# Patient Record
Sex: FEMALE | Race: WHITE | URBAN - METROPOLITAN AREA
[De-identification: names, ages, dates, MRNs, and addresses within clinical notes are randomized per-mention and may not be internally consistent; named-entity substitution may affect disease eponyms.]

---

## 2017-01-01 ENCOUNTER — APPOINTMENT (OUTPATIENT)
Dept: CT IMAGING | Facility: CLINIC | Age: 67
DRG: 057 | End: 2017-01-01
Attending: EMERGENCY MEDICINE
Payer: COMMERCIAL

## 2017-01-01 ENCOUNTER — APPOINTMENT (OUTPATIENT)
Dept: SPEECH THERAPY | Facility: CLINIC | Age: 67
DRG: 101 | End: 2017-01-01
Attending: INTERNAL MEDICINE
Payer: COMMERCIAL

## 2017-01-01 ENCOUNTER — NURSING HOME VISIT (OUTPATIENT)
Dept: GERIATRICS | Facility: CLINIC | Age: 67
End: 2017-01-01
Payer: COMMERCIAL

## 2017-01-01 ENCOUNTER — DOCUMENTATION ONLY (OUTPATIENT)
Dept: OTHER | Facility: CLINIC | Age: 67
End: 2017-01-01

## 2017-01-01 ENCOUNTER — NURSING HOME VISIT (OUTPATIENT)
Dept: FAMILY MEDICINE | Facility: CLINIC | Age: 67
End: 2017-01-01
Payer: COMMERCIAL

## 2017-01-01 ENCOUNTER — TELEPHONE (OUTPATIENT)
Dept: GERIATRICS | Facility: CLINIC | Age: 67
End: 2017-01-01

## 2017-01-01 ENCOUNTER — HOSPITAL ENCOUNTER (INPATIENT)
Facility: CLINIC | Age: 67
LOS: 3 days | Discharge: NURSING, REHAB OR LONG-TERM CARE | DRG: 057 | End: 2017-11-06
Attending: EMERGENCY MEDICINE | Admitting: INTERNAL MEDICINE
Payer: COMMERCIAL

## 2017-01-01 ENCOUNTER — APPOINTMENT (OUTPATIENT)
Dept: CT IMAGING | Facility: CLINIC | Age: 67
End: 2017-01-01
Attending: EMERGENCY MEDICINE
Payer: COMMERCIAL

## 2017-01-01 ENCOUNTER — HOSPITAL ENCOUNTER (INPATIENT)
Facility: CLINIC | Age: 67
LOS: 4 days | Discharge: NURSING, REHAB OR LONG-TERM CARE | DRG: 101 | End: 2017-10-03
Attending: EMERGENCY MEDICINE | Admitting: HOSPITALIST
Payer: COMMERCIAL

## 2017-01-01 ENCOUNTER — APPOINTMENT (OUTPATIENT)
Dept: CT IMAGING | Facility: CLINIC | Age: 67
DRG: 101 | End: 2017-01-01
Attending: EMERGENCY MEDICINE
Payer: COMMERCIAL

## 2017-01-01 ENCOUNTER — APPOINTMENT (OUTPATIENT)
Dept: CARDIOLOGY | Facility: CLINIC | Age: 67
DRG: 057 | End: 2017-01-01
Attending: PSYCHIATRY & NEUROLOGY
Payer: COMMERCIAL

## 2017-01-01 ENCOUNTER — CLINICAL UPDATE (OUTPATIENT)
Dept: PHARMACY | Facility: CLINIC | Age: 67
End: 2017-01-01

## 2017-01-01 ENCOUNTER — HOSPITAL ENCOUNTER (EMERGENCY)
Facility: CLINIC | Age: 67
Discharge: NURSING, REHAB OR LONG-TERM CARE | End: 2017-10-04
Attending: EMERGENCY MEDICINE | Admitting: EMERGENCY MEDICINE
Payer: COMMERCIAL

## 2017-01-01 ENCOUNTER — APPOINTMENT (OUTPATIENT)
Dept: MRI IMAGING | Facility: CLINIC | Age: 67
DRG: 057 | End: 2017-01-01
Attending: INTERNAL MEDICINE
Payer: COMMERCIAL

## 2017-01-01 ENCOUNTER — APPOINTMENT (OUTPATIENT)
Dept: GENERAL RADIOLOGY | Facility: CLINIC | Age: 67
DRG: 101 | End: 2017-01-01
Attending: EMERGENCY MEDICINE
Payer: COMMERCIAL

## 2017-01-01 ENCOUNTER — APPOINTMENT (OUTPATIENT)
Dept: SPEECH THERAPY | Facility: CLINIC | Age: 67
DRG: 057 | End: 2017-01-01
Attending: INTERNAL MEDICINE
Payer: COMMERCIAL

## 2017-01-01 ENCOUNTER — HOSPITAL LABORATORY (OUTPATIENT)
Dept: OTHER | Facility: CLINIC | Age: 67
End: 2017-01-01

## 2017-01-01 VITALS
SYSTOLIC BLOOD PRESSURE: 130 MMHG | TEMPERATURE: 97.2 F | WEIGHT: 248.6 LBS | HEART RATE: 64 BPM | RESPIRATION RATE: 18 BRPM | DIASTOLIC BLOOD PRESSURE: 68 MMHG | HEIGHT: 66 IN | BODY MASS INDEX: 39.95 KG/M2 | OXYGEN SATURATION: 95 %

## 2017-01-01 VITALS
DIASTOLIC BLOOD PRESSURE: 73 MMHG | SYSTOLIC BLOOD PRESSURE: 120 MMHG | HEART RATE: 47 BPM | OXYGEN SATURATION: 96 % | RESPIRATION RATE: 16 BRPM | TEMPERATURE: 98.5 F

## 2017-01-01 VITALS
HEART RATE: 62 BPM | BODY MASS INDEX: 39.89 KG/M2 | DIASTOLIC BLOOD PRESSURE: 68 MMHG | WEIGHT: 248.2 LBS | OXYGEN SATURATION: 95 % | RESPIRATION RATE: 16 BRPM | TEMPERATURE: 97.8 F | SYSTOLIC BLOOD PRESSURE: 114 MMHG | HEIGHT: 66 IN

## 2017-01-01 VITALS
DIASTOLIC BLOOD PRESSURE: 63 MMHG | HEART RATE: 64 BPM | HEIGHT: 69 IN | OXYGEN SATURATION: 93 % | RESPIRATION RATE: 14 BRPM | BODY MASS INDEX: 38.53 KG/M2 | SYSTOLIC BLOOD PRESSURE: 117 MMHG | TEMPERATURE: 97.8 F | WEIGHT: 260.14 LBS

## 2017-01-01 VITALS
TEMPERATURE: 98.4 F | SYSTOLIC BLOOD PRESSURE: 100 MMHG | WEIGHT: 251 LBS | HEART RATE: 81 BPM | OXYGEN SATURATION: 95 % | RESPIRATION RATE: 16 BRPM | DIASTOLIC BLOOD PRESSURE: 58 MMHG | BODY MASS INDEX: 40.51 KG/M2

## 2017-01-01 VITALS
SYSTOLIC BLOOD PRESSURE: 116 MMHG | HEART RATE: 54 BPM | WEIGHT: 236.4 LBS | RESPIRATION RATE: 16 BRPM | BODY MASS INDEX: 37.99 KG/M2 | TEMPERATURE: 98.2 F | HEIGHT: 66 IN | OXYGEN SATURATION: 90 % | DIASTOLIC BLOOD PRESSURE: 74 MMHG

## 2017-01-01 VITALS
TEMPERATURE: 97.4 F | WEIGHT: 220 LBS | BODY MASS INDEX: 37.56 KG/M2 | HEART RATE: 64 BPM | SYSTOLIC BLOOD PRESSURE: 124 MMHG | DIASTOLIC BLOOD PRESSURE: 76 MMHG | HEIGHT: 64 IN | RESPIRATION RATE: 18 BRPM | OXYGEN SATURATION: 98 %

## 2017-01-01 VITALS
RESPIRATION RATE: 16 BRPM | SYSTOLIC BLOOD PRESSURE: 121 MMHG | HEART RATE: 49 BPM | OXYGEN SATURATION: 95 % | BODY MASS INDEX: 40.64 KG/M2 | TEMPERATURE: 97.6 F | HEIGHT: 66 IN | DIASTOLIC BLOOD PRESSURE: 72 MMHG | WEIGHT: 252.9 LBS

## 2017-01-01 VITALS
OXYGEN SATURATION: 100 % | RESPIRATION RATE: 16 BRPM | WEIGHT: 252.3 LBS | HEART RATE: 55 BPM | TEMPERATURE: 98 F | HEIGHT: 66 IN | DIASTOLIC BLOOD PRESSURE: 71 MMHG | BODY MASS INDEX: 40.55 KG/M2 | SYSTOLIC BLOOD PRESSURE: 105 MMHG

## 2017-01-01 VITALS
BODY MASS INDEX: 39.89 KG/M2 | TEMPERATURE: 98 F | HEART RATE: 54 BPM | RESPIRATION RATE: 19 BRPM | SYSTOLIC BLOOD PRESSURE: 124 MMHG | WEIGHT: 248.2 LBS | OXYGEN SATURATION: 96 % | HEIGHT: 66 IN | DIASTOLIC BLOOD PRESSURE: 80 MMHG

## 2017-01-01 VITALS
TEMPERATURE: 98.4 F | RESPIRATION RATE: 16 BRPM | BODY MASS INDEX: 35.52 KG/M2 | HEART RATE: 57 BPM | OXYGEN SATURATION: 92 % | HEIGHT: 66 IN | WEIGHT: 221 LBS | SYSTOLIC BLOOD PRESSURE: 122 MMHG | DIASTOLIC BLOOD PRESSURE: 76 MMHG

## 2017-01-01 VITALS
HEART RATE: 54 BPM | RESPIRATION RATE: 16 BRPM | BODY MASS INDEX: 37.99 KG/M2 | DIASTOLIC BLOOD PRESSURE: 74 MMHG | OXYGEN SATURATION: 90 % | WEIGHT: 236.4 LBS | SYSTOLIC BLOOD PRESSURE: 116 MMHG | HEIGHT: 66 IN | TEMPERATURE: 98.2 F

## 2017-01-01 VITALS
RESPIRATION RATE: 18 BRPM | DIASTOLIC BLOOD PRESSURE: 78 MMHG | HEIGHT: 66 IN | WEIGHT: 249 LBS | TEMPERATURE: 97.6 F | HEART RATE: 82 BPM | SYSTOLIC BLOOD PRESSURE: 125 MMHG | BODY MASS INDEX: 40.02 KG/M2 | OXYGEN SATURATION: 95 %

## 2017-01-01 VITALS
HEART RATE: 55 BPM | DIASTOLIC BLOOD PRESSURE: 60 MMHG | HEIGHT: 66 IN | RESPIRATION RATE: 16 BRPM | OXYGEN SATURATION: 97 % | WEIGHT: 251.7 LBS | BODY MASS INDEX: 40.45 KG/M2 | TEMPERATURE: 97.6 F | SYSTOLIC BLOOD PRESSURE: 99 MMHG

## 2017-01-01 VITALS
HEART RATE: 60 BPM | DIASTOLIC BLOOD PRESSURE: 80 MMHG | TEMPERATURE: 97 F | SYSTOLIC BLOOD PRESSURE: 122 MMHG | RESPIRATION RATE: 16 BRPM | HEIGHT: 66 IN | BODY MASS INDEX: 38.47 KG/M2 | WEIGHT: 239.4 LBS | OXYGEN SATURATION: 93 %

## 2017-01-01 VITALS
HEIGHT: 66 IN | DIASTOLIC BLOOD PRESSURE: 80 MMHG | SYSTOLIC BLOOD PRESSURE: 124 MMHG | TEMPERATURE: 98 F | HEART RATE: 54 BPM | BODY MASS INDEX: 39.89 KG/M2 | WEIGHT: 248.2 LBS | OXYGEN SATURATION: 96 % | RESPIRATION RATE: 19 BRPM

## 2017-01-01 VITALS
OXYGEN SATURATION: 96 % | DIASTOLIC BLOOD PRESSURE: 80 MMHG | HEART RATE: 60 BPM | TEMPERATURE: 96.8 F | BODY MASS INDEX: 40.37 KG/M2 | HEIGHT: 66 IN | SYSTOLIC BLOOD PRESSURE: 124 MMHG | RESPIRATION RATE: 16 BRPM | WEIGHT: 251.2 LBS

## 2017-01-01 VITALS
WEIGHT: 239.8 LBS | SYSTOLIC BLOOD PRESSURE: 120 MMHG | TEMPERATURE: 97 F | DIASTOLIC BLOOD PRESSURE: 76 MMHG | OXYGEN SATURATION: 94 % | HEIGHT: 66 IN | HEART RATE: 64 BPM | RESPIRATION RATE: 16 BRPM | BODY MASS INDEX: 38.54 KG/M2

## 2017-01-01 VITALS
SYSTOLIC BLOOD PRESSURE: 116 MMHG | RESPIRATION RATE: 16 BRPM | BODY MASS INDEX: 37.99 KG/M2 | OXYGEN SATURATION: 90 % | DIASTOLIC BLOOD PRESSURE: 74 MMHG | WEIGHT: 236.4 LBS | HEIGHT: 66 IN | TEMPERATURE: 98.2 F | HEART RATE: 54 BPM

## 2017-01-01 VITALS
RESPIRATION RATE: 16 BRPM | DIASTOLIC BLOOD PRESSURE: 72 MMHG | HEIGHT: 66 IN | OXYGEN SATURATION: 92 % | SYSTOLIC BLOOD PRESSURE: 124 MMHG | BODY MASS INDEX: 40.27 KG/M2 | HEART RATE: 56 BPM | TEMPERATURE: 98.4 F | WEIGHT: 250.6 LBS

## 2017-01-01 DIAGNOSIS — E66.01 MORBID OBESITY, UNSPECIFIED OBESITY TYPE (H): ICD-10-CM

## 2017-01-01 DIAGNOSIS — B35.1 ONYCHOMYCOSIS: ICD-10-CM

## 2017-01-01 DIAGNOSIS — G30.0 DEMENTIA IN ALZHEIMER'S DISEASE WITH EARLY ONSET (H): ICD-10-CM

## 2017-01-01 DIAGNOSIS — I10 HYPERTENSION GOAL BP (BLOOD PRESSURE) < 130/80: ICD-10-CM

## 2017-01-01 DIAGNOSIS — M62.81 GENERALIZED MUSCLE WEAKNESS: Primary | ICD-10-CM

## 2017-01-01 DIAGNOSIS — G30.0 EARLY ONSET ALZHEIMER'S DISEASE WITH BEHAVIORAL DISTURBANCE (H): Primary | ICD-10-CM

## 2017-01-01 DIAGNOSIS — G40.909 SEIZURE DISORDER (H): ICD-10-CM

## 2017-01-01 DIAGNOSIS — I69.990 APRAXIA, LATE EFFECT OF CARDIOVASCULAR DISEASE: ICD-10-CM

## 2017-01-01 DIAGNOSIS — F02.818 EARLY ONSET ALZHEIMER'S DISEASE WITH BEHAVIORAL DISTURBANCE (H): ICD-10-CM

## 2017-01-01 DIAGNOSIS — R00.1 BRADYCARDIA: ICD-10-CM

## 2017-01-01 DIAGNOSIS — R56.9 SEIZURE (H): ICD-10-CM

## 2017-01-01 DIAGNOSIS — R47.89 WORD FINDING DIFFICULTY: ICD-10-CM

## 2017-01-01 DIAGNOSIS — F33.1 MODERATE EPISODE OF RECURRENT MAJOR DEPRESSIVE DISORDER (H): ICD-10-CM

## 2017-01-01 DIAGNOSIS — G30.0 EARLY ONSET ALZHEIMER'S DISEASE WITH BEHAVIORAL DISTURBANCE (H): ICD-10-CM

## 2017-01-01 DIAGNOSIS — F02.818 EARLY ONSET ALZHEIMER'S DISEASE WITH BEHAVIORAL DISTURBANCE (H): Primary | ICD-10-CM

## 2017-01-01 DIAGNOSIS — H61.23 BILATERAL IMPACTED CERUMEN: ICD-10-CM

## 2017-01-01 DIAGNOSIS — Z71.89 ADVANCED DIRECTIVES, COUNSELING/DISCUSSION: Chronic | ICD-10-CM

## 2017-01-01 DIAGNOSIS — E55.9 VITAMIN D DEFICIENCY: ICD-10-CM

## 2017-01-01 DIAGNOSIS — N39.0 URINARY TRACT INFECTION WITHOUT HEMATURIA, SITE UNSPECIFIED: ICD-10-CM

## 2017-01-01 DIAGNOSIS — Z98.84 BARIATRIC SURGERY STATUS: ICD-10-CM

## 2017-01-01 DIAGNOSIS — R53.1 WEAKNESS OF LEFT SIDE OF BODY: ICD-10-CM

## 2017-01-01 DIAGNOSIS — R13.10 DYSPHAGIA, UNSPECIFIED TYPE: ICD-10-CM

## 2017-01-01 DIAGNOSIS — E55.9 VITAMIN D INSUFFICIENCY: ICD-10-CM

## 2017-01-01 DIAGNOSIS — Z86.39 HISTORY OF DIABETES MELLITUS, TYPE II: ICD-10-CM

## 2017-01-01 DIAGNOSIS — E66.09 OBESITY DUE TO EXCESS CALORIES, UNSPECIFIED OBESITY SEVERITY: ICD-10-CM

## 2017-01-01 DIAGNOSIS — I10 HYPERTENSION GOAL BP (BLOOD PRESSURE) < 130/80: Primary | ICD-10-CM

## 2017-01-01 DIAGNOSIS — R56.9 SEIZURE (H): Primary | ICD-10-CM

## 2017-01-01 DIAGNOSIS — Z71.89 ADVANCED DIRECTIVES, COUNSELING/DISCUSSION: ICD-10-CM

## 2017-01-01 DIAGNOSIS — R47.01: ICD-10-CM

## 2017-01-01 DIAGNOSIS — Z80.41 FAMILY HISTORY OF MALIGNANT NEOPLASM OF OVARY: ICD-10-CM

## 2017-01-01 DIAGNOSIS — R48.2 APRAXIA: ICD-10-CM

## 2017-01-01 DIAGNOSIS — G40.409 GRAND MAL SEIZURE (H): ICD-10-CM

## 2017-01-01 DIAGNOSIS — R63.4 WEIGHT LOSS: ICD-10-CM

## 2017-01-01 DIAGNOSIS — R41.82 ALTERED MENTAL STATUS, UNSPECIFIED ALTERED MENTAL STATUS TYPE: ICD-10-CM

## 2017-01-01 DIAGNOSIS — R13.10 DYSPHAGIA, UNSPECIFIED TYPE: Primary | ICD-10-CM

## 2017-01-01 DIAGNOSIS — R63.4 LOSS OF WEIGHT: Primary | ICD-10-CM

## 2017-01-01 DIAGNOSIS — R30.0 DYSURIA: Primary | ICD-10-CM

## 2017-01-01 DIAGNOSIS — F02.80 DEMENTIA IN ALZHEIMER'S DISEASE WITH EARLY ONSET (H): ICD-10-CM

## 2017-01-01 DIAGNOSIS — Z51.5 COMFORT MEASURES ONLY STATUS: ICD-10-CM

## 2017-01-01 DIAGNOSIS — I63.40 CEREBRAL INFARCTION DUE TO EMBOLISM OF CEREBRAL ARTERY (H): ICD-10-CM

## 2017-01-01 DIAGNOSIS — R47.01 APHASIA: ICD-10-CM

## 2017-01-01 LAB
ALBUMIN SERPL-MCNC: 2.5 G/DL (ref 3.4–5)
ALBUMIN SERPL-MCNC: 2.8 G/DL (ref 3.4–5)
ALBUMIN UR-MCNC: 100 MG/DL
ALBUMIN UR-MCNC: NEGATIVE MG/DL
ALP SERPL-CCNC: 69 U/L (ref 40–150)
ALP SERPL-CCNC: 86 U/L (ref 40–150)
ALT SERPL W P-5'-P-CCNC: 24 U/L (ref 0–50)
ALT SERPL W P-5'-P-CCNC: 48 U/L (ref 0–50)
ANION GAP SERPL CALCULATED.3IONS-SCNC: 4 MMOL/L (ref 3–14)
ANION GAP SERPL CALCULATED.3IONS-SCNC: 7 MMOL/L (ref 3–14)
ANION GAP SERPL CALCULATED.3IONS-SCNC: 9 MMOL/L (ref 3–14)
ANION GAP SERPL CALCULATED.3IONS-SCNC: 9 MMOL/L (ref 3–14)
ANION GAP SERPL CALCULATED.3IONS-SCNC: <1 MMOL/L (ref 3–14)
APPEARANCE UR: ABNORMAL
APPEARANCE UR: ABNORMAL
APTT PPP: 35 SEC (ref 22–37)
AST SERPL W P-5'-P-CCNC: 13 U/L (ref 0–45)
AST SERPL W P-5'-P-CCNC: 41 U/L (ref 0–45)
BACTERIA #/AREA URNS HPF: ABNORMAL /HPF
BACTERIA #/AREA URNS HPF: ABNORMAL /HPF
BACTERIA SPEC CULT: ABNORMAL
BASE DEFICIT BLDV-SCNC: 7.1 MMOL/L
BASOPHILS # BLD AUTO: 0 10E9/L (ref 0–0.2)
BASOPHILS # BLD AUTO: 0 10E9/L (ref 0–0.2)
BASOPHILS # BLD AUTO: 0.1 10E9/L (ref 0–0.2)
BASOPHILS NFR BLD AUTO: 0.3 %
BASOPHILS NFR BLD AUTO: 0.3 %
BASOPHILS NFR BLD AUTO: 0.6 %
BILIRUB SERPL-MCNC: 0.4 MG/DL (ref 0.2–1.3)
BILIRUB SERPL-MCNC: 0.8 MG/DL (ref 0.2–1.3)
BILIRUB UR QL STRIP: NEGATIVE
BILIRUB UR QL STRIP: NEGATIVE
BUN SERPL-MCNC: 14 MG/DL (ref 7–30)
BUN SERPL-MCNC: 15 MG/DL (ref 7–30)
BUN SERPL-MCNC: 18 MG/DL (ref 7–30)
BUN SERPL-MCNC: 19 MG/DL (ref 7–30)
BUN SERPL-MCNC: 27 MG/DL (ref 7–30)
CALCIUM SERPL-MCNC: 8 MG/DL (ref 8.5–10.1)
CALCIUM SERPL-MCNC: 8.4 MG/DL (ref 8.5–10.1)
CALCIUM SERPL-MCNC: 8.8 MG/DL (ref 8.5–10.1)
CALCIUM SERPL-MCNC: 9.2 MG/DL (ref 8.5–10.1)
CALCIUM SERPL-MCNC: 9.5 MG/DL (ref 8.5–10.1)
CANCER AG125 SERPL-ACNC: 14 U/ML (ref 0–30)
CHLORIDE SERPL-SCNC: 107 MMOL/L (ref 94–109)
CHLORIDE SERPL-SCNC: 108 MMOL/L (ref 94–109)
CHLORIDE SERPL-SCNC: 109 MMOL/L (ref 94–109)
CHLORIDE SERPL-SCNC: 109 MMOL/L (ref 94–109)
CHLORIDE SERPL-SCNC: 111 MMOL/L (ref 94–109)
CHOLEST SERPL-MCNC: 206 MG/DL
CK SERPL-CCNC: 123 U/L (ref 30–225)
CO2 BLDCOV-SCNC: 30 MMOL/L (ref 21–28)
CO2 SERPL-SCNC: 24 MMOL/L (ref 20–32)
CO2 SERPL-SCNC: 25 MMOL/L (ref 20–32)
CO2 SERPL-SCNC: 29 MMOL/L (ref 20–32)
CO2 SERPL-SCNC: 30 MMOL/L (ref 20–32)
CO2 SERPL-SCNC: 30 MMOL/L (ref 20–32)
COLOR UR AUTO: ABNORMAL
COLOR UR AUTO: YELLOW
CREAT BLD-MCNC: 0.9 MG/DL (ref 0.52–1.04)
CREAT SERPL-MCNC: 0.72 MG/DL (ref 0.52–1.04)
CREAT SERPL-MCNC: 0.78 MG/DL (ref 0.52–1.04)
CREAT SERPL-MCNC: 0.8 MG/DL (ref 0.52–1.04)
CREAT SERPL-MCNC: 0.85 MG/DL (ref 0.52–1.04)
CREAT SERPL-MCNC: 0.9 MG/DL (ref 0.52–1.04)
CRP SERPL-MCNC: 3.4 MG/L (ref 0–8)
DEPRECATED CALCIDIOL+CALCIFEROL SERPL-MC: 24 UG/L (ref 20–75)
DEPRECATED CALCIDIOL+CALCIFEROL SERPL-MC: 32 UG/L (ref 20–75)
DIFFERENTIAL METHOD BLD: NORMAL
EOSINOPHIL # BLD AUTO: 0.4 10E9/L (ref 0–0.7)
EOSINOPHIL # BLD AUTO: 0.4 10E9/L (ref 0–0.7)
EOSINOPHIL # BLD AUTO: 0.5 10E9/L (ref 0–0.7)
EOSINOPHIL NFR BLD AUTO: 3.5 %
EOSINOPHIL NFR BLD AUTO: 4.9 %
EOSINOPHIL NFR BLD AUTO: 5.5 %
ERYTHROCYTE [DISTWIDTH] IN BLOOD BY AUTOMATED COUNT: 13.4 % (ref 10–15)
ERYTHROCYTE [DISTWIDTH] IN BLOOD BY AUTOMATED COUNT: 13.6 % (ref 10–15)
ERYTHROCYTE [DISTWIDTH] IN BLOOD BY AUTOMATED COUNT: 13.6 % (ref 10–15)
ERYTHROCYTE [DISTWIDTH] IN BLOOD BY AUTOMATED COUNT: 13.9 % (ref 10–15)
GFR SERPL CREATININE-BSD FRML MDRD: 62 ML/MIN/1.7M2
GFR SERPL CREATININE-BSD FRML MDRD: 62 ML/MIN/1.7M2
GFR SERPL CREATININE-BSD FRML MDRD: 66 ML/MIN/1.7M2
GFR SERPL CREATININE-BSD FRML MDRD: 71 ML/MIN/1.7M2
GFR SERPL CREATININE-BSD FRML MDRD: 73 ML/MIN/1.7M2
GFR SERPL CREATININE-BSD FRML MDRD: 81 ML/MIN/1.7M2
GLUCOSE BLDC GLUCOMTR-MCNC: 100 MG/DL (ref 70–99)
GLUCOSE BLDC GLUCOMTR-MCNC: 102 MG/DL (ref 70–99)
GLUCOSE BLDC GLUCOMTR-MCNC: 114 MG/DL (ref 70–99)
GLUCOSE BLDC GLUCOMTR-MCNC: 116 MG/DL (ref 70–99)
GLUCOSE BLDC GLUCOMTR-MCNC: 119 MG/DL (ref 70–99)
GLUCOSE BLDC GLUCOMTR-MCNC: 130 MG/DL (ref 70–99)
GLUCOSE BLDC GLUCOMTR-MCNC: 146 MG/DL (ref 70–99)
GLUCOSE BLDC GLUCOMTR-MCNC: 155 MG/DL (ref 70–99)
GLUCOSE BLDC GLUCOMTR-MCNC: 61 MG/DL (ref 70–99)
GLUCOSE BLDC GLUCOMTR-MCNC: 71 MG/DL (ref 70–99)
GLUCOSE BLDC GLUCOMTR-MCNC: 74 MG/DL (ref 70–99)
GLUCOSE BLDC GLUCOMTR-MCNC: 75 MG/DL (ref 70–99)
GLUCOSE BLDC GLUCOMTR-MCNC: 77 MG/DL (ref 70–99)
GLUCOSE BLDC GLUCOMTR-MCNC: 78 MG/DL (ref 70–99)
GLUCOSE BLDC GLUCOMTR-MCNC: 78 MG/DL (ref 70–99)
GLUCOSE BLDC GLUCOMTR-MCNC: 79 MG/DL (ref 70–99)
GLUCOSE BLDC GLUCOMTR-MCNC: 79 MG/DL (ref 70–99)
GLUCOSE BLDC GLUCOMTR-MCNC: 80 MG/DL (ref 70–99)
GLUCOSE BLDC GLUCOMTR-MCNC: 81 MG/DL (ref 70–99)
GLUCOSE BLDC GLUCOMTR-MCNC: 81 MG/DL (ref 70–99)
GLUCOSE BLDC GLUCOMTR-MCNC: 82 MG/DL (ref 70–99)
GLUCOSE BLDC GLUCOMTR-MCNC: 82 MG/DL (ref 70–99)
GLUCOSE BLDC GLUCOMTR-MCNC: 88 MG/DL (ref 70–99)
GLUCOSE BLDC GLUCOMTR-MCNC: 88 MG/DL (ref 70–99)
GLUCOSE BLDC GLUCOMTR-MCNC: 92 MG/DL (ref 70–99)
GLUCOSE BLDC GLUCOMTR-MCNC: 93 MG/DL (ref 70–99)
GLUCOSE BLDC GLUCOMTR-MCNC: 98 MG/DL (ref 70–99)
GLUCOSE SERPL-MCNC: 109 MG/DL (ref 70–99)
GLUCOSE SERPL-MCNC: 123 MG/DL (ref 70–99)
GLUCOSE SERPL-MCNC: 77 MG/DL (ref 70–99)
GLUCOSE SERPL-MCNC: 82 MG/DL (ref 70–99)
GLUCOSE SERPL-MCNC: 88 MG/DL (ref 70–99)
GLUCOSE UR STRIP-MCNC: NEGATIVE MG/DL
GLUCOSE UR STRIP-MCNC: NEGATIVE MG/DL
HBA1C MFR BLD: 5.5 % (ref 4.3–6)
HBA1C MFR BLD: 5.6 % (ref 4.3–6)
HCO3 BLDV-SCNC: 22 MMOL/L (ref 21–28)
HCT VFR BLD AUTO: 40.7 % (ref 35–47)
HCT VFR BLD AUTO: 42.7 % (ref 35–47)
HCT VFR BLD AUTO: 45.6 % (ref 35–47)
HCT VFR BLD AUTO: 46.1 % (ref 35–47)
HDLC SERPL-MCNC: 49 MG/DL
HGB BLD-MCNC: 13 G/DL (ref 11.7–15.7)
HGB BLD-MCNC: 14 G/DL (ref 11.7–15.7)
HGB BLD-MCNC: 14.5 G/DL (ref 11.7–15.7)
HGB BLD-MCNC: 15.2 G/DL (ref 11.7–15.7)
HGB UR QL STRIP: NEGATIVE
HGB UR QL STRIP: NEGATIVE
IMM GRANULOCYTES # BLD: 0 10E9/L (ref 0–0.4)
IMM GRANULOCYTES NFR BLD: 0.2 %
IMM GRANULOCYTES NFR BLD: 0.3 %
IMM GRANULOCYTES NFR BLD: 0.4 %
INR PPP: 0.99 (ref 0.86–1.14)
INTERPRETATION ECG - MUSE: NORMAL
INTERPRETATION ECG - MUSE: NORMAL
KETONES UR STRIP-MCNC: NEGATIVE MG/DL
KETONES UR STRIP-MCNC: NEGATIVE MG/DL
LACTATE BLD-SCNC: 1.5 MMOL/L (ref 0.7–2)
LACTATE BLD-SCNC: 2 MMOL/L (ref 0.7–2.1)
LDLC SERPL CALC-MCNC: 140 MG/DL
LEUKOCYTE ESTERASE UR QL STRIP: ABNORMAL
LEUKOCYTE ESTERASE UR QL STRIP: NEGATIVE
LEVETIRACETAM SERPL-MCNC: 20 UG/ML (ref 12–46)
LEVETIRACETAM SERPL-MCNC: 25 UG/ML (ref 12–46)
LYMPHOCYTES # BLD AUTO: 3.1 10E9/L (ref 0.8–5.3)
LYMPHOCYTES # BLD AUTO: 4.7 10E9/L (ref 0.8–5.3)
LYMPHOCYTES # BLD AUTO: 5.2 10E9/L (ref 0.8–5.3)
LYMPHOCYTES NFR BLD AUTO: 40.1 %
LYMPHOCYTES NFR BLD AUTO: 48.7 %
LYMPHOCYTES NFR BLD AUTO: 53 %
Lab: ABNORMAL
MAGNESIUM SERPL-MCNC: 2.3 MG/DL (ref 1.6–2.3)
MCH RBC QN AUTO: 31.5 PG (ref 26.5–33)
MCH RBC QN AUTO: 31.6 PG (ref 26.5–33)
MCH RBC QN AUTO: 32.2 PG (ref 26.5–33)
MCH RBC QN AUTO: 32.3 PG (ref 26.5–33)
MCHC RBC AUTO-ENTMCNC: 31.8 G/DL (ref 31.5–36.5)
MCHC RBC AUTO-ENTMCNC: 31.9 G/DL (ref 31.5–36.5)
MCHC RBC AUTO-ENTMCNC: 32.8 G/DL (ref 31.5–36.5)
MCHC RBC AUTO-ENTMCNC: 33 G/DL (ref 31.5–36.5)
MCV RBC AUTO: 98 FL (ref 78–100)
MCV RBC AUTO: 98 FL (ref 78–100)
MCV RBC AUTO: 99 FL (ref 78–100)
MCV RBC AUTO: 99 FL (ref 78–100)
MONOCYTES # BLD AUTO: 0.6 10E9/L (ref 0–1.3)
MONOCYTES # BLD AUTO: 0.7 10E9/L (ref 0–1.3)
MONOCYTES # BLD AUTO: 0.7 10E9/L (ref 0–1.3)
MONOCYTES NFR BLD AUTO: 6.6 %
MONOCYTES NFR BLD AUTO: 7 %
MONOCYTES NFR BLD AUTO: 9.3 %
MUCOUS THREADS #/AREA URNS LPF: PRESENT /LPF
MUCOUS THREADS #/AREA URNS LPF: PRESENT /LPF
NEUTROPHILS # BLD AUTO: 3 10E9/L (ref 1.6–8.3)
NEUTROPHILS # BLD AUTO: 3.5 10E9/L (ref 1.6–8.3)
NEUTROPHILS # BLD AUTO: 4.3 10E9/L (ref 1.6–8.3)
NEUTROPHILS NFR BLD AUTO: 33.7 %
NEUTROPHILS NFR BLD AUTO: 40.5 %
NEUTROPHILS NFR BLD AUTO: 45.1 %
NITRATE UR QL: NEGATIVE
NITRATE UR QL: NEGATIVE
NONHDLC SERPL-MCNC: 157 MG/DL
NRBC # BLD AUTO: 0 10*3/UL
NRBC BLD AUTO-RTO: 0 /100
O2/TOTAL GAS SETTING VFR VENT: 2 %
PARANEOPLASTIC AB SER QL IF: NORMAL
PCO2 BLDV: 53 MM HG (ref 40–50)
PCO2 BLDV: 56 MM HG (ref 40–50)
PH BLDV: 7.2 PH (ref 7.32–7.43)
PH BLDV: 7.36 PH (ref 7.32–7.43)
PH UR STRIP: 5 PH (ref 5–7)
PH UR STRIP: 6 PH (ref 5–7)
PLATELET # BLD AUTO: 151 10E9/L (ref 150–450)
PLATELET # BLD AUTO: 161 10E9/L (ref 150–450)
PLATELET # BLD AUTO: 170 10E9/L (ref 150–450)
PLATELET # BLD AUTO: 179 10E9/L (ref 150–450)
PLATELET # BLD EST: NORMAL 10*3/UL
PO2 BLDV: 32 MM HG (ref 25–47)
PO2 BLDV: 39 MM HG (ref 25–47)
POTASSIUM SERPL-SCNC: 3.8 MMOL/L (ref 3.4–5.3)
POTASSIUM SERPL-SCNC: 3.8 MMOL/L (ref 3.4–5.3)
POTASSIUM SERPL-SCNC: 4 MMOL/L (ref 3.4–5.3)
POTASSIUM SERPL-SCNC: 4.5 MMOL/L (ref 3.4–5.3)
POTASSIUM SERPL-SCNC: 5 MMOL/L (ref 3.4–5.3)
PROT SERPL-MCNC: 6 G/DL (ref 6.8–8.8)
PROT SERPL-MCNC: 6.2 G/DL (ref 6.8–8.8)
RBC # BLD AUTO: 4.12 10E12/L (ref 3.8–5.2)
RBC # BLD AUTO: 4.35 10E12/L (ref 3.8–5.2)
RBC # BLD AUTO: 4.6 10E12/L (ref 3.8–5.2)
RBC # BLD AUTO: 4.71 10E12/L (ref 3.8–5.2)
RBC #/AREA URNS AUTO: 11 /HPF (ref 0–2)
RBC #/AREA URNS AUTO: 2 /HPF (ref 0–2)
RBC MORPH BLD: NORMAL
SAO2 % BLDV FROM PO2: 57 %
SODIUM SERPL-SCNC: 141 MMOL/L (ref 133–144)
SODIUM SERPL-SCNC: 141 MMOL/L (ref 133–144)
SODIUM SERPL-SCNC: 142 MMOL/L (ref 133–144)
SODIUM SERPL-SCNC: 143 MMOL/L (ref 133–144)
SODIUM SERPL-SCNC: 144 MMOL/L (ref 133–144)
SOURCE: ABNORMAL
SOURCE: ABNORMAL
SP GR UR STRIP: 1.01 (ref 1–1.03)
SP GR UR STRIP: 1.02 (ref 1–1.03)
SPECIMEN SOURCE: ABNORMAL
SQUAMOUS #/AREA URNS AUTO: 1 /HPF (ref 0–1)
SQUAMOUS #/AREA URNS AUTO: <1 /HPF (ref 0–1)
TRIGL SERPL-MCNC: 83 MG/DL
TROPONIN I BLD-MCNC: 0 UG/L (ref 0–0.1)
TROPONIN I SERPL-MCNC: <0.015 UG/L (ref 0–0.04)
TROPONIN I SERPL-MCNC: <0.015 UG/L (ref 0–0.04)
TSH SERPL DL<=0.005 MIU/L-ACNC: 2.49 MU/L (ref 0.4–4)
TSH SERPL DL<=0.005 MIU/L-ACNC: 3.2 MU/L (ref 0.4–4)
TSH SERPL DL<=0.005 MIU/L-ACNC: 3.89 MU/L (ref 0.4–4)
UROBILINOGEN UR STRIP-MCNC: 4 MG/DL (ref 0–2)
UROBILINOGEN UR STRIP-MCNC: 4 MG/DL (ref 0–2)
VARIANT LYMPHS BLD QL SMEAR: PRESENT
VIT B12 SERPL-MCNC: 595 PG/ML (ref 193–986)
WBC # BLD AUTO: 10.7 10E9/L (ref 4–11)
WBC # BLD AUTO: 7.7 10E9/L (ref 4–11)
WBC # BLD AUTO: 7.8 10E9/L (ref 4–11)
WBC # BLD AUTO: 8.9 10E9/L (ref 4–11)
WBC #/AREA URNS AUTO: 2 /HPF (ref 0–2)
WBC #/AREA URNS AUTO: 31 /HPF (ref 0–2)

## 2017-01-01 PROCEDURE — 90662 IIV NO PRSV INCREASED AG IM: CPT | Performed by: INTERNAL MEDICINE

## 2017-01-01 PROCEDURE — 25000132 ZZH RX MED GY IP 250 OP 250 PS 637: Performed by: HOSPITALIST

## 2017-01-01 PROCEDURE — 99308 SBSQ NF CARE LOW MDM 20: CPT | Performed by: NURSE PRACTITIONER

## 2017-01-01 PROCEDURE — 82803 BLOOD GASES ANY COMBINATION: CPT | Performed by: EMERGENCY MEDICINE

## 2017-01-01 PROCEDURE — 99309 SBSQ NF CARE MODERATE MDM 30: CPT | Performed by: FAMILY MEDICINE

## 2017-01-01 PROCEDURE — 99285 EMERGENCY DEPT VISIT HI MDM: CPT | Mod: 25

## 2017-01-01 PROCEDURE — 25000132 ZZH RX MED GY IP 250 OP 250 PS 637: Performed by: EMERGENCY MEDICINE

## 2017-01-01 PROCEDURE — 25000132 ZZH RX MED GY IP 250 OP 250 PS 637: Performed by: INTERNAL MEDICINE

## 2017-01-01 PROCEDURE — 12000000 ZZH R&B MED SURG/OB

## 2017-01-01 PROCEDURE — 84484 ASSAY OF TROPONIN QUANT: CPT

## 2017-01-01 PROCEDURE — 99232 SBSQ HOSP IP/OBS MODERATE 35: CPT | Performed by: INTERNAL MEDICINE

## 2017-01-01 PROCEDURE — 99306 1ST NF CARE HIGH MDM 50: CPT | Performed by: FAMILY MEDICINE

## 2017-01-01 PROCEDURE — 70470 CT HEAD/BRAIN W/O & W/DYE: CPT | Mod: XS

## 2017-01-01 PROCEDURE — 93306 TTE W/DOPPLER COMPLETE: CPT | Mod: 26 | Performed by: INTERNAL MEDICINE

## 2017-01-01 PROCEDURE — 25000128 H RX IP 250 OP 636: Performed by: INTERNAL MEDICINE

## 2017-01-01 PROCEDURE — 25000132 ZZH RX MED GY IP 250 OP 250 PS 637: Performed by: PSYCHIATRY & NEUROLOGY

## 2017-01-01 PROCEDURE — 25000128 H RX IP 250 OP 636: Performed by: PSYCHIATRY & NEUROLOGY

## 2017-01-01 PROCEDURE — 99309 SBSQ NF CARE MODERATE MDM 30: CPT | Performed by: NURSE PRACTITIONER

## 2017-01-01 PROCEDURE — 25000128 H RX IP 250 OP 636: Performed by: EMERGENCY MEDICINE

## 2017-01-01 PROCEDURE — 99239 HOSP IP/OBS DSCHRG MGMT >30: CPT | Performed by: INTERNAL MEDICINE

## 2017-01-01 PROCEDURE — 99233 SBSQ HOSP IP/OBS HIGH 50: CPT | Performed by: INTERNAL MEDICINE

## 2017-01-01 PROCEDURE — 00000146 ZZHCL STATISTIC GLUCOSE BY METER IP

## 2017-01-01 PROCEDURE — 99212 OFFICE O/P EST SF 10 MIN: CPT

## 2017-01-01 PROCEDURE — 99207 ZZC APP CREDIT; MD BILLING SHARED VISIT: CPT | Performed by: INTERNAL MEDICINE

## 2017-01-01 PROCEDURE — G0378 HOSPITAL OBSERVATION PER HR: HCPCS

## 2017-01-01 PROCEDURE — 25000132 ZZH RX MED GY IP 250 OP 250 PS 637

## 2017-01-01 PROCEDURE — 99307 SBSQ NF CARE SF MDM 10: CPT | Performed by: NURSE PRACTITIONER

## 2017-01-01 PROCEDURE — 83735 ASSAY OF MAGNESIUM: CPT | Performed by: INTERNAL MEDICINE

## 2017-01-01 PROCEDURE — 70551 MRI BRAIN STEM W/O DYE: CPT

## 2017-01-01 PROCEDURE — 85025 COMPLETE CBC W/AUTO DIFF WBC: CPT | Performed by: EMERGENCY MEDICINE

## 2017-01-01 PROCEDURE — 85730 THROMBOPLASTIN TIME PARTIAL: CPT | Performed by: EMERGENCY MEDICINE

## 2017-01-01 PROCEDURE — 93005 ELECTROCARDIOGRAM TRACING: CPT

## 2017-01-01 PROCEDURE — 96361 HYDRATE IV INFUSION ADD-ON: CPT

## 2017-01-01 PROCEDURE — 86140 C-REACTIVE PROTEIN: CPT | Performed by: INTERNAL MEDICINE

## 2017-01-01 PROCEDURE — 99310 SBSQ NF CARE HIGH MDM 45: CPT | Performed by: NURSE PRACTITIONER

## 2017-01-01 PROCEDURE — 80177 DRUG SCRN QUAN LEVETIRACETAM: CPT | Performed by: EMERGENCY MEDICINE

## 2017-01-01 PROCEDURE — 40000225 ZZH STATISTIC SLP WARD VISIT

## 2017-01-01 PROCEDURE — 84484 ASSAY OF TROPONIN QUANT: CPT | Performed by: INTERNAL MEDICINE

## 2017-01-01 PROCEDURE — 36415 COLL VENOUS BLD VENIPUNCTURE: CPT | Performed by: INTERNAL MEDICINE

## 2017-01-01 PROCEDURE — 80053 COMPREHEN METABOLIC PANEL: CPT | Performed by: EMERGENCY MEDICINE

## 2017-01-01 PROCEDURE — 86255 FLUORESCENT ANTIBODY SCREEN: CPT | Performed by: EMERGENCY MEDICINE

## 2017-01-01 PROCEDURE — 81001 URINALYSIS AUTO W/SCOPE: CPT | Performed by: INTERNAL MEDICINE

## 2017-01-01 PROCEDURE — 99220 ZZC INITIAL OBSERVATION CARE,LEVL III: CPT | Performed by: HOSPITALIST

## 2017-01-01 PROCEDURE — 70450 CT HEAD/BRAIN W/O DYE: CPT

## 2017-01-01 PROCEDURE — 81001 URINALYSIS AUTO W/SCOPE: CPT | Performed by: EMERGENCY MEDICINE

## 2017-01-01 PROCEDURE — 99223 1ST HOSP IP/OBS HIGH 75: CPT | Mod: AI | Performed by: INTERNAL MEDICINE

## 2017-01-01 PROCEDURE — 87088 URINE BACTERIA CULTURE: CPT | Performed by: INTERNAL MEDICINE

## 2017-01-01 PROCEDURE — 99310 SBSQ NF CARE HIGH MDM 45: CPT | Mod: 25 | Performed by: NURSE PRACTITIONER

## 2017-01-01 PROCEDURE — 96360 HYDRATION IV INFUSION INIT: CPT | Mod: 59

## 2017-01-01 PROCEDURE — 83036 HEMOGLOBIN GLYCOSYLATED A1C: CPT | Performed by: INTERNAL MEDICINE

## 2017-01-01 PROCEDURE — 96365 THER/PROPH/DIAG IV INF INIT: CPT

## 2017-01-01 PROCEDURE — 25800025 ZZH RX 258: Performed by: INTERNAL MEDICINE

## 2017-01-01 PROCEDURE — 84443 ASSAY THYROID STIM HORMONE: CPT | Performed by: INTERNAL MEDICINE

## 2017-01-01 PROCEDURE — 99207 ZZC CDG-MDM COMPONENT: MEETS LOW - DOWN CODED: CPT | Performed by: INTERNAL MEDICINE

## 2017-01-01 PROCEDURE — 25000125 ZZHC RX 250: Performed by: EMERGENCY MEDICINE

## 2017-01-01 PROCEDURE — 82803 BLOOD GASES ANY COMBINATION: CPT

## 2017-01-01 PROCEDURE — 82550 ASSAY OF CK (CPK): CPT | Performed by: EMERGENCY MEDICINE

## 2017-01-01 PROCEDURE — 71020 XR CHEST 2 VW: CPT

## 2017-01-01 PROCEDURE — 92610 EVALUATE SWALLOWING FUNCTION: CPT | Mod: GN

## 2017-01-01 PROCEDURE — 83605 ASSAY OF LACTIC ACID: CPT | Performed by: EMERGENCY MEDICINE

## 2017-01-01 PROCEDURE — 87086 URINE CULTURE/COLONY COUNT: CPT | Performed by: INTERNAL MEDICINE

## 2017-01-01 PROCEDURE — 40000225 ZZH STATISTIC SLP WARD VISIT: Performed by: SPEECH-LANGUAGE PATHOLOGIST

## 2017-01-01 PROCEDURE — 92610 EVALUATE SWALLOWING FUNCTION: CPT | Mod: GN | Performed by: SPEECH-LANGUAGE PATHOLOGIST

## 2017-01-01 PROCEDURE — 82565 ASSAY OF CREATININE: CPT

## 2017-01-01 PROCEDURE — 83605 ASSAY OF LACTIC ACID: CPT

## 2017-01-01 PROCEDURE — 96375 TX/PRO/DX INJ NEW DRUG ADDON: CPT

## 2017-01-01 PROCEDURE — 69210 REMOVE IMPACTED EAR WAX UNI: CPT | Mod: LT | Performed by: NURSE PRACTITIONER

## 2017-01-01 PROCEDURE — 36415 COLL VENOUS BLD VENIPUNCTURE: CPT | Performed by: EMERGENCY MEDICINE

## 2017-01-01 PROCEDURE — 80048 BASIC METABOLIC PNL TOTAL CA: CPT | Performed by: EMERGENCY MEDICINE

## 2017-01-01 PROCEDURE — 93306 TTE W/DOPPLER COMPLETE: CPT

## 2017-01-01 PROCEDURE — 82306 VITAMIN D 25 HYDROXY: CPT | Performed by: INTERNAL MEDICINE

## 2017-01-01 PROCEDURE — 80053 COMPREHEN METABOLIC PANEL: CPT | Performed by: INTERNAL MEDICINE

## 2017-01-01 PROCEDURE — 40000893 ZZH STATISTIC PT IP EVAL DEFER: Performed by: PHYSICAL THERAPIST

## 2017-01-01 PROCEDURE — 87186 SC STD MICRODIL/AGAR DIL: CPT | Performed by: INTERNAL MEDICINE

## 2017-01-01 PROCEDURE — 85610 PROTHROMBIN TIME: CPT | Performed by: EMERGENCY MEDICINE

## 2017-01-01 PROCEDURE — 25000128 H RX IP 250 OP 636: Performed by: PHYSICIAN ASSISTANT

## 2017-01-01 PROCEDURE — 70460 CT HEAD/BRAIN W/DYE: CPT

## 2017-01-01 PROCEDURE — 70498 CT ANGIOGRAPHY NECK: CPT

## 2017-01-01 PROCEDURE — 40000264 ECHO COMPLETE BUBBLE

## 2017-01-01 PROCEDURE — 80061 LIPID PANEL: CPT | Performed by: INTERNAL MEDICINE

## 2017-01-01 RX ORDER — AMOXICILLIN 250 MG
2 CAPSULE ORAL 2 TIMES DAILY PRN
Status: DISCONTINUED | OUTPATIENT
Start: 2017-01-01 | End: 2017-01-01 | Stop reason: HOSPADM

## 2017-01-01 RX ORDER — DULOXETIN HYDROCHLORIDE 30 MG/1
30 CAPSULE, DELAYED RELEASE ORAL DAILY
Status: DISCONTINUED | OUTPATIENT
Start: 2017-01-01 | End: 2017-01-01 | Stop reason: HOSPADM

## 2017-01-01 RX ORDER — CIPROFLOXACIN 500 MG/1
500 TABLET, FILM COATED ORAL 2 TIMES DAILY
Qty: 10 TABLET | Refills: 0 | Status: SHIPPED | OUTPATIENT
Start: 2017-01-01 | End: 2017-01-01

## 2017-01-01 RX ORDER — ASPIRIN 300 MG/1
300 SUPPOSITORY RECTAL ONCE
Status: COMPLETED | OUTPATIENT
Start: 2017-01-01 | End: 2017-01-01

## 2017-01-01 RX ORDER — SODIUM CHLORIDE AND POTASSIUM CHLORIDE 150; 900 MG/100ML; MG/100ML
INJECTION, SOLUTION INTRAVENOUS CONTINUOUS
Status: DISPENSED | OUTPATIENT
Start: 2017-01-01 | End: 2017-01-01

## 2017-01-01 RX ORDER — NICOTINE POLACRILEX 4 MG
15-30 LOZENGE BUCCAL
Status: DISCONTINUED | OUTPATIENT
Start: 2017-01-01 | End: 2017-01-01 | Stop reason: HOSPADM

## 2017-01-01 RX ORDER — ASPIRIN 81 MG/1
81 TABLET, CHEWABLE ORAL DAILY
Status: DISCONTINUED | OUTPATIENT
Start: 2017-01-01 | End: 2017-01-01 | Stop reason: HOSPADM

## 2017-01-01 RX ORDER — ACETAMINOPHEN 325 MG/1
650 TABLET ORAL EVERY 4 HOURS PRN
Status: DISCONTINUED | OUTPATIENT
Start: 2017-01-01 | End: 2017-01-01 | Stop reason: HOSPADM

## 2017-01-01 RX ORDER — NYSTATIN 100000 [USP'U]/G
POWDER TOPICAL 2 TIMES DAILY
COMMUNITY
End: 2017-01-01

## 2017-01-01 RX ORDER — LIDOCAINE 40 MG/G
CREAM TOPICAL
Status: DISCONTINUED | OUTPATIENT
Start: 2017-01-01 | End: 2017-01-01 | Stop reason: HOSPADM

## 2017-01-01 RX ORDER — OLANZAPINE 2.5 MG/1
2.5 TABLET, FILM COATED ORAL 2 TIMES DAILY
Status: DISCONTINUED | OUTPATIENT
Start: 2017-01-01 | End: 2017-01-01 | Stop reason: HOSPADM

## 2017-01-01 RX ORDER — LEVETIRACETAM 1000 MG/1
1000 TABLET ORAL 2 TIMES DAILY
Qty: 60 TABLET | Refills: 0 | Status: SHIPPED | OUTPATIENT
Start: 2017-01-01 | End: 2017-01-01

## 2017-01-01 RX ORDER — CIPROFLOXACIN 250 MG/1
250 TABLET, FILM COATED ORAL EVERY 12 HOURS SCHEDULED
Status: DISCONTINUED | OUTPATIENT
Start: 2017-01-01 | End: 2017-01-01

## 2017-01-01 RX ORDER — GABAPENTIN 100 MG/1
100 CAPSULE ORAL 3 TIMES DAILY
Status: DISCONTINUED | OUTPATIENT
Start: 2017-01-01 | End: 2017-01-01 | Stop reason: HOSPADM

## 2017-01-01 RX ORDER — POTASSIUM CHLORIDE 7.45 MG/ML
10 INJECTION INTRAVENOUS
Status: DISCONTINUED | OUTPATIENT
Start: 2017-01-01 | End: 2017-01-01 | Stop reason: HOSPADM

## 2017-01-01 RX ORDER — SODIUM CHLORIDE 9 MG/ML
INJECTION, SOLUTION INTRAVENOUS CONTINUOUS
Status: DISCONTINUED | OUTPATIENT
Start: 2017-01-01 | End: 2017-01-01

## 2017-01-01 RX ORDER — ONDANSETRON 2 MG/ML
4 INJECTION INTRAMUSCULAR; INTRAVENOUS EVERY 6 HOURS PRN
Status: DISCONTINUED | OUTPATIENT
Start: 2017-01-01 | End: 2017-01-01 | Stop reason: HOSPADM

## 2017-01-01 RX ORDER — LABETALOL HYDROCHLORIDE 5 MG/ML
10-40 INJECTION, SOLUTION INTRAVENOUS EVERY 10 MIN PRN
Status: DISCONTINUED | OUTPATIENT
Start: 2017-01-01 | End: 2017-01-01 | Stop reason: HOSPADM

## 2017-01-01 RX ORDER — ACETAMINOPHEN 650 MG/1
650 SUPPOSITORY RECTAL EVERY 4 HOURS PRN
Status: DISCONTINUED | OUTPATIENT
Start: 2017-01-01 | End: 2017-01-01

## 2017-01-01 RX ORDER — ASPIRIN 81 MG/1
81 TABLET, CHEWABLE ORAL DAILY
COMMUNITY
End: 2017-01-01

## 2017-01-01 RX ORDER — ASPIRIN 300 MG/1
300 SUPPOSITORY RECTAL DAILY
Status: DISCONTINUED | OUTPATIENT
Start: 2017-01-01 | End: 2017-01-01

## 2017-01-01 RX ORDER — ACETAMINOPHEN 650 MG/1
650 SUPPOSITORY RECTAL EVERY 4 HOURS PRN
Status: DISCONTINUED | OUTPATIENT
Start: 2017-01-01 | End: 2017-01-01 | Stop reason: HOSPADM

## 2017-01-01 RX ORDER — LORAZEPAM 2 MG/ML
0.25 CONCENTRATE ORAL EVERY 4 HOURS PRN
Qty: 15 ML | COMMUNITY
Start: 2017-01-01

## 2017-01-01 RX ORDER — ACETAMINOPHEN 650 MG/1
650 SUPPOSITORY RECTAL EVERY 6 HOURS PRN
Qty: 60 SUPPOSITORY | COMMUNITY
Start: 2017-01-01

## 2017-01-01 RX ORDER — BISACODYL 10 MG
10 SUPPOSITORY, RECTAL RECTAL DAILY PRN
Status: DISCONTINUED | OUTPATIENT
Start: 2017-01-01 | End: 2017-01-01 | Stop reason: HOSPADM

## 2017-01-01 RX ORDER — MORPHINE SULFATE 100 MG/5ML
2.5 SOLUTION ORAL
Refills: 0 | COMMUNITY
Start: 2017-01-01

## 2017-01-01 RX ORDER — DONEPEZIL HYDROCHLORIDE 10 MG/1
10 TABLET, FILM COATED ORAL DAILY
Status: DISCONTINUED | OUTPATIENT
Start: 2017-01-01 | End: 2017-01-01 | Stop reason: HOSPADM

## 2017-01-01 RX ORDER — PROCHLORPERAZINE MALEATE 5 MG
5 TABLET ORAL EVERY 6 HOURS PRN
Status: DISCONTINUED | OUTPATIENT
Start: 2017-01-01 | End: 2017-01-01 | Stop reason: HOSPADM

## 2017-01-01 RX ORDER — POLYETHYLENE GLYCOL 3350 17 G/17G
17 POWDER, FOR SOLUTION ORAL DAILY PRN
Status: DISCONTINUED | OUTPATIENT
Start: 2017-01-01 | End: 2017-01-01 | Stop reason: HOSPADM

## 2017-01-01 RX ORDER — NALOXONE HYDROCHLORIDE 0.4 MG/ML
.1-.4 INJECTION, SOLUTION INTRAMUSCULAR; INTRAVENOUS; SUBCUTANEOUS
Status: DISCONTINUED | OUTPATIENT
Start: 2017-01-01 | End: 2017-01-01 | Stop reason: HOSPADM

## 2017-01-01 RX ORDER — OLANZAPINE 10 MG/2ML
2.5 INJECTION, POWDER, FOR SOLUTION INTRAMUSCULAR EVERY 6 HOURS PRN
Status: DISCONTINUED | OUTPATIENT
Start: 2017-01-01 | End: 2017-01-01 | Stop reason: HOSPADM

## 2017-01-01 RX ORDER — ONDANSETRON 2 MG/ML
4 INJECTION INTRAMUSCULAR; INTRAVENOUS EVERY 30 MIN PRN
Status: DISCONTINUED | OUTPATIENT
Start: 2017-01-01 | End: 2017-01-01

## 2017-01-01 RX ORDER — POTASSIUM CHLORIDE 1.5 G/1.58G
20-40 POWDER, FOR SOLUTION ORAL
Status: DISCONTINUED | OUTPATIENT
Start: 2017-01-01 | End: 2017-01-01 | Stop reason: HOSPADM

## 2017-01-01 RX ORDER — POTASSIUM CHLORIDE 1500 MG/1
20-40 TABLET, EXTENDED RELEASE ORAL
Status: DISCONTINUED | OUTPATIENT
Start: 2017-01-01 | End: 2017-01-01 | Stop reason: HOSPADM

## 2017-01-01 RX ORDER — IOPAMIDOL 755 MG/ML
120 INJECTION, SOLUTION INTRAVASCULAR ONCE
Status: COMPLETED | OUTPATIENT
Start: 2017-01-01 | End: 2017-01-01

## 2017-01-01 RX ORDER — LEVETIRACETAM 500 MG/1
500 TABLET ORAL 2 TIMES DAILY
Status: DISCONTINUED | OUTPATIENT
Start: 2017-01-01 | End: 2017-01-01

## 2017-01-01 RX ORDER — PROCHLORPERAZINE 25 MG
12.5 SUPPOSITORY, RECTAL RECTAL EVERY 12 HOURS PRN
Status: DISCONTINUED | OUTPATIENT
Start: 2017-01-01 | End: 2017-01-01 | Stop reason: HOSPADM

## 2017-01-01 RX ORDER — LEVETIRACETAM 500 MG/1
1000 TABLET ORAL 2 TIMES DAILY
Status: DISCONTINUED | OUTPATIENT
Start: 2017-01-01 | End: 2017-01-01 | Stop reason: HOSPADM

## 2017-01-01 RX ORDER — BISACODYL 10 MG
10 SUPPOSITORY, RECTAL RECTAL PRN
COMMUNITY

## 2017-01-01 RX ORDER — LORAZEPAM 2 MG/ML
2 INJECTION INTRAMUSCULAR
Status: DISCONTINUED | OUTPATIENT
Start: 2017-01-01 | End: 2017-01-01 | Stop reason: HOSPADM

## 2017-01-01 RX ORDER — POTASSIUM CL/LIDO/0.9 % NACL 10MEQ/0.1L
10 INTRAVENOUS SOLUTION, PIGGYBACK (ML) INTRAVENOUS
Status: DISCONTINUED | OUTPATIENT
Start: 2017-01-01 | End: 2017-01-01 | Stop reason: HOSPADM

## 2017-01-01 RX ORDER — HYDROMORPHONE HYDROCHLORIDE 1 MG/ML
0.2 INJECTION, SOLUTION INTRAMUSCULAR; INTRAVENOUS; SUBCUTANEOUS
Status: DISCONTINUED | OUTPATIENT
Start: 2017-01-01 | End: 2017-01-01 | Stop reason: HOSPADM

## 2017-01-01 RX ORDER — GABAPENTIN 250 MG/5ML
50 SOLUTION ORAL
Status: ON HOLD | COMMUNITY
End: 2017-01-01

## 2017-01-01 RX ORDER — ONDANSETRON 4 MG/1
4 TABLET, ORALLY DISINTEGRATING ORAL EVERY 6 HOURS PRN
Status: DISCONTINUED | OUTPATIENT
Start: 2017-01-01 | End: 2017-01-01 | Stop reason: HOSPADM

## 2017-01-01 RX ORDER — LEVETIRACETAM 100 MG/ML
1000 SOLUTION ORAL 2 TIMES DAILY
Status: DISCONTINUED | OUTPATIENT
Start: 2017-01-01 | End: 2017-01-01 | Stop reason: HOSPADM

## 2017-01-01 RX ORDER — HYDRALAZINE HYDROCHLORIDE 20 MG/ML
10-20 INJECTION INTRAMUSCULAR; INTRAVENOUS
Status: DISCONTINUED | OUTPATIENT
Start: 2017-01-01 | End: 2017-01-01 | Stop reason: HOSPADM

## 2017-01-01 RX ORDER — DIAZEPAM 5 MG
5 TABLET ORAL
Status: CANCELLED | OUTPATIENT
Start: 2017-01-01

## 2017-01-01 RX ORDER — DEXTROSE MONOHYDRATE 25 G/50ML
25-50 INJECTION, SOLUTION INTRAVENOUS
Status: DISCONTINUED | OUTPATIENT
Start: 2017-01-01 | End: 2017-01-01 | Stop reason: HOSPADM

## 2017-01-01 RX ORDER — MAGNESIUM SULFATE HEPTAHYDRATE 40 MG/ML
4 INJECTION, SOLUTION INTRAVENOUS EVERY 4 HOURS PRN
Status: DISCONTINUED | OUTPATIENT
Start: 2017-01-01 | End: 2017-01-01 | Stop reason: HOSPADM

## 2017-01-01 RX ORDER — POTASSIUM CHLORIDE 29.8 MG/ML
20 INJECTION INTRAVENOUS
Status: DISCONTINUED | OUTPATIENT
Start: 2017-01-01 | End: 2017-01-01 | Stop reason: CLARIF

## 2017-01-01 RX ORDER — NYSTATIN 100000 [USP'U]/G
POWDER TOPICAL 2 TIMES DAILY
COMMUNITY

## 2017-01-01 RX ORDER — CEFTRIAXONE 1 G/1
1 INJECTION, POWDER, FOR SOLUTION INTRAMUSCULAR; INTRAVENOUS EVERY 24 HOURS
Status: DISCONTINUED | OUTPATIENT
Start: 2017-01-01 | End: 2017-01-01 | Stop reason: HOSPADM

## 2017-01-01 RX ORDER — AMOXICILLIN 250 MG
1 CAPSULE ORAL 2 TIMES DAILY PRN
Status: DISCONTINUED | OUTPATIENT
Start: 2017-01-01 | End: 2017-01-01 | Stop reason: HOSPADM

## 2017-01-01 RX ORDER — SODIUM CHLORIDE AND POTASSIUM CHLORIDE 150; 450 MG/100ML; MG/100ML
INJECTION, SOLUTION INTRAVENOUS CONTINUOUS
Status: DISCONTINUED | OUTPATIENT
Start: 2017-01-01 | End: 2017-01-01 | Stop reason: HOSPADM

## 2017-01-01 RX ORDER — LISINOPRIL 10 MG/1
10 TABLET ORAL DAILY
Status: DISCONTINUED | OUTPATIENT
Start: 2017-01-01 | End: 2017-01-01

## 2017-01-01 RX ORDER — BENZTROPINE MESYLATE 1 MG/1
1-2 TABLET ORAL 3 TIMES DAILY PRN
Status: DISCONTINUED | OUTPATIENT
Start: 2017-01-01 | End: 2017-01-01 | Stop reason: HOSPADM

## 2017-01-01 RX ADMIN — ASPIRIN 81 MG 81 MG: 81 TABLET ORAL at 10:22

## 2017-01-01 RX ADMIN — POTASSIUM CHLORIDE AND SODIUM CHLORIDE: 450; 150 INJECTION, SOLUTION INTRAVENOUS at 21:35

## 2017-01-01 RX ADMIN — Medication 2.5 MG: at 09:18

## 2017-01-01 RX ADMIN — GABAPENTIN 100 MG: 100 CAPSULE ORAL at 09:02

## 2017-01-01 RX ADMIN — ASPIRIN 81 MG 81 MG: 81 TABLET ORAL at 09:40

## 2017-01-01 RX ADMIN — VITAMIN D, TAB 1000IU (100/BT) 2000 UNITS: 25 TAB at 09:18

## 2017-01-01 RX ADMIN — LEVETIRACETAM 500 MG: 500 TABLET ORAL at 09:24

## 2017-01-01 RX ADMIN — DONEPEZIL HYDROCHLORIDE 10 MG: 10 TABLET ORAL at 09:27

## 2017-01-01 RX ADMIN — DEXTROSE MONOHYDRATE 25 ML: 25 INJECTION, SOLUTION INTRAVENOUS at 02:35

## 2017-01-01 RX ADMIN — SODIUM CHLORIDE: 9 INJECTION, SOLUTION INTRAVENOUS at 05:38

## 2017-01-01 RX ADMIN — SODIUM CHLORIDE 1000 ML: 9 INJECTION, SOLUTION INTRAVENOUS at 15:48

## 2017-01-01 RX ADMIN — LEVETIRACETAM 1000 MG: 500 TABLET ORAL at 09:39

## 2017-01-01 RX ADMIN — CIPROFLOXACIN HYDROCHLORIDE 250 MG: 250 TABLET, FILM COATED ORAL at 09:15

## 2017-01-01 RX ADMIN — GABAPENTIN 100 MG: 100 CAPSULE ORAL at 20:10

## 2017-01-01 RX ADMIN — GABAPENTIN 100 MG: 100 CAPSULE ORAL at 09:39

## 2017-01-01 RX ADMIN — GABAPENTIN 100 MG: 100 CAPSULE ORAL at 20:14

## 2017-01-01 RX ADMIN — GABAPENTIN 100 MG: 100 CAPSULE ORAL at 20:16

## 2017-01-01 RX ADMIN — OLANZAPINE 2.5 MG: 2.5 TABLET, FILM COATED ORAL at 09:28

## 2017-01-01 RX ADMIN — POTASSIUM CHLORIDE AND SODIUM CHLORIDE: 450; 150 INJECTION, SOLUTION INTRAVENOUS at 04:09

## 2017-01-01 RX ADMIN — GABAPENTIN 100 MG: 100 CAPSULE ORAL at 07:57

## 2017-01-01 RX ADMIN — POTASSIUM CHLORIDE AND SODIUM CHLORIDE: 900; 150 INJECTION, SOLUTION INTRAVENOUS at 12:27

## 2017-01-01 RX ADMIN — LEVETIRACETAM 1000 MG: 500 TABLET ORAL at 20:09

## 2017-01-01 RX ADMIN — GABAPENTIN 100 MG: 100 CAPSULE ORAL at 08:46

## 2017-01-01 RX ADMIN — SODIUM CHLORIDE: 9 INJECTION, SOLUTION INTRAVENOUS at 17:02

## 2017-01-01 RX ADMIN — DONEPEZIL HYDROCHLORIDE 10 MG: 10 TABLET ORAL at 07:55

## 2017-01-01 RX ADMIN — POTASSIUM CHLORIDE AND SODIUM CHLORIDE: 450; 150 INJECTION, SOLUTION INTRAVENOUS at 16:09

## 2017-01-01 RX ADMIN — DULOXETINE 30 MG: 30 CAPSULE, DELAYED RELEASE ORAL at 09:18

## 2017-01-01 RX ADMIN — OLANZAPINE 2.5 MG: 2.5 TABLET, FILM COATED ORAL at 09:40

## 2017-01-01 RX ADMIN — SODIUM CHLORIDE: 9 INJECTION, SOLUTION INTRAVENOUS at 18:30

## 2017-01-01 RX ADMIN — LEVETIRACETAM 1000 MG: 100 SOLUTION ORAL at 20:16

## 2017-01-01 RX ADMIN — DULOXETINE 30 MG: 30 CAPSULE, DELAYED RELEASE ORAL at 08:46

## 2017-01-01 RX ADMIN — CEFTRIAXONE 1 G: 1 INJECTION, POWDER, FOR SOLUTION INTRAMUSCULAR; INTRAVENOUS at 11:47

## 2017-01-01 RX ADMIN — DONEPEZIL HYDROCHLORIDE 10 MG: 10 TABLET ORAL at 08:24

## 2017-01-01 RX ADMIN — DULOXETINE 30 MG: 30 CAPSULE, DELAYED RELEASE ORAL at 15:09

## 2017-01-01 RX ADMIN — DULOXETINE HYDROCHLORIDE 30 MG: 30 CAPSULE, DELAYED RELEASE ORAL at 07:56

## 2017-01-01 RX ADMIN — OLANZAPINE 2.5 MG: 2.5 TABLET, FILM COATED ORAL at 09:03

## 2017-01-01 RX ADMIN — ASPIRIN 81 MG 81 MG: 81 TABLET ORAL at 09:26

## 2017-01-01 RX ADMIN — ASPIRIN 300 MG: 300 SUPPOSITORY RECTAL at 16:33

## 2017-01-01 RX ADMIN — ASPIRIN 300 MG: 300 SUPPOSITORY RECTAL at 19:46

## 2017-01-01 RX ADMIN — Medication 2.5 MG: at 20:22

## 2017-01-01 RX ADMIN — SODIUM CHLORIDE 500 MG: 9 INJECTION, SOLUTION INTRAVENOUS at 19:13

## 2017-01-01 RX ADMIN — GABAPENTIN 100 MG: 100 CAPSULE ORAL at 15:07

## 2017-01-01 RX ADMIN — OLANZAPINE 2.5 MG: 2.5 TABLET, FILM COATED ORAL at 20:14

## 2017-01-01 RX ADMIN — SODIUM CHLORIDE 500 MG: 9 INJECTION, SOLUTION INTRAVENOUS at 20:37

## 2017-01-01 RX ADMIN — GABAPENTIN 100 MG: 100 CAPSULE ORAL at 14:02

## 2017-01-01 RX ADMIN — LEVETIRACETAM 1000 MG: 500 TABLET ORAL at 19:07

## 2017-01-01 RX ADMIN — GABAPENTIN 100 MG: 100 CAPSULE ORAL at 19:07

## 2017-01-01 RX ADMIN — LEVETIRACETAM 1000 MG: 500 TABLET ORAL at 20:17

## 2017-01-01 RX ADMIN — LEVETIRACETAM 1000 MG: 500 TABLET ORAL at 09:02

## 2017-01-01 RX ADMIN — OLANZAPINE 2.5 MG: 2.5 TABLET, FILM COATED ORAL at 20:17

## 2017-01-01 RX ADMIN — LEVETIRACETAM 1000 MG: 100 INJECTION, SOLUTION INTRAVENOUS at 08:11

## 2017-01-01 RX ADMIN — GABAPENTIN 100 MG: 100 CAPSULE ORAL at 09:26

## 2017-01-01 RX ADMIN — DULOXETINE HYDROCHLORIDE 30 MG: 30 CAPSULE, DELAYED RELEASE ORAL at 08:24

## 2017-01-01 RX ADMIN — OLANZAPINE 2.5 MG: 2.5 TABLET, FILM COATED ORAL at 19:07

## 2017-01-01 RX ADMIN — LEVETIRACETAM 1000 MG: 100 SOLUTION ORAL at 08:47

## 2017-01-01 RX ADMIN — GABAPENTIN 100 MG: 100 CAPSULE ORAL at 14:00

## 2017-01-01 RX ADMIN — CIPROFLOXACIN HYDROCHLORIDE 250 MG: 250 TABLET, FILM COATED ORAL at 20:10

## 2017-01-01 RX ADMIN — SODIUM CHLORIDE 100 ML: 9 INJECTION, SOLUTION INTRAVENOUS at 15:19

## 2017-01-01 RX ADMIN — CEFTRIAXONE 1 G: 1 INJECTION, POWDER, FOR SOLUTION INTRAMUSCULAR; INTRAVENOUS at 11:15

## 2017-01-01 RX ADMIN — DULOXETINE HYDROCHLORIDE 30 MG: 30 CAPSULE, DELAYED RELEASE ORAL at 09:40

## 2017-01-01 RX ADMIN — Medication 2.5 MG: at 15:08

## 2017-01-01 RX ADMIN — ASPIRIN 81 MG 81 MG: 81 TABLET ORAL at 07:57

## 2017-01-01 RX ADMIN — ASPIRIN 300 MG: 300 SUPPOSITORY RECTAL at 10:35

## 2017-01-01 RX ADMIN — LEVETIRACETAM 1000 MG: 500 TABLET ORAL at 08:24

## 2017-01-01 RX ADMIN — LEVETIRACETAM 1000 MG: 100 INJECTION, SOLUTION INTRAVENOUS at 21:59

## 2017-01-01 RX ADMIN — GABAPENTIN 100 MG: 100 CAPSULE ORAL at 08:24

## 2017-01-01 RX ADMIN — VITAMIN D, TAB 1000IU (100/BT) 2000 UNITS: 25 TAB at 08:46

## 2017-01-01 RX ADMIN — ACETAMINOPHEN 650 MG: 325 TABLET, FILM COATED ORAL at 15:06

## 2017-01-01 RX ADMIN — ASPIRIN 325 MG: 325 TABLET, DELAYED RELEASE ORAL at 09:18

## 2017-01-01 RX ADMIN — GABAPENTIN 100 MG: 100 CAPSULE ORAL at 16:07

## 2017-01-01 RX ADMIN — DONEPEZIL HYDROCHLORIDE 10 MG: 10 TABLET ORAL at 09:02

## 2017-01-01 RX ADMIN — Medication 2.5 MG: at 08:46

## 2017-01-01 RX ADMIN — GABAPENTIN 100 MG: 100 CAPSULE ORAL at 13:41

## 2017-01-01 RX ADMIN — GABAPENTIN 100 MG: 100 CAPSULE ORAL at 13:52

## 2017-01-01 RX ADMIN — ONDANSETRON 4 MG: 2 INJECTION INTRAMUSCULAR; INTRAVENOUS at 08:36

## 2017-01-01 RX ADMIN — LEVETIRACETAM 1000 MG: 100 INJECTION, SOLUTION INTRAVENOUS at 08:29

## 2017-01-01 RX ADMIN — OLANZAPINE 2.5 MG: 2.5 TABLET, FILM COATED ORAL at 20:10

## 2017-01-01 RX ADMIN — LEVETIRACETAM 1000 MG: 500 TABLET ORAL at 07:55

## 2017-01-01 RX ADMIN — GABAPENTIN 100 MG: 100 CAPSULE ORAL at 20:17

## 2017-01-01 RX ADMIN — IOPAMIDOL 120 ML: 755 INJECTION, SOLUTION INTRAVENOUS at 15:19

## 2017-01-01 RX ADMIN — SODIUM CHLORIDE 500 MG: 9 INJECTION, SOLUTION INTRAVENOUS at 20:08

## 2017-01-01 RX ADMIN — MICONAZOLE NITRATE: 2 POWDER TOPICAL at 09:53

## 2017-01-01 RX ADMIN — GABAPENTIN 100 MG: 100 CAPSULE ORAL at 20:21

## 2017-01-01 RX ADMIN — INFLUENZA A VIRUSA/MICHIGAN/45/2015 X-275 (H1N1) ANTIGEN (FORMALDEHYDE INACTIVATED), INFLUENZA A VIRUS A/HONG KONG/4801/2014 X-263B (H3N2) ANTIGEN (FORMALDEHYDE INACTIVATED), AND INFLUENZA B VIRUS B/BRISBANE/60/2008 ANTIGEN (FORMALDEHYDE INACTIVATED) 0.5 ML: 60; 60; 60 INJECTION, SUSPENSION INTRAMUSCULAR at 15:03

## 2017-01-01 RX ADMIN — SODIUM CHLORIDE 1000 ML: 9 INJECTION, SOLUTION INTRAVENOUS at 18:19

## 2017-01-01 RX ADMIN — LEVETIRACETAM 1000 MG: 100 INJECTION, SOLUTION INTRAVENOUS at 20:25

## 2017-01-01 RX ADMIN — LEVETIRACETAM 1000 MG: 100 INJECTION, SOLUTION INTRAVENOUS at 10:06

## 2017-01-01 RX ADMIN — OLANZAPINE 2.5 MG: 2.5 TABLET, FILM COATED ORAL at 07:56

## 2017-01-01 RX ADMIN — DULOXETINE HYDROCHLORIDE 30 MG: 30 CAPSULE, DELAYED RELEASE ORAL at 09:27

## 2017-01-01 RX ADMIN — Medication 2.5 MG: at 20:16

## 2017-01-01 RX ADMIN — OLANZAPINE 2.5 MG: 2.5 TABLET, FILM COATED ORAL at 08:24

## 2017-01-01 RX ADMIN — ASPIRIN 81 MG 81 MG: 81 TABLET ORAL at 08:24

## 2017-01-01 RX ADMIN — DONEPEZIL HYDROCHLORIDE 10 MG: 10 TABLET ORAL at 09:39

## 2017-01-01 RX ADMIN — SODIUM CHLORIDE 500 ML: 9 INJECTION, SOLUTION INTRAVENOUS at 09:33

## 2017-01-01 RX ADMIN — GABAPENTIN 100 MG: 100 CAPSULE ORAL at 09:18

## 2017-01-01 RX ADMIN — VITAMIN D, TAB 1000IU (100/BT) 2000 UNITS: 25 TAB at 15:08

## 2017-01-01 RX ADMIN — ASPIRIN 81 MG 81 MG: 81 TABLET ORAL at 09:03

## 2017-01-01 RX ADMIN — LEVETIRACETAM 1000 MG: 500 TABLET ORAL at 20:14

## 2017-01-01 RX ADMIN — DULOXETINE HYDROCHLORIDE 30 MG: 30 CAPSULE, DELAYED RELEASE ORAL at 09:03

## 2017-01-01 ASSESSMENT — VISUAL ACUITY
OU: BLINKS TO THREAT

## 2017-01-01 ASSESSMENT — ACTIVITIES OF DAILY LIVING (ADL)
ADLS_ACUITY_SCORE: 25
ADLS_ACUITY_SCORE: 21
ADLS_ACUITY_SCORE: 22
ADLS_ACUITY_SCORE: 25
ADLS_ACUITY_SCORE: 21
ADLS_ACUITY_SCORE: 21
ADLS_ACUITY_SCORE: 24
ADLS_ACUITY_SCORE: 27
ADLS_ACUITY_SCORE: 21
ADLS_ACUITY_SCORE: 25
ADLS_ACUITY_SCORE: 21

## 2017-05-26 PROBLEM — B35.1 ONYCHOMYCOSIS: Status: ACTIVE | Noted: 2017-01-01

## 2017-05-26 PROBLEM — Z00.00 ROUTINE GENERAL MEDICAL EXAMINATION AT A HEALTH CARE FACILITY: Status: ACTIVE | Noted: 2017-01-01

## 2017-05-26 PROBLEM — G30.0 EARLY ONSET ALZHEIMER'S DISEASE WITH BEHAVIORAL DISTURBANCE (H): Status: ACTIVE | Noted: 2017-01-01

## 2017-05-26 PROBLEM — Z80.41 FAMILY HISTORY OF MALIGNANT NEOPLASM OF OVARY: Status: ACTIVE | Noted: 2017-01-01

## 2017-05-26 PROBLEM — R48.2 APRAXIA: Status: ACTIVE | Noted: 2017-01-01

## 2017-05-26 PROBLEM — F02.818 EARLY ONSET ALZHEIMER'S DISEASE WITH BEHAVIORAL DISTURBANCE (H): Status: ACTIVE | Noted: 2017-01-01

## 2017-05-26 PROBLEM — Z86.39 HISTORY OF DIABETES MELLITUS, TYPE II: Status: ACTIVE | Noted: 2017-01-01

## 2017-05-26 PROBLEM — F33.1 MODERATE EPISODE OF RECURRENT MAJOR DEPRESSIVE DISORDER (H): Status: ACTIVE | Noted: 2017-01-01

## 2017-05-26 NOTE — PROGRESS NOTES
[facility]  PRIMARY CARE PROVIDER AND CLINIC:  Brianna Crespo [facility] [address]  [address] / [place]  Chief Complaint   Patient presents with     Establish Care       HPI:    Zahira Sutherland is a 67 year old  (1950),admitted to the [facility] from [facility].  Admitted to this facility for  rehab, medical management and nursing care.    Current issues are:      Early onset Alzheimer's disease with behavioral disturbance> was in [facility] spring [facility]  Her sister Niya reported that Zahira was walking and talking 1 yr ago  She attributes the decline to the time Zahira spent in [facility] last yr    Hypertension goal BP (blood pressure) < 130/80  BP today ~125/75    Moderate episode of recurrent major depressive disorder (H)  Has had a hx of depression  Has flat affect> looks unhappy    Obesity due to excess calories, unspecified obesity severity  Her sister stated that Zahira has been obese  Weight from prior facility is 220 lbs     Apraxia  She had diff following commands    Bilateral impacted cerumen  Dany cerumen  - REMOVE IMPACTED CERUMEN> with lighted cerumen loop  She only allowed removal from L ear canal    Vitamin D deficiency  Per her sister she had hx of low Vit D level    Onychomycosis> toe nails  Observed during physical exam    Advanced directives, counseling/discussion  per conversation with sister Niya  - Full Code    CODE STATUS/ADVANCE DIRECTIVES DISCUSSION:   CPR/Full code   Patient's living condition: lives in an assisted living facility    ALLERGIES:Penicillins  PAST MEDICAL HISTORY:  has a past medical history of Dementia; Diabetes; and Hypertension.  PAST SURGICAL HISTORY:  has a past surgical history that includes surgical history of - (1980s); gastric bypass (1992); Cholecystectomy; and back surgery.  FAMILY HISTORY: family history includes Breast Cancer in her mother; CEREBROVASCULAR  "DISEASE in her mother; Cancer - colorectal in her father; DIABETES in her father; HEART DISEASE in her mother; Hypertension in her sister; Obesity in her brother, father, mother, and sister; Other Cancer in her father. Ovarian cancer in her sister.  SOCIAL HISTORY:  reports that she has never smoked. She has never used smokeless tobacco. She reports that she does not drink alcohol or use illicit drugs.    Post Discharge Medication Reconciliation Status: discharge medications reconciled and changed, per note/orders (see AVS).  Current Outpatient Prescriptions   Medication Sig Dispense Refill     GABAPENTIN PO Take 100 mg by mouth 3 times daily       nystatin (MYCOSTATIN) 728456 UNIT/GM POWD Apply topically 3 times daily Apply to (R) side Abd. Folds topically three times a day for Rash(chronic redness)       OLANZAPINE PO Take 2.5 mg by mouth 2 times daily       DULOXETINE HCL PO Take 30 mg by mouth daily       gabapentin (NEURONTIN) 250 MG/5ML solution Take 50 mg by mouth every 2 hours as needed Not to exceed 6 as needed doses/24 hours       cholecalciferol 2000 UNITS CAPS Take 1 capsule by mouth daily       donepezil (ARICEPT) 10 MG tablet Take 1 tablet (10 mg) by mouth At Bedtime 30 tablet 0     lisinopril (PRINIVIL,ZESTRIL) 10 MG tablet Take 1 tablet (10 mg) by mouth daily 30 tablet 0     aspirin 81 MG tablet Take 1 tablet (81 mg) by mouth daily 90 tablet 3     Multiple Vitamin (MULTIVITAMIN) per tablet Take 1 tablet by mouth daily. 90 tablet 3       ROS:  Unobtainable secondary to cognitive impairment or aphasia, but today pt reports > she did not like the cerumen removal    Exam:  /76  Pulse 64  Temp 97.4  F (36.3  C)  Resp 18  Ht 5' 4\" (1.626 m)  Wt 220 lb (99.8 kg)  SpO2 98%  BMI 37.76 kg/m2  GENERAL APPEARANCE:  Alert, in no distress, morbidly obese, uncooperative, unhappy  ENT:  Mouth and posterior oropharynx normal, moist mucous membranes, ear canal:TM not visualized secondary to cerumen,L ear " canal> cerumen removed with manual debridement, minor bleeding after removal of wax> she did not allow me to remove cerumen from R canal  EYES:  EOM, conjunctivae, lids, pupils and irises normal, cataract present bilateral  NECK:  No adenopathy,masses or thyromegaly  RESP:  respiratory effort and palpation of chest normal, lungs clear to auscultation , no respiratory distress  CV:  Palpation and auscultation of heart done , regular rate and rhythm, no murmur, rub, or gallop, no edema  CHEST (BREASTS):  Inspection of breasts are symmetrical and no discharge  ABDOMEN:  normal bowel sounds, soft, nontender, no hepatosplenomegaly or other masses  M/S:   Gait and station abnormal > she did squeeze my hands, has symmetrical strength  She allowed to flex her knees> motion was very limited  She is transferred by staff with a pablo lift  SKIN:  Per nursing she has a small open area on the coccyx area  She had thickened, long toe nails  NEURO:   Cranial nerves 2-12 are normal tested and grossly at patient's baseline  She had diff following command  PSYCH:  oriented to herself, insight and judgement impaired, memory impaired , angry and sad looking    Lab/Diagnostic data:  CBC RESULTS:   Recent Labs   Lab Test  05/13/13   1432  08/17/11   1815   WBC  9.0  12.4*   RBC  4.57  4.40   HGB  14.2  13.9   HCT  43.8  42.7   MCV  96  97   MCH  31.1  31.6   MCHC  32.4  32.6   RDW  13.6  14.4   PLT  190  205       Last Basic Metabolic Panel:  Recent Labs   Lab Test  05/13/13   1432  08/17/11   1815   NA  141  141   POTASSIUM  4.0  4.2   CHLORIDE  102  106   EVY  9.3  8.4*   CO2  26  25   BUN  12  17   CR  0.92  0.95   GLC  89  90       Liver Function Studies -   Recent Labs   Lab Test  08/17/11   1815  08/15/11   1429   PROTTOTAL  6.3*  7.2   ALBUMIN  3.6  3.7   BILITOTAL  0.3  0.5   ALKPHOS  75  73   AST  63*  39   ALT  37  44       TSH   Date Value Ref Range Status   05/13/2013 2.43 0.4 - 5.0 mU/L Final   08/15/2011 2.33 0.4 - 5.0 mU/L  Final     Lab Results   Component Value Date    A1C 5.9 08/15/2011    A1C 5.9 07/26/2011       ASSESSMENT/PLAN:  Early onset Alzheimer's disease with behavioral disturbance> was in [facility]  Will cont with current tx  I did order labs  TSH, B12, for screen    Hypertension goal BP (blood pressure) < 130/80  Cont with current tx  I did order BMP    Moderate episode of recurrent major depressive disorder (H)  Will cont with current tx    Obesity due to excess calories, unspecified obesity severity  I encouraged family to bring only healthy snacks for her  I did order A1c since EPIC indicated that she had T2DM in the past    Apraxia  I have referred her for PT, OT and SLP eval and tx    Bilateral impacted cerumen  Removed large amt to cerumen from L canal, minimal bleeding afterwards  - REMOVE IMPACTED CERUMEN    Vitamin D deficiency  I did start Vit D 2000 units po daily  Will also check Vit D blood level    Onychomycosis> toe nails  I encouraged family to sign consent for her to be seen by podiatry    Advanced directives, counseling/discussion  Per her sister Niya> family wishes  - Full Code     Orders:  See A&P    Information reviewed:  Medications, vital signs, orders, nursing notes, problem list, hospital information. Total time spent with patient visit was 60 min including patient visit, review of past records, phone call to patient contact and I spent ~ 20 min on the phone with her sister Niya. Greater than 50% of total time spent with counseling and coordinating care.    Electronically signed by:  GABRIELE Brooks CNP

## 2017-06-06 NOTE — PROGRESS NOTES
[facility]    Chief Complaint   Patient presents with     [facility] Regulatory       HPI:    Zahira Sutherland is a 67 year old  (1950), who is being seen today for a federally mandated E/M visit at [facility].  HPI information obtained from: facility chart records. Today's report:    Came here in late May.   There is a sister and daughter that live nearby.    She was in geropsychiatry spring 2016.  She lived in [place] at that time. A pet was taken away and she went to LT and subsequently to [facility].  Sister believes she has been worse since then.     She is obese.   Nonambulatory.  Requires [facility] transfer.     Brianna has not observed her interacting.     She was not able to follow commands well.   She did allow Brianna to remove wax from one ear and not the other.     She has been referred to therapies.      ALLERGIES: Penicillins  PAST MEDICAL HISTORY:  has a past medical history of Dementia; Diabetes; and Hypertension.  PAST SURGICAL HISTORY:  has a past surgical history that includes surgical history of - (1980s); gastric bypass (1992); Cholecystectomy; and back surgery.  FAMILY HISTORY: family history includes Breast Cancer in her mother; CEREBROVASCULAR DISEASE in her mother; Cancer - colorectal in her father; DIABETES in her father; HEART DISEASE in her mother; Hypertension in her sister; Obesity in her brother, father, mother, and sister; Other Cancer in her father.  SOCIAL HISTORY:  reports that she has never smoked. She has never used smokeless tobacco. She reports that she does not drink alcohol or use illicit drugs.    MEDICATIONS:  Current Outpatient Prescriptions   Medication Sig Dispense Refill     GABAPENTIN PO Take 100 mg by mouth 3 times daily       nystatin (MYCOSTATIN) 799007 UNIT/GM POWD Apply topically 2 times daily Apply to (R) side Abd. Folds topically three times a day for Rash(chronic redness)        OLANZAPINE PO Take 2.5 mg by mouth 2 times  "daily       DULOXETINE HCL PO Take 30 mg by mouth daily       gabapentin (NEURONTIN) 250 MG/5ML solution Take 50 mg by mouth every 2 hours as needed Not to exceed 6 as needed doses/24 hours       cholecalciferol 2000 UNITS CAPS Take 1 capsule by mouth daily       donepezil (ARICEPT) 10 MG tablet Take 1 tablet (10 mg) by mouth At Bedtime 30 tablet 0     lisinopril (PRINIVIL,ZESTRIL) 10 MG tablet Take 1 tablet (10 mg) by mouth daily 30 tablet 0     aspirin 81 MG tablet Take 1 tablet (81 mg) by mouth daily 90 tablet 3     Multiple Vitamin (MULTIVITAMIN) per tablet Take 1 tablet by mouth daily. 90 tablet 3     Medications reviewed:  Medications reconciled to facility chart and changes were made to reflect current medications as identified as above med list. Below are the changes that were made:   Medications stopped since last EPIC medication reconciliation:   There are no discontinued medications.    Medications started since last [facility] medication reconciliation:  No orders of the defined types were placed in this encounter.        ROS:  Unobtainable secondary to cognitive impairment or aphasia.  She was very irritated with us seeing her.     Staff has reported that she pushes them away and will sometimes hit.    Exam:  Vitals: /78  Pulse 82  Temp 97.6  F (36.4  C)  Resp 18  Ht 5' 6\" (1.676 m)  Wt 249 lb (112.9 kg)  SpO2 95%  BMI 40.19 kg/m2  BMI= Body mass index is 40.19 kg/(m^2).  GENERAL APPEARANCE:  Alert, in no distress  RESP:  lungs clear to auscultation   CV:  Regular rate and rhythm.  M/S:   No edema  PSYCH:  memory impaired , affect and mood agitated    Lab/Diagnostic data:  TSH 3.89.  Hgb A1C 5.5. CBC OK. Bmp OK.  B12 595. Vit D 24.    ASSESSMENT/PLAN  Hypertension goal BP (blood pressure) < 130/80  Blood pressure OK.    Early onset Alzheimer's disease with behavioral disturbance> was in [facility]  Continue to observe. She is on Aricept and olanzapine.   No dose change at this " time. Will monitor behaviors; provide support.     Moderate episode of recurrent major depressive disorder (H)  Has been to [facility]. Continuing current medication at this time.     Morbid obesity, unspecified obesity type (H)  This is despite gastric bypass years ago.    Family history of malignant neoplasm of ovary  As per family request, Ca 125 was checked and was OK.    Bariatric surgery status  Metabolic parameters have been checked. Vitamin D insufficiency; other things OK. Will check iron status; hemoglobin OK.     Vitamin D insufficiency  Has started on 2000 iu vitamin D3 daily.      May consider decrease olanzapine.    Electronically signed by:  Shantal Lopez MD, MD     The health plan new enrollment has happened. I have reviewed the  MDS, the preventative needs,  and facility care plan. The level of care is appropriate. I have reviewed the code status/advanced directives.

## 2017-06-07 PROBLEM — E66.01 MORBID OBESITY, UNSPECIFIED OBESITY TYPE (H): Status: ACTIVE | Noted: 2017-01-01

## 2017-06-07 PROBLEM — Z98.84 BARIATRIC SURGERY STATUS: Status: ACTIVE | Noted: 2017-01-01

## 2017-06-07 NOTE — MR AVS SNAPSHOT
After Visit Summary   6/7/2017    Zahira Sutherland    MRN: [id number]           Patient Information     Date Of Birth          1950        Visit Information        Provider Department      6/7/2017 6:21 PM Shantal Lopez MD [facility]        Today's Diagnoses     Hypertension goal BP (blood pressure) < 130/80    -  1    Early onset Alzheimer's disease with behavioral disturbance> was in [facility]        Moderate episode of recurrent major depressive disorder (H)        Morbid obesity, unspecified obesity type (H)        Family history of malignant neoplasm of ovary        Bariatric surgery status        Vitamin D insufficiency           Follow-ups after your visit        Who to contact     If you have questions or need follow up information about today's clinic visit or your schedule please contact [facility] directly at [phone].  Normal or non-critical lab and imaging results will be communicated to you by MyChart, letter or phone within 4 business days after the clinic has received the results. If you do not hear from us within 7 days, please contact the clinic through [employer] or phone. If you have a critical or abnormal lab result, we will notify you by phone as soon as possible.  Submit refill requests through [employer] or call your pharmacy and they will forward the refill request to us. Please allow 3 business days for your refill to be completed.          Additional Information About Your Visit        MyChart Information     [employer] gives you secure access to your electronic health record. If you see a primary care provider, you can also send messages to your care team and make appointments. If you have questions, please call your primary care clinic.  If you do not have a primary care provider, please call [phone] and they will assist you.        Care EveryWhere ID     This is your Care EveryWhere ID. This could be used by other  "organizations to access your [facility] medical records  [id number]        Your Vitals Were     Pulse Temperature Respirations Height Pulse Oximetry BMI (Body Mass Index)    82 97.6  F (36.4  C) 18 5' 6\" (1.676 m) 95% 40.19 kg/m2       Blood Pressure from Last 3 Encounters:   06/06/17 125/78   05/26/17 124/76   05/13/13 124/80    Weight from Last 3 Encounters:   06/06/17 249 lb (112.9 kg)   05/26/17 220 lb (99.8 kg)   05/13/13 292 lb (132.5 kg)              Today, you had the following     No orders found for display       Primary Care Provider Office Phone # Fax #    Brianna GABRIELE Masterson [phone]       [facility] [address]  [address]  [place] [address]        Thank you!     Thank you for choosing [facility]  for your care. Our goal is always to provide you with excellent care. Hearing back from our patients is one way we can continue to improve our services. Please take a few minutes to complete the written survey that you may receive in the mail after your visit with us. Thank you!             Your Updated Medication List - Protect others around you: Learn how to safely use, store and throw away your medicines at www.disposemymeds.org.          This list is accurate as of: 6/7/17  1:18 PM.  Always use your most recent med list.                   Brand Name Dispense Instructions for use    aspirin 81 MG tablet     90 tablet    Take 1 tablet (81 mg) by mouth daily       cholecalciferol 2000 UNITS Caps      Take 1 capsule by mouth daily       donepezil 10 MG tablet    ARICEPT    30 tablet    Take 1 tablet (10 mg) by mouth At Bedtime       DULOXETINE HCL PO      Take 30 mg by mouth daily       * GABAPENTIN PO      Take 100 mg by mouth 3 times daily       * gabapentin 250 MG/5ML solution    NEURONTIN     Take 50 mg by mouth every 2 hours as needed Not to exceed 6 as needed doses/24 hours       lisinopril 10 MG tablet    PRINIVIL/ZESTRIL    30 tablet    " Take 1 tablet (10 mg) by mouth daily       multivitamin per tablet     90 tablet    Take 1 tablet by mouth daily.       nystatin [id number] UNIT/GM Powd    MYCOSTATIN     Apply topically 2 times daily Apply to (R) side Abd. Folds topically three times a day for Rash(chronic redness)       OLANZAPINE PO      Take 2.5 mg by mouth 2 times daily       * Notice:  This list has 2 medication(s) that are the same as other medications prescribed for you. Read the directions carefully, and ask your doctor or other care provider to review them with you.

## 2017-07-07 PROBLEM — I69.990: Status: ACTIVE | Noted: 2017-01-01

## 2017-07-07 PROBLEM — R47.89 WORD FINDING DIFFICULTY: Status: ACTIVE | Noted: 2017-01-01

## 2017-07-07 NOTE — PROGRESS NOTES
[facility]    Chief Complaint   Patient presents with     RECHECK       HPI:    Zahira Sutherland is a 67 year old  (1950), who is being seen today for an episodic care visit at [facility].  HPI information obtained from: facility chart records.  Today's concern is:F/u on current chronic health issues  Early onset Alzheimer's disease with behavioral disturbance> was in [facility]  She is dependent for most ADLs    Word finding difficulty  She was attempting to tell me something, but did not find the word    Moderate episode of recurrent major depressive disorder (H)  Her mood has been improved per nursing staff    Apraxia  She is not able to follow commands    BMI 40.0 44.9, adult (H)  BMI 40.45    History of diabetes mellitus, type II  Lab Results   Component Value Date    A1C 5.5 05/31/2017    A1C 5.9 08/15/2011    A1C 5.9 07/26/2011       ALLERGIES: Penicillins  Past Medical, Surgical, Family and Social History reviewed and updated in [facility].    Current Outpatient Prescriptions   Medication Sig Dispense Refill     GABAPENTIN PO Take 100 mg by mouth 3 times daily       nystatin (MYCOSTATIN) [id number] UNIT/GM POWD Apply topically 2 times daily Apply to (R) side Abd. Folds topically three times a day for Rash(chronic redness)        OLANZAPINE PO Take 2.5 mg by mouth 2 times daily       DULOXETINE HCL PO Take 30 mg by mouth daily       gabapentin (NEURONTIN) 250 MG/5ML solution Take 50 mg by mouth every 2 hours as needed Not to exceed 6 as needed doses/24 hours       cholecalciferol 2000 UNITS CAPS Take 1 capsule by mouth daily       donepezil (ARICEPT) 10 MG tablet Take 1 tablet (10 mg) by mouth At Bedtime 30 tablet 0     lisinopril (PRINIVIL,ZESTRIL) 10 MG tablet Take 1 tablet (10 mg) by mouth daily 30 tablet 0     aspirin 81 MG tablet Take 1 tablet (81 mg) by mouth daily 90 tablet 3     Multiple Vitamin (MULTIVITAMIN) per tablet Take 1 tablet by mouth  "daily. 90 tablet 3     Medications reviewed:  Medications reconciled to facility chart and changes were made to reflect current medications as identified as above med list. Below are the changes that were made:   Medications stopped since last EPIC medication reconciliation:   There are no discontinued medications.    Medications started since last [facility] medication reconciliation:  No orders of the defined types were placed in this encounter.      REVIEW OF SYSTEMS:  4 point ROS including Respiratory, CV, GI and , other than that noted in the HPI,  is negative    Physical Exam:  /80  Pulse 60  Temp 96.8  F (36  C)  Resp 16  Ht 5' 6\" (1.676 m)  Wt 251 lb 3.2 oz (113.9 kg)  SpO2 96%  BMI 40.54 kg/m2  GENERAL APPEARANCE:  Alert, in no distress, morbidly obese, cooperative  ENT:  Mouth and posterior oropharynx normal, moist mucous membranes, normal hearing acuity  EYES:  EOM, conjunctivae, lids, pupils and irises normal  RESP:  respiratory effort and palpation of chest normal, lungs clear to auscultation , no respiratory distress  CV:  Palpation and auscultation of heart done , regular rate and rhythm, no murmur, rub, or gallop,   ABDOMEN:  Palpation and auscultation of heart done , regular rate and rhythm, no murmur, rub, or gallop, has very large LE  M/S:   Gait and station abnormal >.Depends on staff for transfers, is transported by staff with a w/c  SKIN:  Soft and intact  NEURO:   Cranial nerves 2-12 are normal tested and grossly at patient's baseline  PSYCH:  oriented to herself, insight and judgement impaired, memory impaired , affect and mood normal     BP 05/25-07/02: 111-135/70-70mmHg    Recent Labs:    CBC RESULTS:   Recent Labs   Lab Test  05/31/17   0535  05/13/13   1432   WBC  7.8  9.0   RBC  4.35  4.57   HGB  14.0  14.2   HCT  42.7  43.8   MCV  98  96   MCH  32.2  31.1   MCHC  32.8  32.4   RDW  13.6  13.6   PLT  170  190       Last Basic Metabolic Panel:  Recent Labs   Lab Test  05/31/17   " 0535  05/13/13   1432   NA  143  141   POTASSIUM  3.8  4.0   CHLORIDE  109  102   EVY  8.8  9.3   CO2  25  26   BUN  18  12   CR  0.72  0.92   GLC  77  89     TSH   Date Value Ref Range Status   05/31/2017 3.89 0.40 - 4.00 mU/L Final   05/13/2013 2.43 0.4 - 5.0 mU/L Final       Lab Results   Component Value Date    A1C 5.5 05/31/2017    A1C 5.9 08/15/2011     Assessment/Plan:  Early onset Alzheimer's disease with behavioral disturbance> was [facility]  No change in tx  Especially since her mood is much improved and per nursing she is less aggressive    Word finding difficulty  Will cont with supportive care    Moderate episode of recurrent major depressive disorder (H)  Decrease of DULoxetine may cause emotional harm  No change in tx    Apraxia  Cont with supportive care     History of diabetes mellitus, type II  No tx at this time  Will recheck A1c in November 2017    BMI 40.0-44.9, adult (H)  She is non ambulatory, diff to burn off calories      Orders:  none    Time > 25 min    Electronically signed by  GABRIELE Brooks CNP

## 2017-07-12 PROBLEM — E66.01 MORBID OBESITY, UNSPECIFIED OBESITY TYPE (H): Status: ACTIVE | Noted: 2017-01-01

## 2017-07-12 NOTE — MR AVS SNAPSHOT
After Visit Summary   7/12/2017    Zahira Sutherland    MRN: [id number]           Patient Information     Date Of Birth          1950        Visit Information        Provider Department      7/12/2017 9:31 AM Shantal Lopez MD [facility]        Today's Diagnoses     Early onset Alzheimer's disease with behavioral disturbance> was [facility]    -  1    Morbid obesity, unspecified obesity type (H)        Bariatric surgery status        Hypertension goal BP (blood pressure) < 130/80        Moderate episode of recurrent major depressive disorder (H)        Word finding difficulty           Follow-ups after your visit        Who to contact     If you have questions or need follow up information about today's clinic visit or your schedule please contact [facility] directly at [phone].  Normal or non-critical lab and imaging results will be communicated to you by MyChart, letter or phone within 4 business days after the clinic has received the results. If you do not hear from us within 7 days, please contact the clinic through MyChart or phone. If you have a critical or abnormal lab result, we will notify you by phone as soon as possible.  Submit refill requests through [employer] or call your pharmacy and they will forward the refill request to us. Please allow 3 business days for your refill to be completed.          Additional Information About Your Visit        MyChart Information     [employer] gives you secure access to your electronic health record. If you see a primary care provider, you can also send messages to your care team and make appointments. If you have questions, please call your primary care clinic.  If you do not have a primary care provider, please call [phone] and they will assist you.        Care EveryWhere ID     This is your Care EveryWhere ID. This could be used by other organizations to access your [facility]  "records  [id number]        Your Vitals Were     Pulse Temperature Respirations Height Pulse Oximetry BMI (Body Mass Index)    64 97.2  F (36.2  C) 18 5' 6\" (1.676 m) 95% 40.13 kg/m2       Blood Pressure from Last 3 Encounters:   07/12/17 130/68   07/07/17 124/80   06/06/17 125/78    Weight from Last 3 Encounters:   07/12/17 248 lb 9.6 oz (112.8 kg)   07/07/17 251 lb 3.2 oz (113.9 kg)   06/06/17 249 lb (112.9 kg)              Today, you had the following     No orders found for display       Primary Care Provider Office Phone # Fax #    Brianna Rosaripee Crespo, APRN [phone]       [facility] [address]  [address]  [place] [address]        Equal Access to Services     [facility] : Hadii aad ku hadasho Soomaali, waaxda luqadaha, qaybta kaalmada adeegyada, waxay xiomarain hayeugenen jonah rice'reshma . So [facility] [phone].    ATENCIÓN: Si habla español, tiene a candelario disposición servicios gratuitos de asistencia lingüística. Llame al [phone].    We comply with applicable federal civil rights laws and [place] laws. We do not discriminate on the basis of race, color, national origin, age, disability sex, sexual orientation or gender identity.            Thank you!     Thank you for choosing [facility]  for your care. Our goal is always to provide you with excellent care. Hearing back from our patients is one way we can continue to improve our services. Please take a few minutes to complete the written survey that you may receive in the mail after your visit with us. Thank you!             Your Updated Medication List - Protect others around you: Learn how to safely use, store and throw away your medicines at www.disposemymeds.org.          This list is accurate as of: 7/12/17  2:15 PM.  Always use your most recent med list.                   Brand Name Dispense Instructions for use Diagnosis    aspirin 81 MG tablet     90 tablet    Take 1 tablet (81 mg) by mouth " daily    Hypertension goal BP (blood pressure) < 130/80       cholecalciferol 2000 UNITS Caps      Take 1 capsule by mouth daily        donepezil 10 MG tablet    ARICEPT    30 tablet    Take 1 tablet (10 mg) by mouth At Bedtime    Senile dementia, without behavioral disturbance       DULOXETINE HCL PO      Take 30 mg by mouth daily        * GABAPENTIN PO      Take 100 mg by mouth 3 times daily        * gabapentin 250 MG/5ML solution    NEURONTIN     Take 50 mg by mouth every 2 hours as needed Not to exceed 6 as needed doses/24 hours        lisinopril 10 MG tablet    PRINIVIL/ZESTRIL    30 tablet    Take 1 tablet (10 mg) by mouth daily    Hypertension goal BP (blood pressure) < 130/80       multivitamin per tablet     90 tablet    Take 1 tablet by mouth daily.    Hypertension goal BP (blood pressure) < 130/80       nystatin 967762 UNIT/GM Powd    MYCOSTATIN     Apply topically 2 times daily Apply to (R) side Abd. Folds topically three times a day for Rash(chronic redness)        OLANZAPINE PO      Take 2.5 mg by mouth 2 times daily        * Notice:  This list has 2 medication(s) that are the same as other medications prescribed for you. Read the directions carefully, and ask your doctor or other care provider to review them with you.
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
## 2017-07-24 NOTE — PROGRESS NOTES
This patient's medication list and chart were reviewed as part of the service provided by [facility].    Assessment/Recommendations:  1. (Dementia w/agitation):  Please consider reduction in Olanzapine to 2.5mg daily and monitor target symptoms.  Olanzapine is a once daily medication, and pt currently taking bid.  Additionally, this med has anticholinergic properties, and can actually worsen confusion.  Also may increase weight, lipids, blood sugars.  Behaviors wax and wane in dementia and appropriate to attempt gradual dose reduction and monitor symptoms.  If tolerates reduction to 2.5mg daily, could attempt d'c in a couple months from dose reduction.  If find that continued use of an antipsychotic is warranted due to pt being threat to herself or others, or significant distress caused to patient, may be more appropriate to use Risperidone vs Olanzapine as Risperidone tends to be more effective for anxiety or agitation in dementia.      Angeles Hirsch, Pharm.D.,[facility]  Board Certified Geriatric Pharmacist  Medication Therapy Management Pharmacist  [phone]
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
## 2017-09-20 NOTE — MR AVS SNAPSHOT
After Visit Summary   9/20/2017    Zahira Sutherland    MRN: [id number]           Patient Information     Date Of Birth          1950        Visit Information        Provider Department      9/20/2017 9:19 PM Shantal Lopez MD [facility]        Today's Diagnoses     Hypertension goal BP (blood pressure) < 130/80    -  1    Early onset Alzheimer's disease with behavioral disturbance> [facility]        Moderate episode of recurrent major depressive disorder (H)        Morbid obesity, unspecified obesity type (H)        History of diabetes mellitus, type II           Follow-ups after your visit        Who to contact     If you have questions or need follow up information about today's clinic visit or your schedule please contact [facility] directly at [phone].  Normal or non-critical lab and imaging results will be communicated to you by MyChart, letter or phone within 4 business days after the clinic has received the results. If you do not hear from us within 7 days, please contact the clinic through MyChart or phone. If you have a critical or abnormal lab result, we will notify you by phone as soon as possible.  Submit refill requests through [facility] or call your pharmacy and they will forward the refill request to us. Please allow 3 business days for your refill to be completed.          Additional Information About Your Visit        MyChart Information     [facility] gives you secure access to your electronic health record. If you see a primary care provider, you can also send messages to your care team and make appointments. If you have questions, please call your primary care clinic.  If you do not have a primary care provider, please call [phone] and they will assist you.        Care EveryWhere ID     This is your Care EveryWhere ID. This could be used by other organizations to access your [place] medical records  [id number]       "  Your Vitals Were     Pulse Temperature Respirations Height Pulse Oximetry BMI (Body Mass Index)    55 98  F (36.7  C) 16 5' 6\" (1.676 m) 100% 40.72 kg/m2       Blood Pressure from Last 3 Encounters:   09/19/17 105/71   09/15/17 99/60   08/04/17 124/72    Weight from Last 3 Encounters:   09/19/17 252 lb 4.8 oz (114.4 kg)   09/15/17 251 lb 11.2 oz (114.2 kg)   08/04/17 250 lb 9.6 oz (113.7 kg)              Today, you had the following     No orders found for display       Primary Care Provider Office Phone # Fax #    Brianna Bullock JoslynJenna, APRN [facility] [phone]       [address]  [facility] [address]  [place] [address]        Equal Access to Services     [facility]: Hadii aad ku hadasho Soomaali, waaxda luqadaha, qaybta kaalmada adeegyada, waxay idiin hayaan adedanita kharatish guerra . So [facility] [phone].    ATENCIÓN: Si habla español, tiene a candelario disposición servicios gratuitos de asistencia lingüística. Charissa al [phone].    We comply with applicable federal civil rights laws and [place] laws. We do not discriminate on the basis of race, color, national origin, age, disability sex, sexual orientation or gender identity.            Thank you!     [facility] you for [facility]  [facility]. Our goal is always to provide you with excellent care. Hearing back from our patients is one way we can continue to improve our services. Please take a few minutes to complete the written survey that you may receive in the mail after your visit with us. Thank you!             Your Updated Medication List - Protect others around you: Learn how to safely use, store and throw away your medicines at www.disposemymeds.org.          This list is accurate as of: 9/20/17  9:20 AM.  Always use your most recent med list.                   Brand Name Dispense Instructions for use Diagnosis    aspirin 81 MG tablet     90 tablet    Take 1 tablet (81 mg) by mouth daily    Hypertension goal BP (blood " pressure) < 130/80       cholecalciferol 2000 UNITS Caps      Take 1 capsule by mouth daily        donepezil 10 MG tablet    ARICEPT    30 tablet    Take 1 tablet (10 mg) by mouth At Bedtime    Senile dementia, without behavioral disturbance       DULOXETINE HCL PO      Take 30 mg by mouth daily        * GABAPENTIN PO      Take 100 mg by mouth 3 times daily        * gabapentin 250 MG/5ML solution    NEURONTIN     Take 50 mg by mouth every 2 hours as needed Not to exceed 6 as needed doses/24 hours        lisinopril 10 MG tablet    PRINIVIL/ZESTRIL    30 tablet    Take 1 tablet (10 mg) by mouth daily    Hypertension goal BP (blood pressure) < 130/80       multivitamin per tablet     90 tablet    Take 1 tablet by mouth daily.    Hypertension goal BP (blood pressure) < 130/80       nystatin 103227 UNIT/GM Powd    MYCOSTATIN     Apply topically 2 times daily Apply to (R) side Abd. Folds topically three times a day for Rash(chronic redness)        OLANZAPINE PO      Take 2.5 mg by mouth 2 times daily        * Notice:  This list has 2 medication(s) that are the same as other medications prescribed for you. Read the directions carefully, and ask your doctor or other care provider to review them with you.

## 2017-09-20 NOTE — PROGRESS NOTES
[facility]    Chief Complaint   Patient presents with     half-way Regulatory       HPI:    Zahira Sutherland is a 67 year old  (1950), who is being seen today for a federally mandated E/M visit at [facility].  HPI information obtained from: facility chart records. Today's report:    Early onset Alzheimers.  She does have difficult behaviours.  She does not understand what people tell her.  She has been aggressive.  She was in [facility] in [facility].  She is on Olanzapine bid.   On Aricept at this time.     On Duloxetine for depression.     Has HTN.    On asprin for prevention due to family wishes.    ALLERGIES: Penicillins  PAST MEDICAL HISTORY:  has a past medical history of Dementia; Diabetes; and Hypertension.  PAST SURGICAL HISTORY:  has a past surgical history that includes surgical history of - (1980s); gastric bypass (1992); Cholecystectomy; and back surgery.  FAMILY HISTORY: family history includes Breast Cancer in her mother; CEREBROVASCULAR DISEASE in her mother; Cancer - colorectal in her father; DIABETES in her father; HEART DISEASE in her mother; Hypertension in her sister; Obesity in her brother, father, mother, and sister; Other Cancer in her father.  SOCIAL HISTORY:  reports that she has never smoked. She has never used smokeless tobacco. She reports that she does not drink alcohol or use illicit drugs.    MEDICATIONS:  Current Outpatient Prescriptions   Medication Sig Dispense Refill     GABAPENTIN PO Take 100 mg by mouth 3 times daily       nystatin (MYCOSTATIN) 145076 UNIT/GM POWD Apply topically 2 times daily Apply to (R) side Abd. Folds topically three times a day for Rash(chronic redness)        OLANZAPINE PO Take 2.5 mg by mouth 2 times daily       DULOXETINE HCL PO Take 30 mg by mouth daily       gabapentin (NEURONTIN) 250 MG/5ML solution Take 50 mg by mouth every 2 hours as needed Not to exceed 6 as needed doses/24 hours        "cholecalciferol 2000 UNITS CAPS Take 1 capsule by mouth daily       donepezil (ARICEPT) 10 MG tablet Take 1 tablet (10 mg) by mouth At Bedtime 30 tablet 0     lisinopril (PRINIVIL,ZESTRIL) 10 MG tablet Take 1 tablet (10 mg) by mouth daily 30 tablet 0     aspirin 81 MG tablet Take 1 tablet (81 mg) by mouth daily 90 tablet 3     Multiple Vitamin (MULTIVITAMIN) per tablet Take 1 tablet by mouth daily. 90 tablet 3     Medications reviewed:  Medications reconciled to facility chart and changes were made to reflect current medications as identified as above med list. Below are the changes that were made:   Medications stopped since last EPIC medication reconciliation:   There are no discontinued medications.    Medications started since last [facility] medication reconciliation:  No orders of the defined types were placed in this encounter.        ROS:  Unobtainable secondary to cognitive impairment or aphasia, but today pt reports no concerns    Exam:  Vitals: /71  Pulse 55  Temp 98  F (36.7  C)  Resp 16  Ht 5' 6\" (1.676 m)  Wt 252 lb 4.8 oz (114.4 kg)  SpO2 100%  BMI 40.72 kg/m2  BMI= Body mass index is 40.72 kg/(m^2).  GENERAL APPEARANCE:  Alert, in no distress  RESP:  respiratory effort and palpation of chest normal, lungs clear to auscultation   CV:  Regular rate and rhythm.  PSYCH:  At this time, she mostly stared straight ahead. Minimal verbalization. She did appear calm, alert.     BP 07/30-09/17: /60-74mmHg    Lab/Diagnostic data:    CBC RESULTS:   Recent Labs   Lab Test  05/31/17   0535  05/13/13   1432   WBC  7.8  9.0   RBC  4.35  4.57   HGB  14.0  14.2   HCT  42.7  43.8   MCV  98  96   MCH  32.2  31.1   MCHC  32.8  32.4   RDW  13.6  13.6   PLT  170  190       Last Basic Metabolic Panel:  Recent Labs   Lab Test  05/31/17   0535  05/13/13   1432   NA  143  141   POTASSIUM  3.8  4.0   CHLORIDE  109  102   EVY  8.8  9.3   CO2  25  26   BUN  18  12   CR  0.72  0.92   GLC  77  89       TSH   Date Value " Ref Range Status   05/31/2017 3.89 0.40 - 4.00 mU/L Final   05/13/2013 2.43 0.4 - 5.0 mU/L Final       Lab Results   Component Value Date    A1C 5.5 05/31/2017    A1C 5.9 08/15/2011       ASSESSMENT/PLAN    Early onset Alzheimer's disease with behavioral disturbance> [facility]  No change in medication at this time.  Suspect gabapentin present to help with behaviours.  Do not anticipate stopping olanzapine due to behaviours.  At this time, continue Aricept as it may be helping to balance behaviour. Would also be concerned for more rapid decline if stopped.    Hypertension goal BP (blood pressure) < 130/80  Has been meeting goal.    Moderate episode of recurrent major depressive disorder (H)  Currently stable. On low dose duloxetine. No intent to increase at this time; suspect the other medications have been helping with this as well.     Morbid obesity, unspecified obesity type (H)  she is Vanessa transfer. Not coachable for lifestyle behaviours. Not burning calories.    History of diabetes mellitus, type II  Not on medication currently. Continue to follow. [id number] have been satisfactory           Electronically signed by:  Shantal Lopez MD

## 2017-09-28 PROBLEM — R56.9 SEIZURE (H): Status: ACTIVE | Noted: 2017-01-01

## 2017-09-28 NOTE — ED NOTES
Bed: ED02  Expected date: 9/28/17  Expected time: 7:44 AM  Means of arrival: Ambulance  Comments:  BV2  67F ?seizure

## 2017-09-28 NOTE — IP AVS SNAPSHOT
"` `           [place] [address] MEDICAL SURGICAL: [phone]                                              INTERAGENCY TRANSFER FORM - NURSING   2017                    Hospital Admission Date: 2017  VILMA MARTINEZ   : 1950  Sex: Female        Attending Provider: La Mcintyre DO     Allergies:  Penicillins    Infection:  None   Service:  HOSPITALIST    Ht:  1.753 m (5' 9\")   Wt:  118 kg (260 lb 2.3 oz)   Admission Wt:  118 kg (260 lb 2.3 oz)    BMI:  38.42 kg/m 2   BSA:  2.4 m 2            Patient PCP Information     Provider PCP Type    GABRIELE Brooks CNP General      Current Code Status     Date Active Code Status Order ID Comments User Context       Prior      Code Status History     Date Active Date Inactive Code Status Order ID Comments User Context    10/3/2017  8:38 AM  Full Code [id number]  Augusto Padilla MD Outpatient    2017  2:41 PM 10/3/2017  8:38 AM Full Code [id number]  Morris Gutierrez MD Inpatient    2017 12:20 PM 2017  2:41 PM Full Code [id number]  Zak, GABRIELE Patterson CNP Outpatient      Advance Directives        Does patient have a scanned Advance Directive/ACP document in EPIC?           Yes        Hospital Problems as of 10/3/2017              Priority Class Noted POA    Seizure (H) Medium  2017 Yes      Non-Hospital Problems as of 10/3/2017              Priority Class Noted    Hypertension goal BP (blood pressure) < 130/80 High  Unknown    Knee pain Medium  11/3/2008    Plantar fasciitis Medium  11/3/2008    Vitamin D deficiency Medium  2009    Advanced directives, counseling/discussion Medium  10/31/2011    Early onset Alzheimer's disease with behavioral disturbance> [facility] High  2017    Moderate episode of recurrent major depressive disorder (H) High  2017    Apraxia Medium  2017    Family history of malignant neoplasm of ovary> sister  Medium "  5/26/2017    Onychomycosis> toe nails Medium  5/26/2017    History of diabetes mellitus, type II Medium  5/26/2017    Routine general medical examination at a health care facility> done 5- Low  5/26/2017    Bariatric surgery status Medium  6/7/2017    Word finding difficulty High  7/7/2017    BMI 40.0-44.9, adult (H) Medium  8/4/2017      Immunizations     Name Date      Influenza (High Dose) 3 valent vaccine 10/03/17     Influenza (IIV3) 01/18/12     Pneumococcal (PCV 13) 06/02/17     Pneumococcal 23 valent 05/13/13     TDAP Vaccine (Boostrix) 06/23/11          END      ASSESSMENT     Discharge Profile Flowsheet     EXPECTED DISCHARGE     SKIN      Expected Discharge Date  10/03/17 ([facility]) 10/02/17 1008   Inspection of bony prominences  Full 10/03/17 0114    DISCHARGE NEEDS ASSESSMENT     Inspection under devices  Full 10/03/17 0114    # of Referrals Placed by CTS  Post Acute Facilities;Transportation 10/03/17 1328   Skin WDL  ex 10/03/17 1148    GASTROINTESTINAL (ADULT,PEDIATRIC,OB)     Skin Temperature  warm 10/03/17 0114    GI WDL  ex 10/03/17 1148   Skin Moisture  moist;dry 10/03/17 1148    Abdominal Appearance  obese 10/03/17 1148   Skin Integrity  other (see comments) ( abdominal fold redness and under breast redness) 10/03/17 0114    Last Bowel Movement  10/02/17 10/03/17 1148   Additional Documentation  Pressure Ulcer (LDA) 09/28/17 2316    GI Signs/Symptoms  fecal incontinence 10/03/17 1148   SAFETY      Passing flatus  yes 10/03/17 1148   Safety WDL  ex 10/03/17 1148    COMMUNICATION ASSESSMENT     Safety Factors  bed in low position 10/03/17 1148    Patient's communication style  spoken language (English or Bilingual) (minimal verbal expression) 09/28/17 1448   All Alarms  alarm(s) activated and audible 10/03/17 1148                 Assessment WDL (Within Defined Limits) Definitions           Safety WDL     Effective: 09/28/15    Row Information: <b>WDL Definition:</b> Bed in low position,  "wheels locked; call light in reach; upper side rails up x 2; ID band on<br> <font color=\"gray\"><i>Item=AS safety wdl>>List=AS safety wdl>>Version=F14</i></font>      Skin WDL     Effective: 09/28/15    Row Information: <b>WDL Definition:</b> Warm; dry; intact; elastic; without discoloration; pressure points without redness<br> <font color=\"gray\"><i>Item=AS skin wdl>>List=AS skin wdl>>Version=F14</i></font>      Vitals     Vital Signs Flowsheet     VITAL SIGNS     Pain Rating: FLACC (Activity) - Activity  0 10/03/17 0752    Temp  97.8  F (36.6  C) 10/03/17 0752   Pain Rating: FLACC (Activity) - Cry  0 10/03/17 0752    Temp src  Axillary 10/03/17 0752   Pain Rating: FLACC (Activity) - Consolability  0 10/03/17 0752    Resp  14 10/03/17 0752   Score: FLACC (activity)  0 10/03/17 0752    Pulse  64 09/29/17 0446   HEIGHT AND WEIGHT      Heart Rate  48 10/03/17 0752   Height  1.753 m (5' 9\") 10/02/17 2046    Pulse/Heart Rate Source  Monitor 10/03/17 0752   Height Method  Actual 10/02/17 2046    BP  117/63 10/03/17 0752   Weight  118 kg (260 lb 2.3 oz) 10/02/17 2046    BP Location  Left arm 10/03/17 0752   Weight Method  Wheelchair scale 10/02/17 2046    OXYGEN THERAPY     BSA (Calculated - sq m)  2.4 10/02/17 2046    SpO2  93 % 10/03/17 0752   BMI (Calculated)  38.5 10/02/17 2046    O2 Device  None (Room air) 10/03/17 0752   POSITIONING      Oxygen Delivery  1 LPM 10/02/17 0823   Body Position  turned 10/03/17 1148    PAIN/COMFORT     Head of Bed (HOB)  HOB at 15 degrees 10/03/17 0050    Patient Currently in Pain  LORIN 10/03/17 0752   Positioning/Transfer Devices  mechanical lift utilized 10/02/17 1703    Preferred Pain Scale  FLACC (Face Legs Arms Cry Consolability Scale) 09/28/17 1438   EKG MONITORING      PAIN ASSESSMENT/FLACC     Cardiac Rhythm  NSR 09/28/17 1105    Pain Rating: FLACC (rest) - Face  0 10/03/17 0752   DAILY CARE      Pain Rating: FLACC (rest) - Legs  0 10/03/17 0752   Activity Management  activity " adjusted per tolerance 10/03/17 1148    Pain Rating: FLACC (rest) - Activity  0 10/03/17 0752   Activity Assistance Provided  assistance, 2 people 10/03/17 1148    Pain Rating: FLACC (rest) - Cry  0 10/03/17 0752   Assistive Device Utilized  mechanical lift 10/03/17 1148    Pain Rating: FLACC (rest) - Consolability  0 10/03/17 0752   Additional Documentation  Activity Device Assistance (Row) 10/01/17 1909    Score: FLACC (rest)  0 10/03/17 0752   POINT OF CARE TESTING      Pain Rating: FLACC (Activity) - Face  0 10/03/17 0752   Puncture Site  fingertip 09/30/17 0914    Pain Rating: FLACC (Activity) - Legs  0 10/03/17 0752   Bedside Glucose (mg/dl )   146 mg/dl 09/30/17 0914            Patient Lines/Drains/Airways Status    Active LINES/DRAINS/AIRWAYS     Name: Placement date: Placement time: Site: Days: Last dressing change:    Pressure Injury 09/28/17 Right Hip R hip Redness 09/28/17 2000    4             Patient Lines/Drains/Airways Status    Active PICC/CVC     None            Intake/Output Detail Report     Date Intake     Output Net    Shift P.O. I.V. IV Piggyback Total Urine Total       Day 10/02/17 0700 - 10/02/17 1459 360 601 -- 961 -- -- 961    Jen 10/02/17 1500 - 10/02/17 2259 0 370 -- 370 -- -- 370    Noc 10/02/17 2300 - 10/03/17 0659 -- -- -- -- -- -- 0    Day 10/03/17 0700 - 10/03/17 1459 560 644 -- 1204 -- -- 1204    Jen 10/03/17 1500 - 10/03/17 2259 -- -- -- -- -- -- 0      Last Void/BM       Most Recent Value    Urine Occurrence 1 at 10/02/2017 1000    Stool Occurrence 1 at 10/02/2017 1000      Case Management/Discharge Planning     Case Management/Discharge Planning Flowsheet     REFERRAL INFORMATION     QUESTION TO PATIENT: Do you feel safe going back to the place where you are living?  patient declined to answer or is unable to answer 09/28/17 0759    # of Referrals Placed by CTS  Post Acute Facilities;Transportation 10/03/17 5819   OBSERVATION: Is there reason to believe there has been  maltreatment of a vulnerable adult (ie. Physical/Sexual/Emotional abuse, self neglect, lack of adequate food, shelter, medical care, or financial exploitation)?  no 09/28/17 0759    CTS Assigned to Quang Brewer RN  10/03/17 1328   (R) MENTAL HEALTH SUICIDE RISK      EXPECTED DISCHARGE     Are you depressed or being treated for depression?  Yes 09/28/17 1448    Expected Discharge Date  10/03/17 ([facility]) 10/02/17 1008   HOMICIDE RISK      ABUSE RISK SCREEN     Feels Like Hurting Others  other (see comments) (LORIN with pt's minimal verbal expression) 09/28/17 1449    QUESTION TO PATIENT:  Has a member of your family or a partner(now or in the past) intimidated, hurt, manipulated, or controlled you in any way?  patient declined to answer or is unable to answer 09/28/17 8236

## 2017-09-28 NOTE — IP AVS SNAPSHOT
` `     [place] [address] MEDICAL SURGICAL: [phone]                 INTERAGENCY TRANSFER FORM - NOTES (H&P, Discharge Summary, Consults, Procedures, Therapies)   2017                    Hospital Admission Date: 2017  ZAHIRA SUTHERLAND   : 1950  Sex: Female        Patient PCP Information     Provider PCP Type    Brianna Crespo, GABRIELE GARNER General         History & Physicals      H&P by Morris Gutierrez MD at 2017  1:33 PM     Author:  Morris Gutierrez MD Service:  Hospitalist Author Type:  Physician    Filed:  2017  1:57 PM Date of Service:  2017  1:33 PM Creation Time:  2017  1:33 PM    Status:  Signed :  Morris Gutierrez MD (Physician)         [facility]    History and Physical  Hospitalist       Date of Admission:  2017  Date of Service (when I saw the patient):[JY1.1] 17[JY1.2]    Assessment & Plan[JY1.3]   Zahira Sutherland is a 67 year old female with early onset advanced dementia (was told Alzhemiers) with physical deterioration requiring total cares (at [facility]), wheelchair bound, HTN who presents with witnessed/presumed seizure.    1. Neuro- New seizure. Already started on Keppra in the ED after consulting with Dr. Phoenix. Will formally consult Neurology. Will order EEG. Paraneoplastic labs sent from ED as per Neurology. CT neg in ED. I reviewed her home meds. It does not appear that she is on any meds that put her ask risk for seizure. Seizure precautions. Has advanced dementia (was told Alzheimers) with physical deterioration. Cont home meds. Family states patient does fine in the hospital. Will have VPN  2. Cardiovascular- HTN- on lisinopril  3. Pulmonary- was slightly hypoxic in ED. Likely due to post-ictal state. Now resolved  4. GI- regular diet  5. ID- no signs/symptoms of infection  6. Renal- no issues. Does not need IVF if tolerating PO  7. Heme/Onc-no issues  8. Endo- not diabetic  9. Musculoskeletal-  patient is wheelchair bound and needs total care  10. Prophylaxis- start if has prolonged stay. At her baseline mobility  11. Full code- reviewed with family  12. 2 daughters and 1 sister in room. All questions answered    Morris Gutierrez MD, Hospitalist  Pager: [phone]    Disposition: Expected discharge in likely 1 day[JY1.1]    Morris Gutierrez[JY1.3], MD[JY1.1]    Primary Care Physician   [facility]    Chief Complaint[JY1.3]   Seizure activity    History is obtained from the patient and patient's family and EMS notes. Sign out obtained from Dr. Taylor in the ED[JY1.1]    History of Present Illness[JY1.3]   Zahira Sutherland is a 67 year old female with early onset advanced dementia (was told Alzhemiers) with physical deterioration requiring total cares (at [facility]), wheelchair bound, HTN who presents with witnessed/presumed seizure. Patient unable to give history. Apparently she has been in her usual state of health. This am the nursing staff was walking by her room when they saw her bleeding from her mouth and less responsive. This lasted about 1 min, then she was more responsive. EMS was called. Vitals were stable. It was described as seizure activity. She also had a lac on her tongue from where she bit it. Family sees her daily. No recent complaints of chest pain, sob, abdo pain, n/v/d, fever or chills, uri symptoms. Family state she is a little quieter than usual but is at her baseline. She has never had a seizure.[JY1.1]     Past Medical History[JY1.3]    I have reviewed this patient's medical history and updated it with pertinent information if needed.[JY1.1]   Past Medical History:   Diagnosis Date     Dementia      Diabetes      Hypertension[JY1.4]        Past Surgical History[JY1.3]   I have reviewed this patient's surgical history and updated it with pertinent information if needed.[JY1.1]  Past Surgical History:   Procedure Laterality Date     BACK SURGERY      hernia repair in  her 38     CHOLECYSTECTOMY      in her 30s     GASTRIC BYPASS  1992     SURGICAL HISTORY OF -   1980s    back surgery- uncertain type[JY1.4]       Prior to Admission Medications   Prior to Admission Medications   Prescriptions Last Dose Informant Patient Reported? Taking?   DONEPEZIL HCL PO 9/27/2017 at Unknown time  Yes Yes   Sig: Take 10 mg by mouth daily   DULOXETINE HCL PO 9/27/2017 at Unknown time  Yes Yes   Sig: Take 30 mg by mouth daily   GABAPENTIN PO 9/28/2017 at 0600  Yes Yes   Sig: Take 100 mg by mouth 3 times daily   Multiple Vitamin (MULTIVITAMIN) per tablet 9/27/2017 at Unknown time  No Yes   Sig: Take 1 tablet by mouth daily.   OLANZAPINE PO 9/27/2017 at Unknown time  Yes Yes   Sig: Take 2.5 mg by mouth 2 times daily   aspirin 81 MG chewable tablet 9/27/2017 at Unknown time  Yes Yes   Sig: Take 81 mg by mouth daily   cholecalciferol 2000 UNITS CAPS 9/27/2017 at Unknown time  Yes Yes   Sig: Take 1 capsule by mouth daily   gabapentin (NEURONTIN) 250 MG/5ML solution   Yes Yes   Sig: Take 50 mg by mouth every 2 hours as needed Not to exceed 6 as needed doses/24 hours   lisinopril (PRINIVIL,ZESTRIL) 10 MG tablet 9/27/2017 at Unknown time  No Yes   Sig: Take 1 tablet (10 mg) by mouth daily   nystatin (MYCOSTATIN) 699416 UNIT/GM POWD 9/27/2017 at Unknown time  Yes Yes   Sig: Apply topically 2 times daily Twice daily to red areas.      Facility-Administered Medications: None     Allergies   Allergies   Allergen Reactions     Penicillins        Social History[JY1.3]   I have reviewed this patient's social history and updated it with pertinent information if needed. Zahira GORDO Sutherland[JY1.1]  reports that she has never smoked. She has never used smokeless tobacco. She reports that she does not drink alcohol or use illicit drugs.[JY1.4]    Family History[JY1.3]   I have reviewed this patient's family history and updated it with pertinent information if needed.[JY1.1]   Family History   Problem Relation Age of  Onset     Obesity Mother      CEREBROVASCULAR DISEASE Mother      Breast Cancer Mother      HEART DISEASE Mother      Obesity Father      Cancer - colorectal Father      DIABETES Father      Other Cancer Father      Obesity Sister      Obesity Brother      Hypertension Sister[JY1.4]        Review of Systems[JY1.3]   The 10 point Review of Systems is negative other than noted in the HPI or here.[JY1.1]     Physical Exam   Temp: 98.2  F (36.8  C) Temp src: Temporal BP: 103/69 Pulse: 93 Heart Rate: 82 Resp: 18 SpO2: 92 % O2 Device: None (Room air) (O2 @ 2 ltrs via N/C applied)[JY1.3]    Vital Signs with Ranges[JY1.1]  Temp:  [98.2  F (36.8  C)] 98.2  F (36.8  C)  Pulse:  [93] 93  Heart Rate:  [58-82] 82  Resp:  [18] 18  BP: ()/() 103/69  SpO2:  [79 %-99 %] 92 %  0 lbs 0 oz[JY1.3]    Exam:  GEN:  Alert,  appears comfortable, NAD. Non-verbal  HEENT:  Normocephalic/atraumatic, no scleral icterus, no nasal discharge, mouth moist.  CV:  Regular rate and rhythm, no murmur or JVD.  S1 + S2 noted, no S3 or S4.  LUNGS:  Clear to auscultation bilaterally without rales/rhonchi/wheezing/retractions.  Symmetric chest rise on inhalation noted.  ABD:  Active bowel sounds, soft, non-tender/non-distended.  No rebound/guarding/rigidity.  EXT:  No edema or cyanosis.  Hands/feet warm to touch with good signs of peripheral perfusion.  No joint synovitis noted.  SKIN:  Dry to touch, no exanthems noted in the visualized areas.  NEURO:  Symmetric muscle strength, sensation to touch grossly intact.  No new focal deficits appreciated.[JY1.1]    Data[JY1.3]   Data reviewed today:  I personally reviewed all imaging and EKGs[JY1.1]   Results for orders placed or performed during the hospital encounter of 09/28/17   CT Head w/o Contrast    Narrative    CT OF THE HEAD WITHOUT CONTRAST  9/28/2017 9:42 AM     COMPARISON: Head CT 7/25/2011.    HISTORY: Seizure.    TECHNIQUE: 5 mm thick axial CT images of the head were acquired  without IV  contrast material.    FINDINGS:  There is moderate diffuse cerebral volume loss. There are  subtle patchy areas of decreased density in the cerebral white matter  bilaterally that are consistent with sequela of chronic small vessel  ischemic disease.     The ventricles and basal cisterns are within normal limits in  configuration given the degree of cerebral volume loss.  There is no  midline shift. There are no extra-axial fluid collections.     No intracranial hemorrhage, mass or recent infarct.    The visualized paranasal sinuses are well-aerated. There is no  mastoiditis. There are no fractures of the visualized bones.  Thickening and irregularity of the inner table of the left parietal  skull again noted without change. This area of thickening and  irregularity is adjacent to bony nodule on the inner table that could  represent a calcified meningioma. Therefore the adjacent thickening  could represent bony reaction to the meningioma.      Impression    IMPRESSION:  Diffuse cerebral volume loss and cerebral white matter  changes consistent with chronic small vessel ischemic disease. No  evidence for acute intracranial pathology.  No evidence for acute  intracranial pathology. No change from the comparison study.    Radiation dose for this scan was reduced using automated exposure  control, adjustment of the mA and/or kV according to patient size, or  iterative reconstruction technique.    PATEL QUINTEROS MD   Chest XR,  PA & LAT    Narrative    CHEST TWO VIEWS 9/28/2017 9:44 AM     HISTORY: Hypoxia    COMPARISON: None.    FINDINGS: There are no acute infiltrates. The cardiac silhouette is  not enlarged. Pulmonary vasculature is unremarkable.      Impression    IMPRESSION: No acute disease.    KENDRA DUARTE MD         Recent Labs  Lab 09/28/17  0953 09/28/17  0810   PHV 7.36 7.20*   PO2V 32 39   PCO2V 53* 56*   HCO3V 30* 22       Recent Labs  Lab 09/28/17  0810   WBC 8.9   HGB 14.5   HCT 45.6   MCV 99   PLT  179[JY1.4]          Lab Results   Component Value Date     09/28/2017     05/31/2017     05/13/2013    Lab Results   Component Value Date    CHLORIDE 108 09/28/2017    CHLORIDE 109 05/31/2017    CHLORIDE 102 05/13/2013    Lab Results   Component Value Date    BUN 19 09/28/2017    BUN 18 05/31/2017    BUN 12 05/13/2013      Lab Results   Component Value Date    POTASSIUM 4.5 09/28/2017    POTASSIUM 3.8 05/31/2017    POTASSIUM 4.0 05/13/2013    Lab Results   Component Value Date    CO2 24 09/28/2017    CO2 25 05/31/2017    CO2 26 05/13/2013    Lab Results   Component Value Date    CR 0.85 09/28/2017    CR 0.72 05/31/2017    CR 0.92 05/13/2013[JY1.1]          Recent Labs  Lab 09/28/17  0855   COLOR Yady   APPEARANCE Slightly Cloudy   URINEGLC Negative   URINEBILI Negative   URINEKETONE Negative   SG 1.025   UBLD Negative   URINEPH 5.0   PROTEIN 100*   NITRITE Negative   LEUKEST Negative   RBCU 11*   WBCU 2[JY1.4]       Recent Results (from the past 24 hour(s))   CT Head w/o Contrast    Narrative    CT OF THE HEAD WITHOUT CONTRAST  9/28/2017 9:42 AM     COMPARISON: Head CT 7/25/2011.    HISTORY: Seizure.    TECHNIQUE: 5 mm thick axial CT images of the head were acquired  without IV contrast material.    FINDINGS:  There is moderate diffuse cerebral volume loss. There are  subtle patchy areas of decreased density in the cerebral white matter  bilaterally that are consistent with sequela of chronic small vessel  ischemic disease.     The ventricles and basal cisterns are within normal limits in  configuration given the degree of cerebral volume loss.  There is no  midline shift. There are no extra-axial fluid collections.     No intracranial hemorrhage, mass or recent infarct.    The visualized paranasal sinuses are well-aerated. There is no  mastoiditis. There are no fractures of the visualized bones.  Thickening and irregularity of the inner table of the left parietal  skull again noted without  change. This area of thickening and  irregularity is adjacent to bony nodule on the inner table that could  represent a calcified meningioma. Therefore the adjacent thickening  could represent bony reaction to the meningioma.      Impression    IMPRESSION:  Diffuse cerebral volume loss and cerebral white matter  changes consistent with chronic small vessel ischemic disease. No  evidence for acute intracranial pathology.  No evidence for acute  intracranial pathology. No change from the comparison study.    Radiation dose for this scan was reduced using automated exposure  control, adjustment of the mA and/or kV according to patient size, or  iterative reconstruction technique.    PATEL QUINTEROS MD   Chest XR,  PA & LAT    Narrative    CHEST TWO VIEWS 9/28/2017 9:44 AM     HISTORY: Hypoxia    COMPARISON: None.    FINDINGS: There are no acute infiltrates. The cardiac silhouette is  not enlarged. Pulmonary vasculature is unremarkable.      Impression    IMPRESSION: No acute disease.    KENDRA DUARTE MD[JY1.3]        Revision History        User Key Date/Time User Provider Type Action    > JY1.4 9/28/2017  1:57 PM Morris Gutierrez MD Physician Sign     JY1.2 9/28/2017  1:35 PM Morris Gutierrez MD Physician      JY1.3 9/28/2017  1:34 PM Morris Gutierrez MD Physician      JY1.1 9/28/2017  1:33 PM Morris Gutierrez MD Physician                      Discharge Summaries      Discharge Summaries by Augusto Padilla MD at 10/3/2017  1:09 PM     Author:  Augusto Padilla MD Service:  Hospitalist Author Type:  Physician    Filed:  10/3/2017  1:09 PM Date of Service:  10/3/2017  1:09 PM Creation Time:  10/3/2017  1:02 PM    Status:  Signed :  Augusto Padilla MD (Physician)         [facility]  Discharge Summary  Name: Zahira Sutherland    MRN: [id number]  YOB: 1950    Age: 67 year old  Date of Discharge:  10/3/2017  Date of Admission: 9/28/2017  Primary Care Provider:  Brianna Crespo  Discharge Physician:  Augusto Padilla MD  Discharging Service:  Hospitalist      Discharge Diagnosis:  1. 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 Hx of early onset Alzheimer's  3. Hx of DM (per medical chart) not on any medications  4. Hypertension  5. UTI with growing E coli     Other Diagnosis:[AG1.1]  Past Medical History:   Diagnosis Date     Dementia      Diabetes      Hypertension[AG1.2]           Discharge Disposition:  Discharged to home     Allergies:  Allergies   Allergen Reactions     Penicillins         Discharge Medications:   Current Discharge Medication List      START taking these medications    Details   levETIRAcetam 1000 MG TABS Take 1,000 mg by mouth 2 times daily  Qty: 60 tablet, Refills: 0    Associated Diagnoses: Grand mal seizure (H)      ciprofloxacin (CIPRO) 500 MG tablet Take 1 tablet (500 mg) by mouth 2 times daily for 5 days  Qty: 10 tablet, Refills: 0    Associated Diagnoses: Dysuria         CONTINUE these medications which have NOT CHANGED    Details   DONEPEZIL HCL PO Take 10 mg by mouth daily      aspirin 81 MG chewable tablet Take 81 mg by mouth daily      GABAPENTIN PO Take 100 mg by mouth 3 times daily      nystatin (MYCOSTATIN) 428487 UNIT/GM POWD Apply topically 2 times daily Twice daily to red areas.      OLANZAPINE PO Take 2.5 mg by mouth 2 times daily      DULOXETINE HCL PO Take 30 mg by mouth daily      cholecalciferol 2000 UNITS CAPS Take 1 capsule by mouth daily      Multiple Vitamin (MULTIVITAMIN) per tablet Take 1 tablet by mouth daily.  Qty: 90 tablet, Refills: 3    Associated Diagnoses: Hypertension goal BP (blood pressure) < 130/80         STOP taking these medications       lisinopril (PRINIVIL,ZESTRIL) 10 MG tablet Comments:   Reason for Stopping:  BP levels were soft even with out the ACEi on board               Condition on Discharge:  Discharge condition: Stable   Discharge vitals: Blood pressure 117/63, pulse 64,  "temperature 97.8  F (36.6  C), temperature source Axillary, resp. rate 14, height 1.753 m (5' 9\"), weight 118 kg (260 lb 2.3 oz), SpO2 93 %.   Code status on discharge: Full Code     History of Present Illness:  See detailed admission note for full details.        Significant Physical Exam Findings Day of Discharge:  HEENT; Atraumatic, normocephalic, pinkish conjuctiva, pupils bilateral reactive , facial redness noted yesterday has resolved  Skin: warm and moist, no rashes  Lungs: equal chest expansion, clear to auscultation, no wheezes, no stridor, no crackles,   Heart: normal rate, normal rhythm, no rubs or gallops.   Abdomen: normal bowel sounds, no tenderness, no peritoneal signs, no guarding  Extremities: no deformities, no edema   Neuro; follow commands, alert and oriented x3, spontaneous speech, coherent, moves all extremities spontaneously  Psych; no hallucination, euthymic mood, not agitated    Procedures other than Imaging:  none     Imaging:[AG1.1]  No results found for this or any previous visit (from the past 48 hour(s)).[AG1.3]     Consultations:  Consultation during this admission received from neurology.     Recent Lab Results:    Recent Labs  Lab 09/28/17  0810   WBC 8.9   HGB 14.5   HCT 45.6   MCV 99          Recent Labs  Lab 10/01/17  0520   CULT >100,000 colonies/mLEscherichia coli*       Recent Labs  Lab 09/29/17  0858 09/28/17  0819 09/28/17  0810     --  141   POTASSIUM 3.8  --  4.5   CHLORIDE 109  --  108   CO2 29  --  24   ANIONGAP 4  --  9   GLC 88  --  123*   BUN 15  --  19   CR 0.80  --  0.85   GFRESTIMATED 71 62 66   GFRESTBLACK 86 75 80   EVY 8.4*  --  9.2   MAG 2.3  --   --    PROTTOTAL 6.2*  --   --    ALBUMIN 2.8*  --   --    BILITOTAL 0.8  --   --    ALKPHOS 86  --   --    AST 13  --   --    ALT 24  --   --        Recent Labs  Lab 10/03/17  1006 10/03/17  0803 10/03/17  0631 10/02/17  2022 10/01/17  2008  09/29/17  0858  09/28/17  0810   GLC  --   --   --   --   --   -- "  88  --  123*   BGM 88 74 88 130* 100*  < >  --   < >  --    < > = values in this interval not displayed.    Recent Labs  Lab 09/28/17  0953   LACT 2.0       Recent Labs  Lab 09/28/17  0819   TROPONIN 0.00       Recent Labs  Lab 10/01/17  0520   COLOR Yellow   APPEARANCE Slightly Cloudy   URINEGLC Negative   URINEBILI Negative   URINEKETONE Negative   SG 1.010   UBLD Negative   URINEPH 6.0   PROTEIN Negative   NITRITE Negative   LEUKEST Moderate*   RBCU 2   WBCU 31*          Pending Results:    Unresulted Labs Ordered in the Past 30 Days of this Admission     Date and Time Order Name Status Description    9/28/2017 1024 Paraneoplastic Antibodies with Reflex In process            Discharge Instructions and Follow-Up:   Discharge diet:   Active Diet Order      Regular Diet Adult      Diet   Discharge activity: Activity as tolerated   Discharge follow-up: 1-2 weeks with PCP, with neurology as scheduled with pending paraneoplastic panel   Outpatient therapy: None    Other instructions: None      Hospital Course:  Summary of Stay: Zahira Sutherland is a 67 year old female with known advance dementia (evaluated at [facility] with early onset Alzheimer's) admitted on 9/28/2017 with reported episodes of seizures     Problem List:   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 Earlier EEG showed seizure activity and suspected with immune mediated seizures and did well with 3 doses of medrol IV 500mg. Blood glucose showed normal/optimal levels.  Currently she is at her baseline. She is tolerating her oral diet. No reported fever, diarrhea, abdominal pain, vomiting.   Continued on oral antibiotics for her UTI with isolated E coli  Resume with prior donepezil, cymbalta, neurontin and zyrexa  Please secure [facility] records as well. Request sent but did not receive any records  Her R  maxillary and left submandibular area appears erythematous earlier but resolved and no signs of infection. Remained afebrile.     Total time spent in face to face contact with the patient and coordinating discharge was:  > 30 Minutes.[AG1.1]       Revision History        User Key Date/Time User Provider Type Action    > AG1.3 10/3/2017  1:09 PM Augusto Padilla MD Physician Sign     AG1.2 10/3/2017  1:05 PM Augusto Padilla MD Physician      AG1.1 10/3/2017  1:02 PM Augusto Padilla MD Physician                      Consult Notes      Consults by Gambucci, Gerladine, RN at 9/29/2017 10:54 AM     Author:  Gambucci, Gerladine, RN Service:  (none) Author Type:      Filed:  9/29/2017 10:54 AM Date of Service:  9/29/2017 10:54 AM Creation Time:  9/29/2017 10:44 AM    Status:  Signed :  Gambucci, Gerladine, RN ()     Consult Orders:    1. Social Work IP Consult [[id number]] ordered by La Mcintyre DO at 09/29/17 0828                Care Transition Initial Assessment - SW     Met with: spoke with Caregiver at [facility]     Active Problems:    Seizure (H)         DATA               . Pt lives at [facility]  Identified issues/concerns regarding health management:   PIPPA spoke with [facility] nurse on the 2nd floor [phone] where the pt resides. Nurse states pt usually feeds herself and is not a morning person, they usually don't wake her up until at least 10am. She at baseline is able to feed herself and is not much of a water drinker. The nurse states that she does have some behavorial issues and she will try to strike at staff. They use a Vanessa lift with her. There is no issues with her returning back to the facility over the weekend.                   Please fax discharge orders to [phone] [facility].    ASSESSMENT  Cognitive Status:  Not verbal  Concerns to be addressed: seizure activity .       PLAN  Financial costs for the patient includes none .  Patient  anticipates discharging to:  Back to [facility] .    Jo Morley RN, BSN CTS  Care Coordinator  [phone][GG1.1]                 Revision History        User Key Date/Time User Provider Type Action    > GG1.1 2017 10:54 AM Gambucci, Gerladine, RN Case Manager Sign            Consults by Robel Phoenix MD at 2017  4:57 PM     Author:  Robel Phoenix MD Service:  Neurology Author Type:  Physician    Filed:  2017  4:57 PM Date of Service:  2017  4:57 PM Creation Time:  2017  4:47 PM    Status:  Signed :  Robel Phoenix MD (Physician)     Consult Orders:    1. Neurology IP Consult: Patient to be seen: Routine - within 24 hours; new seizure; Consultant may enter orders: Yes [[id number]] ordered by Morris Gutierrez MD at 17 1308                Neurology Consult Note  [facility] Neurology, [facility].       [2017]                                                                                       Admission Date: 2017  Hospital Day: 1      Patient: Zahira Sutherland      : 1950  MRN:  [id number]     CC:[RA1.1]      Chief Complaint   Patient presents with     Seizures[RA1.2]       Consult Request:  Referring Provider:  Morris Gutierrez MD  Primary Care Provider:  Brianna Crespo MD        HPI:  Zahira Sutherland is a 67 year old yo female admitted for unwitnessed seizure. She was noted to have bleeding in her mouth with less responsiveness. She was brought to [facility] for change in her mental status from her baseline. She has advanced Alzheimer's with minimal baseline functioning. No history of seizures in the past.          A complete review of symptoms was performed including vascular, infectious, cardiovascular, pulmonary, gastrointestinal, endocrinological, hematologic, dermatologic, musculoskeletal, and neurological. All were normal except as above.    PAST MEDICAL HISTORY:  ALLERGIES:[RA1.1]   Allergies   Allergen  Reactions     Penicillins[RA1.2]      Tobacco:[RA1.1]    History   Smoking Status     Never Smoker   Smokeless Tobacco     Never Used[RA1.2]     Alcohol:[RA1.1]    Alcohol use No   Comment: Once in a great while[RA1.2]     MEDICATIONS:       CURRENTLY SCHEDULED MEDICATIONS[RA1.1]     sodium chloride (PF)  3 mL Intracatheter Q8H     aspirin  81 mg Oral Daily     donepezil (ARICEPT) tablet 10 mg  10 mg Oral Daily     DULoxetine (CYMBALTA) EC capsule 30 mg  30 mg Oral Daily     gabapentin (NEURONTIN) capsule 100 mg  100 mg Oral TID     lisinopril  10 mg Oral Daily     OLANZapine (zyPREXA) tablet 2.5 mg  2.5 mg Oral BID     levETIRAcetam  500 mg Oral BID[RA1.2]          HOME MEDICATIONS[RA1.1]  Prescriptions Prior to Admission   Medication Sig Dispense Refill Last Dose     DONEPEZIL HCL PO Take 10 mg by mouth daily   9/27/2017 at Unknown time     aspirin 81 MG chewable tablet Take 81 mg by mouth daily   9/27/2017 at Unknown time     GABAPENTIN PO Take 100 mg by mouth 3 times daily   9/28/2017 at 0600     nystatin (MYCOSTATIN) 170439 UNIT/GM POWD Apply topically 2 times daily Twice daily to red areas.   9/27/2017 at Unknown time     OLANZAPINE PO Take 2.5 mg by mouth 2 times daily   9/27/2017 at Unknown time     DULOXETINE HCL PO Take 30 mg by mouth daily   9/27/2017 at Unknown time     gabapentin (NEURONTIN) 250 MG/5ML solution Take 50 mg by mouth every 2 hours as needed Not to exceed 6 as needed doses/24 hours   Taking     cholecalciferol 2000 UNITS CAPS Take 1 capsule by mouth daily   9/27/2017 at Unknown time     lisinopril (PRINIVIL,ZESTRIL) 10 MG tablet Take 1 tablet (10 mg) by mouth daily 30 tablet 0 9/27/2017 at Unknown time     Multiple Vitamin (MULTIVITAMIN) per tablet Take 1 tablet by mouth daily. 90 tablet 3 9/27/2017 at Unknown time[RA1.2]     MEDICAL HISTORY[RA1.1]  Past Medical History:   Diagnosis Date     Dementia      Diabetes      Hypertension[RA1.2]      SURGICAL HISTORY[RA1.1]  Past Surgical History:  "  Procedure Laterality Date     BACK SURGERY      hernia repair in her 38     CHOLECYSTECTOMY      in her 30s     GASTRIC BYPASS       SURGICAL HISTORY OF -       back surgery- uncertain type[RA1.2]     FAMILY HISTORY[RA1.1]    Family History   Problem Relation Age of Onset     Obesity Mother      CEREBROVASCULAR DISEASE Mother      Breast Cancer Mother      HEART DISEASE Mother      Obesity Father      Cancer - colorectal Father      DIABETES Father      Other Cancer Father      Obesity Sister      Obesity Brother      Hypertension Sister[RA1.2]      SOCIAL HISTORY  Social History     Social History     Marital status:      Spouse name: N/A     Number of children: 2     Years of education: N/A     Occupational History      None      Social History Main Topics     Smoking status: Never Smoker     Smokeless tobacco: Never Used     Alcohol use No      Comment: Once in a great while     Drug use: No     Sexual activity: No     Other Topics Concern     Parent/Sibling W/ Cabg, Mi Or Angioplasty Before 65f 55m? No      Service No     Seat Belt Yes     Social History Narrative        [RA1.1]          Temp: 97.7  F (36.5  C)        Temp src: Axillary         BP: 91/58   Heart Rate: 80     Estimated body mass index is 40.72 kg/(m^2) as calculated from the following:    Height as of 17: 1.676 m (5' 6\").    Weight as of 17: 114.4 kg (252 lb 4.8 oz).    Resp: 12   SpO2: 93 %   O2 Device: None (Room air)[RA1.2]     Blood Pressure:[RA1.1]   BP Readings from Last 3 Encounters:   17 91/58   17 105/71   09/15/17 99/60[RA1.2]     T24 : Temp (24hrs), Av  F (36.7  C), Min:97.7  F (36.5  C), Max:98.2  F (36.8  C)       GENERAL EXAMINATION:  HEENT: PERRLA, EOMI.  Neck: Supple, No carotid bruits. No myofascial tender points.  Chest: Bilateral air entry present.  Heart: S1, S2 RRR.    NEUROLOGICAL EXAMINATION  Mental Status:  Patient is awake, alert, but not very responsive to commands.She " could speak only one or 2 words. She did not follow any commands.    Cranial Nerves: Pupils are equal and reactive to light. Extraocular movements are intact. There is no nystagmus in the horizontal or vertical planes.No facial sensory deficits. No facial weakness. The tongue is midline.     Motor: Muscle tone is normal. There is no pronator drift. There is no muscle atrophy. She could not follow the instructions for strength exam. Deep tendon reflexes are 2+ and symmetric in his biceps, triceps, knees, and ankles. Plantars are up going/ withdrawal bilaterally.    Sensory: Could not be done.    Coordination: could not be done    Gait: could not be done.    .  LABORATORY RESULTS[RA1.1]        Recent Labs  Lab 09/28/17  0810   WBC 8.9   HGB 14.5   HCT 45.6   MCV 99          Recent Labs  Lab 09/28/17  0819 09/28/17  0810   NA  --  141   POTASSIUM  --  4.5   CHLORIDE  --  108   CO2  --  24   ANIONGAP  --  9   GLC  --  123*   BUN  --  19   CR  --  0.85   GFRESTIMATED 62 66   GFRESTBLACK 75 80   EVY  --  9.2       Recent Labs  Lab 09/28/17  0855   COLOR Yady   APPEARANCE Slightly Cloudy   URINEGLC Negative   URINEBILI Negative   URINEKETONE Negative   SG 1.025   UBLD Negative   URINEPH 5.0   PROTEIN 100*   NITRITE Negative   LEUKEST Negative   RBCU 11*   WBCU 2[RA1.3]       IMAGING RESULTS[RA1.1]     Recent Results (from the past 24 hour(s))   CT Head w/o Contrast    Narrative    CT OF THE HEAD WITHOUT CONTRAST  9/28/2017 9:42 AM     COMPARISON: Head CT 7/25/2011.    HISTORY: Seizure.    TECHNIQUE: 5 mm thick axial CT images of the head were acquired  without IV contrast material.    FINDINGS:  There is moderate diffuse cerebral volume loss. There are  subtle patchy areas of decreased density in the cerebral white matter  bilaterally that are consistent with sequela of chronic small vessel  ischemic disease.     The ventricles and basal cisterns are within normal limits in  configuration given the degree of  cerebral volume loss.  There is no  midline shift. There are no extra-axial fluid collections.     No intracranial hemorrhage, mass or recent infarct.    The visualized paranasal sinuses are well-aerated. There is no  mastoiditis. There are no fractures of the visualized bones.  Thickening and irregularity of the inner table of the left parietal  skull again noted without change. This area of thickening and  irregularity is adjacent to bony nodule on the inner table that could  represent a calcified meningioma. Therefore the adjacent thickening  could represent bony reaction to the meningioma.      Impression    IMPRESSION:  Diffuse cerebral volume loss and cerebral white matter  changes consistent with chronic small vessel ischemic disease. No  evidence for acute intracranial pathology.  No evidence for acute  intracranial pathology. No change from the comparison study.    Radiation dose for this scan was reduced using automated exposure  control, adjustment of the mA and/or kV according to patient size, or  iterative reconstruction technique.    PATEL QUINTEROS MD   Chest XR,  PA & LAT    Narrative    CHEST TWO VIEWS 9/28/2017 9:44 AM     HISTORY: Hypoxia    COMPARISON: None.    FINDINGS: There are no acute infiltrates. The cardiac silhouette is  not enlarged. Pulmonary vasculature is unremarkable.      Impression    IMPRESSION: No acute disease.    KENDRA DUARTE MD[RA1.2]       _____________________________________________________________________________          ASSESSMENT     1. Unwitnessed suspected seizure        RECOMMENDATIONS   1. Continue keppra.  2. Paraneoplastic antibody panel to evaluate antibody related epilepsy  3. EEG[RA1.1]              Revision History        User Key Date/Time User Provider Type Action    > RA1.3 9/28/2017  4:57 PM Robel Phoenix MD Physician Sign     RA1.2 9/28/2017  4:48 PM Robel Phoenix MD Physician      RA1.1 9/28/2017  4:47 PM Robel Phoenix MD Physician                       Progress Notes - Physician (Notes from 09/30/17 through 10/03/17)      Progress Notes by Juli Ram RN at 10/3/2017  1:20 PM     Author:  Juli Ram RN Service:  (none) Author Type:      Filed:  10/3/2017  1:37 PM Date of Service:  10/3/2017  1:20 PM Creation Time:  10/3/2017  1:20 PM    Status:  Addendum :  Juli Ram RN ()         Pt is ready to dc back to [facility].  Dtg Rony was updated on Dc planning.  She is agreeable that I set up WC transport.[RB1.1]  She is aware of the private cost.  HE WC[RB1.2] is at 1700.[RB1.3]   She is aware she needs to schedule f/u with Dr Phoenix within 2 weeks after dc.[RB1.1]  [facility]  [facility]RB1.4]  Daniella CORONA CTS [phone][RB1.3]     Revision History        User Key Date/Time User Provider Type Action    > RB1.4 10/3/2017  1:37 PM Juli Ram RN Case Manager Addend     RB1.3 10/3/2017  1:27 PM Juli Ram RN Case Manager Sign     RB1.2 10/3/2017  1:23 PM Juli Ram RN Case Manager      RB1.1 10/3/2017  1:20 PM Juli Ram RN Case Manager             Progress Notes by Robel Phoenix MD at 10/2/2017  5:39 PM     Author:  Robel Phoenix MD Service:  Neurology Author Type:  Physician    Filed:  10/2/2017  5:44 PM Date of Service:  10/2/2017  5:39 PM Creation Time:  10/2/2017  5:39 PM    Status:  Signed :  Robel Phoenix MD (Physician)         [facility]  Neurology Progress Note               Interval History:   Patient is alert and follows some commands.  She was noted to have UTI, culture positive for E. Coli.              Medications:   I have reviewed this patient's current medications               Physical Exam:   Blood pressure 103/54, pulse 64, temperature 97.7  F (36.5  C), temperature source Axillary, resp. rate 16, SpO2 94 %.  Awake, alert. Follows simple commands.  Not oriented to time or place.  Less movement in  right arm and leg as compared to left.         Data:   Recent labs, imaging, and other studies were reviewed.         Lab Results   Component Value Date     09/29/2017    Lab Results   Component Value Date    CHLORIDE 109 09/29/2017    Lab Results   Component Value Date    BUN 15 09/29/2017      Lab Results   Component Value Date    POTASSIUM 3.8 09/29/2017    Lab Results   Component Value Date    CO2 29 09/29/2017    Lab Results   Component Value Date    CR 0.80 09/29/2017        Lab Results   Component Value Date    WBC 8.9 09/28/2017    HGB 14.5 09/28/2017    HCT 45.6 09/28/2017    MCV 99 09/28/2017     09/28/2017     No results found for: INR         Assessment and Plan:   Advanced dementia, UTI and recent seizure  Paraneoplastic panel pending.  IV solumedrol completed.   Manage UTI.  Continue keppra for seizures.  Follow up in clinic in 2 weeks.[RA1.1]        Revision History        User Key Date/Time User Provider Type Action    > RA1.1 10/2/2017  5:44 PM Robel Phoenix MD Physician Sign            Progress Notes by Danielle Min SLP at 10/2/2017  3:06 PM     Author:  Danielle Min SLP Service:  (none) Author Type:  Speech Therapist    Filed:  10/2/2017  3:06 PM Date of Service:  10/2/2017  3:06 PM Creation Time:  10/2/2017  3:06 PM    Status:  Signed :  Danielle Min SLP (Speech Therapist)         CLINICAL SWALLOW EVALUATION       10/02/17 1400   General Information   Onset Date 09/28/17   Start of Care Date 10/02/17   Referring Physician Dr. Padilla   Patient Profile Review/OT: Additional Occupational Profile Info See Profile for full history and prior level of function   Swallowing Evaluation Bedside swallow evaluation   Behaviorial Observations Confused   Mode of current nutrition Oral diet   Type of oral diet Thin liquid;Regular   Respiratory Status Room air   Comments PT admitted 9/28/17 with seizures.  PMH is significant for early onset Alzheimer's.     Clinical Swallow  Evaluation   Oral Musculature unable to assess due to poor participation/comprehension   Structural Abnormalities none present   Dentition present and adequate   Mucosal Quality good   Oral Musculature Comments Unable to assess lip and tongue function.    Additional Documentation Yes   Additional evaluation(s) completed today No   Clinical Swallow Eval: Thin Liquid Texture Trial   Mode of Presentation, Thin Liquids cup;straw;self-fed;fed by clinician   Volume of Liquid or Food Presented 4 oz   Oral Phase of Swallow Poor AP movement   Pharyngeal Phase of Swallow intact   Diagnostic Statement Mild to moderate delay in swallow initiation   Clinical Swallow Eval: Puree Solid Texture Trial   Mode of Presentation, Puree spoon;self-fed   Volume of Puree Presented 3 oz   Oral Phase, Puree WFL   Pharyngeal Phase, Puree intact   Diagnostic Statement No overt Sx of aspiration   Clinical Swallow Eval: Solid Food Texture Trial   Mode of Presentation, Solid self-fed   Volume of Solid Food Presented 3 crackers   Oral Phase, Solid WFL   Pharyngeal Phase, Solid intact   Diagnostic Statement Functional oral clearance   Swallow Compensations   Swallow Compensations Pacing;Reduce amounts  (Feeding assistance)   Swallow Eval: Clinical Impressions   Skilled Criteria for Therapy Intervention Skilled criteria met.  Treatment indicated.   Functional Assessment Scale (FAS) 5   Dysphagia Outcome Severity Scale (COURT) Level 5 - COURT   Treatment Diagnosis Mild oropharyngeal dysphagia   Diet texture recommendations Regular diet;Thin liquids   Recommended Feeding/Eating Techniques maintain upright posture during/after eating for 30 mins;small sips/bites;other (see comments)  (1:1 feeding assistance)   Demonstrates Need for Referral to Another Service occupational therapy;physical therapy   Therapy Frequency 5 times/wk   Predicted Duration of Therapy Intervention (days/wks) 1 week   Anticipated Discharge Disposition extended care facility   Risks  and Benefits of Treatment have been explained. Yes   Patient, family and/or staff in agreement with Plan of Care Yes   Clinical Impression Comments PT presents with mild oropharyngeal dysphagia characterized by delayed swallow initiation and risk of premature pharyngeal entry, but without overt Sx of aspiration for thin, puree, and solid PO trials this afternoon when alert and upright.  PT required 1:1 assistance for feeding, small sips via cup and straw with guidance from feeder.  Recommend SLP follow up to ensure diet tolerance across a meal.     Total Evaluation Time   Total Evaluation Time (Minutes) 25[RL1.1]        Revision History        User Key Date/Time User Provider Type Action    > RL1.1 10/2/2017  3:06 PM Danielle Min, SLP Speech Therapist Sign            Progress Notes by Shantal Stout RN at 10/2/2017  8:35 AM     Author:  Shantal Stout RN Service:  [facility] Nurse Author Type:  Registered Nurse    Filed:  10/2/2017 11:38 AM Date of Service:  10/2/2017  8:35 AM Creation Time:  10/2/2017  8:35 AM    Status:  Signed :  Shantal Stout RN (Registered Nurse)         [facility] Nurse Inpatient Adult Pressure Injury (PI) Wound Assessment     Assessment of PI(s) on pt's:   Right Hip    Data:   Patient History:      per MD note(s):  67 year old female with known advance dementia (evaluated at [facility] with early onset Alzheimer's) admitted on 2017 with reported episodes of seizures     Current mattress:[MA1.1]  Pulsate[MA1.2]  Current pressure relieving devices:[MA1.1]  Pillows[MA1.2]        Current Diet / Nutrition:[MA1.1]           Active Diet Order      Regular Diet Adult[MA1.3]    Herman Assessment and sub scores:   Herman Score  Av.6  Min: 14  Max: 15   Labs:   Recent Labs   Lab Test  17   0858  17   0810  17   0535   ALBUMIN  2.8*   --    --    HGB   --   14.5  14.0   WBC   --   8.9  7.8   A1C   --    --   5.5[MA1.1]                                                                                                                           Skin[MA1.2] Injury Assessment  (location):[MA1.1]   Abdomen/ axilla/ breast skin folds[MA1.2]    Wound History:[MA1.1]   See above[MA1.2]    Specific Dimensions (length x width x depth, in cm) :[MA1.1]   Very superficial erosion due to moisture to deep skin folds, pink dermis[MA1.2]    Periwound Skin:[MA1.1] intact[MA1.2],[MA1.1]  Superficial fungal satellite lesions noted[MA1.2]    Color:[MA1.1] normal and consistent with surrounding tissue[MA1.2]    Temperature[MA1.1]  normal[MA1.2]     Drainage:   Amount:[MA1.1] scant[MA1.2],  Color:[MA1.1] serous[MA1.2]       Odor:[MA1.1] none[MA1.2]    Pain:[MA1.1]  unable to assess[MA1.2] ,[MA1.1] due to dementia, pt did not flinch or yell when examined[MA1.2]         Intervention:     Patient's chart evaluated.      Herman Interventions:  Current Herman Interventions and Care Plan reviewed and updated, appropriate at this time.    Wound[MA1.1] was assessed[MA1.2].    Wound Care:[MA1.1] was[MA1.2] done:[MA1.1] Visual inspection    Application of topical critic aid paste to open areas,[MA1.2]    Orders[MA1.1]  Written[MA1.2]    Supplies[MA1.1]  Placed at Bedside[MA1.2]    Discussed plan of care with[MA1.1] Patient, Nurse and Physician[MA1.2]           Assessment:     Pressure Injury[MA1.1] noted on admission to right hip not assessed[MA1.2]      Status: wound[MA1.1]  initial assessment to skin folds, erosion of epidermis with fungal rash[MA1.2],[MA1.1] Stable; Asymptomatic[MA1.2]         Plan:     Nursing to notify the Provider(s) and re-consult the [facility] Nurse if wound(s) deteriorate(s)or if the wound care plan needs reevaluation.    Plan for wound care to wound located on[MA1.1] skin folds[MA1.2]:[MA1.1] BID/ PRN    1. Cleanse with Steven and soft cloth to wipe and wash,    2. Lightly dust with Antifungal powder, wipe into skin and dust off excess    3. Apply light layer of Critic aid paste to open  skin[MA1.2]    WO Nurse will return:[MA1.1] completed; stable, please reconsult if new open issues occur[MA1.2]  Face to face time:[MA1.1] 25 min[MA1.2]       Revision History        User Key Date/Time User Provider Type Action    > MA1.2 10/2/2017 11:38 AM Shantal Stout, RN Registered Nurse Sign     MA1.3 10/2/2017  8:36 AM Shantal Stout RN Registered Nurse      MA1.1 10/2/2017  8:35 AM Shantal Stout, RN Registered Nurse             Progress Notes by Augusto Padilla MD at 10/2/2017  9:19 AM     Author:  Augusto Padilla MD Service:  Hospitalist Author Type:  Physician    Filed:  10/2/2017  9:24 AM Date of Service:  10/2/2017  9:19 AM Creation Time:  10/2/2017  9:19 AM    Status:  Signed :  Augusto Padilla MD (Physician)         [facility]  Hospitalist Progress Note[AG1.1]  Augusto Padilla MD, MD 10/02/2017[AG1.2]  (Text Page)  Reason for Stay (Diagnosis): seizure events         Assessment and Plan:      Summary of Stay: Zahira Sutherland is a 67 year old female with known advance dementia (evaluated at [place] with early onset Alzheimer's) admitted on 9/28/2017 with reported episodes of seizures    Problem List:   1. 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 Hx of early onset Alzheimer's  3. Hx of DM (per medical chart) not on any medications  4. Hypertension  5. UTI with growing E coli    Continue inpatient care. Appreciate neuro input.  Started on IV medrol with 500 mg daily for 3 doses for possible immune mediated seizures with pending paraneoplastic panel.  Will await further instructions/recommendations from neurology regarding seizures management  Monitor glucose levels as anticipating increase levels with IV medrol on board. Finish medrol dosing  Resume with prior donepezil, cymbalta, neurontin and zyrexa  Please secure [place] records as well.  Her R maxillary and left submandibular area appears erythematous,  but no tenderness. Remained afebrile. Change  cipro to rocephin    DVT Prophylaxis: Pneumatic Compression Devices  Code Status: Full Code  Discharge Dispo: back to LTC  Estimated Disch Date / # of Days until Disch: likely in 24-36 hours        Interval History (Subjective):      Continuing care today.  Seen and examined  Zahira is awake, redirectable, appears comfortable and has no ongoing complaints.                  Physical Exam:      Last Vital Signs:[AG1.1]  /54 (BP Location: Left arm)  Pulse 64  Temp 96.8  F (36  C) (Oral)  Resp 16  SpO2 92%    I/O last 3 completed shifts:  In: 2963 [P.O.:1540; I.V.:1423]  Out: -   Wt Readings from Last 1 Encounters:   09/19/17 114.4 kg (252 lb 4.8 oz)     There were no vitals filed for this visit.[AG1.2]    Constitutional: Awake, alert,  no apparent distress   Respiratory: Clear to auscultation bilaterally, no crackles or wheezing   Cardiovascular: Regular rate and rhythm, normal S1 and S2, and no murmur noted   Abdomen: Normal bowel sounds, soft, non-distended, non-tender   Skin: Erythema seen on the R maxillary area, L submandibular area, no warmth, non tender   Neuro: Alert, , no weakness, spontaneous and coherent speech, not following simple verbal instructions   Extremities: No edema, normal range of motion   Other(s): Euthymic mood, not agitated       All other systems: Negative          Medications:      All current medications were reviewed with changes reflected in problem list.         Data:      All new lab and imaging data was reviewed.   Labs:[AG1.1]    Recent Labs  Lab 10/01/17  0520   CULT >100,000 colonies/mLEscherichia coliSusceptibility testing in progress*       Recent Labs  Lab 09/28/17  0810   WBC 8.9   HGB 14.5   HCT 45.6   MCV 99          Recent Labs  Lab 09/29/17  0858 09/28/17  0819 09/28/17  0810     --  141   POTASSIUM 3.8  --  4.5   CHLORIDE 109  --  108   CO2 29  --  24   ANIONGAP 4  --  9   GLC 88  --  123*   BUN 15  --  19   CR 0.80  --  0.85   GFRESTIMATED 71 62 66    GFRESTBLACK 86 75 80   EVY 8.4*  --  9.2   MAG 2.3  --   --    PROTTOTAL 6.2*  --   --    ALBUMIN 2.8*  --   --    BILITOTAL 0.8  --   --    ALKPHOS 86  --   --    AST 13  --   --    ALT 24  --   --        Recent Labs  Lab 10/01/17  2008 09/30/17  1905 09/30/17  0913 09/30/17  0300 09/29/17  0858 09/29/17  0642  09/28/17  0810   GLC  --   --   --   --  88  --   --  123*   * 119* 146* 155*  --  93  < >  --    < > = values in this interval not displayed.  No results for input(s): INR in the last 168 hours.    Recent Labs  Lab 10/01/17  0520   COLOR Yellow   APPEARANCE Slightly Cloudy   URINEGLC Negative   URINEBILI Negative   URINEKETONE Negative   SG 1.010   UBLD Negative   URINEPH 6.0   PROTEIN Negative   NITRITE Negative   LEUKEST Moderate*   RBCU 2   WBCU 31*[AG1.2]      Imaging:   Results for orders placed or performed during the hospital encounter of 09/28/17   CT Head w/o Contrast    Narrative    CT OF THE HEAD WITHOUT CONTRAST  9/28/2017 9:42 AM     COMPARISON: Head CT 7/25/2011.    HISTORY: Seizure.    TECHNIQUE: 5 mm thick axial CT images of the head were acquired  without IV contrast material.    FINDINGS:  There is moderate diffuse cerebral volume loss. There are  subtle patchy areas of decreased density in the cerebral white matter  bilaterally that are consistent with sequela of chronic small vessel  ischemic disease.     The ventricles and basal cisterns are within normal limits in  configuration given the degree of cerebral volume loss.  There is no  midline shift. There are no extra-axial fluid collections.     No intracranial hemorrhage, mass or recent infarct.    The visualized paranasal sinuses are well-aerated. There is no  mastoiditis. There are no fractures of the visualized bones.  Thickening and irregularity of the inner table of the left parietal  skull again noted without change. This area of thickening and  irregularity is adjacent to bony nodule on the inner table that  could  represent a calcified meningioma. Therefore the adjacent thickening  could represent bony reaction to the meningioma.      Impression    IMPRESSION:  Diffuse cerebral volume loss and cerebral white matter  changes consistent with chronic small vessel ischemic disease. No  evidence for acute intracranial pathology.  No evidence for acute  intracranial pathology. No change from the comparison study.    Radiation dose for this scan was reduced using automated exposure  control, adjustment of the mA and/or kV according to patient size, or  iterative reconstruction technique.    PATEL QUINTEROS MD   Chest XR,  PA & LAT    Narrative    CHEST TWO VIEWS 9/28/2017 9:44 AM     HISTORY: Hypoxia    COMPARISON: None.    FINDINGS: There are no acute infiltrates. The cardiac silhouette is  not enlarged. Pulmonary vasculature is unremarkable.      Impression    IMPRESSION: No acute disease.    KENDRA DUARTE MD[AG1.1]            Revision History        User Key Date/Time User Provider Type Action    > AG1.2 10/2/2017  9:24 AM Augusto Padilla MD Physician Sign     AG1.1 10/2/2017  9:19 AM Augusto Padilla MD Physician             Progress Notes by Augusto Padilla MD at 10/1/2017 11:22 AM     Author:  Augusto Padilla MD Service:  Hospitalist Author Type:  Physician    Filed:  10/1/2017 11:24 AM Date of Service:  10/1/2017 11:22 AM Creation Time:  10/1/2017 11:22 AM    Status:  Signed :  Augusto Padilla MD (Physician)         [facility]  Hospitalist Progress Note  Augusto Padilla MD, MD 10/01/2017  (Text Page)  Reason for Stay (Diagnosis): seizure events         Assessment and Plan:      Summary of Stay: Zahira Sutherland is a 67 year old female with known advance dementia (evaluated at [facility] with early onset Alzheimer's) admitted on 9/28/2017 with reported episodes of seizures    Problem List:   1. 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 Hx of early onset  Alzheimer's  3. Hx of DM (per medical chart) not on any medications  4. Hypertension    Continue inpatient care. Appreciate neuro input.  Started on IV medrol with 500 mg daily for 3 doses for possible immune mediated seizures with pending paraneoplastic panel.  Monitor glucose levels as anticipating increase levels with IV medrol on board.  Resume with prior donepezil, cymbalta, neurontin and zyrexa  Please secure [place] records as well.    DVT Prophylaxis: Pneumatic Compression Devices  Code Status: Full Code  Discharge Dispo: back to [facility]  Estimated Disch Date / # of Days until Disch: likely in 24-36 hours        Interval History (Subjective):      Continuing care today.  Seen and examined  Zahira is awake, tolerated oral diet with nursing assistance earlier.  No reported ongoing issues, no seizure like activity.  Remained at baseline mental state and afebrile.                    Physical Exam:      Last Vital Signs:  /59  Pulse 64  Temp 96.3  F (35.7  C) (Oral)  Resp 16  SpO2 92%    I/O last 3 completed shifts:  In: 480 [P.O.:480]  Out: 400 [Urine:400]  Wt Readings from Last 1 Encounters:   09/19/17 114.4 kg (252 lb 4.8 oz)     There were no vitals filed for this visit.    Constitutional: Awake, alert,  no apparent distress   Respiratory: Clear to auscultation bilaterally, no crackles or wheezing   Cardiovascular: Regular rate and rhythm, normal S1 and S2, and no murmur noted   Abdomen: Normal bowel sounds, soft, non-distended, non-tender   Skin: No rashes, no cyanosis, dry to touch   Neuro: Alert, , no weakness, spontaneous and coherent speech, not following simple verbal instructions   Extremities: No edema, normal range of motion   Other(s): Euthymic mood, not agitated       All other systems: Negative          Medications:      All current medications were reviewed with changes reflected in problem list.         Data:      All new lab and imaging data was reviewed.   Labs:    Recent Labs  Lab  10/01/17  0520   CULT PENDING       Recent Labs  Lab 09/28/17  0810   WBC 8.9   HGB 14.5   HCT 45.6   MCV 99          Recent Labs  Lab 09/29/17  0858 09/28/17  0819 09/28/17  0810     --  141   POTASSIUM 3.8  --  4.5   CHLORIDE 109  --  108   CO2 29  --  24   ANIONGAP 4  --  9   GLC 88  --  123*   BUN 15  --  19   CR 0.80  --  0.85   GFRESTIMATED 71 62 66   GFRESTBLACK 86 75 80   EVY 8.4*  --  9.2   MAG 2.3  --   --    PROTTOTAL 6.2*  --   --    ALBUMIN 2.8*  --   --    BILITOTAL 0.8  --   --    ALKPHOS 86  --   --    AST 13  --   --    ALT 24  --   --        Recent Labs  Lab 09/30/17  1905 09/30/17  0913 09/30/17  0300 09/29/17  0858 09/29/17  0642 09/29/17  0530 09/28/17  0810   GLC  --   --   --  88  --   --  123*   * 146* 155*  --  93 79  --      No results for input(s): INR in the last 168 hours.    Recent Labs  Lab 10/01/17  0520   COLOR Yellow   APPEARANCE Slightly Cloudy   URINEGLC Negative   URINEBILI Negative   URINEKETONE Negative   SG 1.010   UBLD Negative   URINEPH 6.0   PROTEIN Negative   NITRITE Negative   LEUKEST Moderate*   RBCU 2   WBCU 31*      Imaging:   Results for orders placed or performed during the hospital encounter of 09/28/17   CT Head w/o Contrast    Narrative    CT OF THE HEAD WITHOUT CONTRAST  9/28/2017 9:42 AM     COMPARISON: Head CT 7/25/2011.    HISTORY: Seizure.    TECHNIQUE: 5 mm thick axial CT images of the head were acquired  without IV contrast material.    FINDINGS:  There is moderate diffuse cerebral volume loss. There are  subtle patchy areas of decreased density in the cerebral white matter  bilaterally that are consistent with sequela of chronic small vessel  ischemic disease.     The ventricles and basal cisterns are within normal limits in  configuration given the degree of cerebral volume loss.  There is no  midline shift. There are no extra-axial fluid collections.     No intracranial hemorrhage, mass or recent infarct.    The visualized paranasal  sinuses are well-aerated. There is no  mastoiditis. There are no fractures of the visualized bones.  Thickening and irregularity of the inner table of the left parietal  skull again noted without change. This area of thickening and  irregularity is adjacent to bony nodule on the inner table that could  represent a calcified meningioma. Therefore the adjacent thickening  could represent bony reaction to the meningioma.      Impression    IMPRESSION:  Diffuse cerebral volume loss and cerebral white matter  changes consistent with chronic small vessel ischemic disease. No  evidence for acute intracranial pathology.  No evidence for acute  intracranial pathology. No change from the comparison study.    Radiation dose for this scan was reduced using automated exposure  control, adjustment of the mA and/or kV according to patient size, or  iterative reconstruction technique.    PATEL QUINTEROS MD   Chest XR,  PA & LAT    Narrative    CHEST TWO VIEWS 9/28/2017 9:44 AM     HISTORY: Hypoxia    COMPARISON: None.    FINDINGS: There are no acute infiltrates. The cardiac silhouette is  not enlarged. Pulmonary vasculature is unremarkable.      Impression    IMPRESSION: No acute disease.    KENDRA DUARTE MD[AG1.1]            Revision History        User Key Date/Time User Provider Type Action    > AG1.1 10/1/2017 11:24 AM Augusto Padilla MD Physician Sign            Progress Notes by Augusto Padilla MD at 9/30/2017 10:56 AM     Author:  Augusto Padilla MD Service:  Hospitalist Author Type:  Physician    Filed:  9/30/2017 11:05 AM Date of Service:  9/30/2017 10:56 AM Creation Time:  9/30/2017 10:56 AM    Status:  Signed :  Augusto Padilla MD (Physician)         [facility]  Hospitalist Progress Note[AG1.1]  Augusto Padilla MD, MD 09/30/2017[AG1.2]  (Text Page)  Reason for Stay (Diagnosis): seizure events         Assessment and Plan:      Summary of Stay: Zahira Sutherland is a 67 year  old female with known advance dementia (evaluated at [place] with early onset Alzheimer's) admitted on 9/28/2017 with reported episodes of seizures    Problem List:   1. 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 Hx of early onset Alzheimer's  3. Hx of DM (per medical chart) not on any medications  4. Hypertension    Continue inpatient care. Appreciate neuro input.  Started on IV medrol with 500 mg daily for 3 doses for possible immune mediated seizures with pending paraneoplastic panel.  Monitor glucose levels as anticipating increase levels with IV medrol on board.  Resume with prior donepezil, cymbalta, neurontin and zyrexa  Please secure [place] records as well.    DVT Prophylaxis: Pneumatic Compression Devices  Code Status: Full Code  Discharge Dispo: back to LTC  Estimated Disch Date / # of Days until Disch: > 2 days        Interval History (Subjective):      Assumed care today.  Seen and examined  Zahira is awake, tolerating oral diet with baseline assistance with her daughter  No episodes of obvious seizure shakes.  No vomiting.   Mental state appears at baseline.  afebrile                  Physical Exam:      Last Vital Signs:[AG1.1]  /58 (BP Location: Left arm)  Pulse 64  Temp 97.7  F (36.5  C) (Oral)  Resp 18  SpO2 91%[AG1.2]    I/O last 3 completed shifts:  In: 814 [I.V.:814]  Out: -   Wt Readings from Last 1 Encounters:   09/19/17 114.4 kg (252 lb 4.8 oz)     There were no vitals filed for this visit.[AG1.3]    Constitutional: Awake, alert,  no apparent distress   Respiratory: Clear to auscultation bilaterally, no crackles or wheezing   Cardiovascular: Regular rate and rhythm, normal S1 and S2, and no murmur noted   Abdomen: Normal bowel sounds, soft, non-distended, non-tender   Skin: No rashes, no cyanosis, dry to touch   Neuro: Alert, , no weakness, spontaneous and coherent speech   Extremities: No edema, normal range of motion   Other(s): Euthymic mood, not agitated       All other  systems: Negative          Medications:      All current medications were reviewed with changes reflected in problem list.         Data:      All new lab and imaging data was reviewed.   Labs:[AG1.1]  No results for input(s): CULT in the last 168 hours.    Recent Labs  Lab 09/28/17  0810   WBC 8.9   HGB 14.5   HCT 45.6   MCV 99          Recent Labs  Lab 09/29/17  0858 09/28/17  0819 09/28/17  0810     --  141   POTASSIUM 3.8  --  4.5   CHLORIDE 109  --  108   CO2 29  --  24   ANIONGAP 4  --  9   GLC 88  --  123*   BUN 15  --  19   CR 0.80  --  0.85   GFRESTIMATED 71 62 66   GFRESTBLACK 86 75 80   EVY 8.4*  --  9.2   MAG 2.3  --   --    PROTTOTAL 6.2*  --   --    ALBUMIN 2.8*  --   --    BILITOTAL 0.8  --   --    ALKPHOS 86  --   --    AST 13  --   --    ALT 24  --   --        Recent Labs  Lab 09/30/17  0913 09/30/17  0300 09/29/17  0858 09/29/17  0642 09/29/17  0530 09/28/17  0810   GLC  --   --  88  --   --  123*   * 155*  --  93 79  --      No results for input(s): INR in the last 168 hours.    Recent Labs  Lab 09/28/17  0855   COLOR Yady   APPEARANCE Slightly Cloudy   URINEGLC Negative   URINEBILI Negative   URINEKETONE Negative   SG 1.025   UBLD Negative   URINEPH 5.0   PROTEIN 100*   NITRITE Negative   LEUKEST Negative   RBCU 11*   WBCU 2[AG1.3]      Imaging:[AG1.1]   Results for orders placed or performed during the hospital encounter of 09/28/17   CT Head w/o Contrast    Narrative    CT OF THE HEAD WITHOUT CONTRAST  9/28/2017 9:42 AM     COMPARISON: Head CT 7/25/2011.    HISTORY: Seizure.    TECHNIQUE: 5 mm thick axial CT images of the head were acquired  without IV contrast material.    FINDINGS:  There is moderate diffuse cerebral volume loss. There are  subtle patchy areas of decreased density in the cerebral white matter  bilaterally that are consistent with sequela of chronic small vessel  ischemic disease.     The ventricles and basal cisterns are within normal limits  in  configuration given the degree of cerebral volume loss.  There is no  midline shift. There are no extra-axial fluid collections.     No intracranial hemorrhage, mass or recent infarct.    The visualized paranasal sinuses are well-aerated. There is no  mastoiditis. There are no fractures of the visualized bones.  Thickening and irregularity of the inner table of the left parietal  skull again noted without change. This area of thickening and  irregularity is adjacent to bony nodule on the inner table that could  represent a calcified meningioma. Therefore the adjacent thickening  could represent bony reaction to the meningioma.      Impression    IMPRESSION:  Diffuse cerebral volume loss and cerebral white matter  changes consistent with chronic small vessel ischemic disease. No  evidence for acute intracranial pathology.  No evidence for acute  intracranial pathology. No change from the comparison study.    Radiation dose for this scan was reduced using automated exposure  control, adjustment of the mA and/or kV according to patient size, or  iterative reconstruction technique.    PATEL QUINTEROS MD   Chest XR,  PA & LAT    Narrative    CHEST TWO VIEWS 9/28/2017 9:44 AM     HISTORY: Hypoxia    COMPARISON: None.    FINDINGS: There are no acute infiltrates. The cardiac silhouette is  not enlarged. Pulmonary vasculature is unremarkable.      Impression    IMPRESSION: No acute disease.    KENDRA DUARTE MD[AG1.3]            Revision History        User Key Date/Time User Provider Type Action    > AG1.3 9/30/2017 11:05 AM Augusto Padilla MD Physician Sign     AG1.2 9/30/2017 10:58 AM Augusto Padilla MD Physician      AG1.1 9/30/2017 10:56 AM Augusto Padilla MD Physician                      Procedure Notes      Procedures signed by Robel Phoenix MD at 10/2/2017  3:29 PM      Author:  Robel Phoenix MD Service:  Neurology Author Type:  Physician    Filed:  10/2/2017  3:29 PM Date of Service:   9/29/2017  7:01 PM Creation Time:  10/2/2017 10:17 AM    Status:  Signed :  Amanda Phoenix MD (Physician)     Procedure Orders:    1. EEG [[id number]] ordered by Amanda Phoenix MD at 10/02/17 1017                ELECTROENCEPHALOGRAM   ORDERING PHYSICIAN: Dr. Morris Gutierrez  EEG#   Location:     Test Type: portable EEG   CONDITION OF PATIENT: Patient awake, very restless with frequent eye opening, moving feet and hands and mumbling. Patient unable to follow command to close eyes and other commands. Unable to perform the HV or photic protocol due to patient inability to perform task. Patient needed frequent redirecting. RN was unaware of handedness.   REASON FOR TEST: Staff at [facility] witnessed abnormal responsiveness and bleeding from mouth. EMS was called and was reported to that patient had seizure like activity.   HISTORY: dementia, diabetes and hypertension   MEDICATIONS: aspirin, Aricept, Cymbalta, Neurontin, Keppra, Zyprexa, Zofran   CONCLUSION:  This is an 18-channel routine EEG with one channel of EKG.  The patient is mumbling and not able to follow any commands.  The photic stimulation and hyperventilation were not performed during this recording.   The EEG shows diffuse background slowing in the 6-7 Hz range.  The EEG shows frontal intermittent rhythmic delta activity.  There are episodes of intermittent sharp transients in the anterior leads bilaterally.  The episodes of a 1 Hz spike and wave discharges lasting 5 to 10 seconds with no associated clinical activity.  The EKG showed normal sinus rhythm of 64 beats per minute.   IMPRESSION: This is an abnormal electroencephalographic study showing diffuse slowing with intermittent electrical seizure activity.  There are significant interictal changes during this recording.  The patient may have underlying generalized tonic-clonic seizures.  Clinical correlation is recommended.         AMANDA PHOENIX MD             D: 09/29/2017  19:01   T: 10/02/2017 10:17   MT: rh      Name:     VILMA MARTINEZ   MRN:      [id number]        Account:        [id number]   :      1950           Procedure Date: 2017      Document: [id number]       cc: Brianna Crespo APRN CNP   [RA1.1]   Revision History        User Key Date/Time User Provider Type Action    > RA1.1 10/2/2017  3:29 PM Robel Phoenix MD Physician Sign                     Progress Notes - Therapies (Notes from 17 through 10/03/17)      Progress Notes by Danielle Min SLP at 10/2/2017  3:06 PM     Author:  Danielle Min SLP Service:  (none) Author Type:  Speech Therapist    Filed:  10/2/2017  3:06 PM Date of Service:  10/2/2017  3:06 PM Creation Time:  10/2/2017  3:06 PM    Status:  Signed :  Danielle Min SLP (Speech Therapist)         CLINICAL SWALLOW EVALUATION       10/02/17 1400   General Information   Onset Date 17   Start of Care Date 10/02/17   Referring Physician Dr. Padilla   Patient Profile Review/OT: Additional Occupational Profile Info See Profile for full history and prior level of function   Swallowing Evaluation Bedside swallow evaluation   Behaviorial Observations Confused   Mode of current nutrition Oral diet   Type of oral diet Thin liquid;Regular   Respiratory Status Room air   Comments PT admitted 17 with seizures.  PMH is significant for early onset Alzheimer's.     Clinical Swallow Evaluation   Oral Musculature unable to assess due to poor participation/comprehension   Structural Abnormalities none present   Dentition present and adequate   Mucosal Quality good   Oral Musculature Comments Unable to assess lip and tongue function.    Additional Documentation Yes   Additional evaluation(s) completed today No   Clinical Swallow Eval: Thin Liquid Texture Trial   Mode of Presentation, Thin Liquids cup;straw;self-fed;fed by clinician   Volume of Liquid or Food Presented 4 oz   Oral Phase of Swallow Poor AP movement    Pharyngeal Phase of Swallow intact   Diagnostic Statement Mild to moderate delay in swallow initiation   Clinical Swallow Eval: Puree Solid Texture Trial   Mode of Presentation, Puree spoon;self-fed   Volume of Puree Presented 3 oz   Oral Phase, Puree WFL   Pharyngeal Phase, Puree intact   Diagnostic Statement No overt Sx of aspiration   Clinical Swallow Eval: Solid Food Texture Trial   Mode of Presentation, Solid self-fed   Volume of Solid Food Presented 3 crackers   Oral Phase, Solid WFL   Pharyngeal Phase, Solid intact   Diagnostic Statement Functional oral clearance   Swallow Compensations   Swallow Compensations Pacing;Reduce amounts  (Feeding assistance)   Swallow Eval: Clinical Impressions   Skilled Criteria for Therapy Intervention Skilled criteria met.  Treatment indicated.   Functional Assessment Scale (FAS) 5   Dysphagia Outcome Severity Scale (COURT) Level 5 - COURT   Treatment Diagnosis Mild oropharyngeal dysphagia   Diet texture recommendations Regular diet;Thin liquids   Recommended Feeding/Eating Techniques maintain upright posture during/after eating for 30 mins;small sips/bites;other (see comments)  (1:1 feeding assistance)   Demonstrates Need for Referral to Another Service occupational therapy;physical therapy   Therapy Frequency 5 times/wk   Predicted Duration of Therapy Intervention (days/wks) 1 week   Anticipated Discharge Disposition extended care facility   Risks and Benefits of Treatment have been explained. Yes   Patient, family and/or staff in agreement with Plan of Care Yes   Clinical Impression Comments PT presents with mild oropharyngeal dysphagia characterized by delayed swallow initiation and risk of premature pharyngeal entry, but without overt Sx of aspiration for thin, puree, and solid PO trials this afternoon when alert and upright.  PT required 1:1 assistance for feeding, small sips via cup and straw with guidance from feeder.  Recommend SLP follow up to ensure diet tolerance  across a meal.     Total Evaluation Time   Total Evaluation Time (Minutes) 25[RL1.1]        Revision History        User Key Date/Time User Provider Type Action    > RL1.1 10/2/2017  3:06 PM Danielle Min, SLP Speech Therapist Sign

## 2017-09-28 NOTE — ED NOTES
"[facility]  ED Nurse Handoff Report    Zahira Sutherland is a 67 year old female   ED Chief complaint: Seizures  . ED Diagnosis:   Final diagnoses:   Grand mal seizure (H)   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's disease with early onset     Allergies:   Allergies   Allergen Reactions     Penicillins        Code Status: Full Code  Activity level - Baseline/Home:  Total Care. Activity Level - Current:   Total Care. Lift room needed: Yes. Bariatric: No   Needed: No   Isolation: No. Infection: Not Applicable.     Vital Signs:   Vitals:    09/28/17 1000 09/28/17 1015 09/28/17 1030 09/28/17 1045   BP: 119/71 118/71 118/67 114/79   Pulse:       Resp:       Temp:       TempSrc:       SpO2: 99% 92% 90% 92%       Cardiac Rhythm:  ,   Cardiac  Cardiac Rhythm: Normal sinus rhythm  Pain level:    Patient confused: Yes. Patient Falls Risk: Yes.   Elimination Status: incont.   Patient Report - Initial Complaint: ?seizure. Focused Assessment:    Cardiac Monitoring - EKG Monitoring: Yes Cardiac Rhythm: NSR TD    08:02 Musculoskeletal Musculoskeletal - Musculoskeletal WDL:  WDL except (upper extremity \"twitching\" noted)      Cognitive/Perceptual/Neuro - Cognitive/Neuro/Behavioral WDL: level of consciousness; mood/behavior; speech Level Of Consciousness: other (see comments) (unable to follow verbal commands) Best Language: 2 - Severe aphasia Mood/Behavior: flat affect  Seizure Episode - Seizure Activity: reported; other (see comments) (postictal per staff at [facility]-pt awake but not responding to commands)  Tests Performed: lab/CT. Abnormal Results: .  Labs Ordered and Resulted from Time of ED Arrival Up to the Time of Departure from the ED   BASIC METABOLIC PANEL - Abnormal; Notable for the following:        Result Value    Glucose 123 (*)     All other components within normal limits   BLOOD GAS VENOUS - Abnormal; Notable for the following:     Ph Venous 7.20 (*)     PCO2 Venous 56 (*)     All other components " within normal limits   ROUTINE UA WITH MICROSCOPIC - Abnormal; Notable for the following:     Protein Albumin Urine 100 (*)     Urobilinogen mg/dL 4.0 (*)     RBC Urine 11 (*)     Bacteria Urine Few (*)     Mucous Urine Present (*)     All other components within normal limits   ISTAT  GASES LACTATE CARI POCT - Abnormal; Notable for the following:     PCO2 Venous 53 (*)     Bicarbonate Venous 30 (*)     All other components within normal limits   CBC WITH PLATELETS DIFFERENTIAL   CK TOTAL   CREATININE POCT   PARANEOPLASTIC ANTIBODIES WITH REFLEX   PULSE OXIMETRY NURSING   PERIPHERAL IV CATHETER   ISTAT TROPONIN NURSING POCT   ISTAT CREATININE NURSING POCT   ISTAT CG4 GASES LACTATE CARI NURSING POCT   TROPONIN POCT     .   Treatments provided: see mar  Family Comments: sister present and supportive  OBS brochure/video discussed/provided to patient:  Yes  ED Medications:   Medications   lidocaine 1 % 1 mL (not administered)   lidocaine (LMX4) kit (not administered)   sodium chloride (PF) 0.9% PF flush 3 mL (not administered)   sodium chloride (PF) 0.9% PF flush 3 mL (not administered)   ondansetron (ZOFRAN) injection 4 mg (4 mg Intravenous Given 9/28/17 0836)   levETIRAcetam (KEPPRA) 1,000 mg in NaCl 0.9 % 100 mL intermittent infusion (0 mg Intravenous Stopped 9/28/17 1050)     Drips infusing:  No  For the majority of the shift, the patient's behavior Green. Interventions performed were see MAR.     Severe Sepsis OR Septic Shock Diagnosis Present: No      ED Nurse Name/Phone Number: Ella Merritt,   11:06 AM    RECEIVING UNIT ED HANDOFF REVIEW    Above ED Nurse Handoff Report was reviewed: Yes  Reviewed by: Deja Montalvo on September 28, 2017 at 2:27 PM

## 2017-09-28 NOTE — IP AVS SNAPSHOT
"    [place] [address] MEDICAL SURGICAL: [phone]                                              INTERAGENCY TRANSFER FORM - PHYSICIAN ORDERS   2017                    Hospital Admission Date: 2017  VILMA MARTINEZ   : 1950  Sex: Female        Attending Provider: La Mcintyre DO     Allergies:  Penicillins    Infection:  None   Service:  HOSPITALIST    Ht:  1.753 m (5' 9\")   Wt:  118 kg (260 lb 2.3 oz)   Admission Wt:  118 kg (260 lb 2.3 oz)    BMI:  38.42 kg/m 2   BSA:  2.4 m 2            Patient PCP Information     Provider PCP Type    GABRIELE [facility]      ED Clinical Impression     Diagnosis Description Comment Added By Time Added    Grand mal seizure (H) [G40.409] Grand mal seizure (H) [G40.409]  Donnie Taylor MD 2017  8:40 AM    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's disease with early onset [G30.0, F02.80] 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's disease with early onset [G30.0, F02.80]  Donnie Taylor MD 2017  8:41 AM      Hospital Problems as of 10/3/2017              Priority Class Noted POA    Seizure (H) Medium  2017 Yes      Non-Hospital Problems as of 10/3/2017              Priority Class Noted    Hypertension goal BP (blood pressure) < 130/80 High  Unknown    Knee pain Medium  11/3/2008    Plantar fasciitis Medium  11/3/2008    Vitamin D deficiency Medium  2009    Advanced directives, counseling/discussion Medium  10/31/2011    Early onset Alzheimer's disease with behavioral disturbance> was [facility] 2016 High  2017    Moderate episode of recurrent major depressive disorder (H) High  2017    Apraxia Medium  2017    Family history of malignant neoplasm of ovary> sister  Medium  2017    Onychomycosis> toe nails Medium  2017    History of diabetes mellitus, type II Medium  2017    Routine general medical examination at a health care facility> done 2017 Low  2017    Bariatric surgery " status Medium  6/7/2017    Word finding difficulty High  7/7/2017    BMI 40.0-44.9, adult (H) Medium  8/4/2017      Code Status History     Date Active Date Inactive Code Status Order ID Comments User Context    10/3/2017  8:38 AM  Full Code [id number]  Augusto Padilla MD Outpatient    9/28/2017  2:41 PM 10/3/2017  8:38 AM Full Code [id number]  Morris Gutierrez MD Inpatient    5/26/2017 12:20 PM 9/28/2017  2:41 PM Full Code [id number]  Brianna Crespo APRN CNP Outpatient         Medication Review      START taking        Dose / Directions Comments    ciprofloxacin 500 MG tablet   Commonly known as:  CIPRO   Used for:  Dysuria        Dose:  500 mg   Take 1 tablet (500 mg) by mouth 2 times daily for 5 days   Quantity:  10 tablet   Refills:  0        levETIRAcetam 1000 MG Tabs   Used for:  Grand mal seizure (H)        Dose:  1000 mg   Take 1,000 mg by mouth 2 times daily   Quantity:  60 tablet   Refills:  0          CONTINUE these medications which may have CHANGED, or have new prescriptions. If we are uncertain of the size of tablets/capsules you have at home, strength may be listed as something that might have changed.        Dose / Directions Comments    GABAPENTIN PO   This may have changed:  Another medication with the same name was removed. Continue taking this medication, and follow the directions you see here.        Dose:  100 mg   Take 100 mg by mouth 3 times daily   Refills:  0          CONTINUE these medications which have NOT CHANGED        Dose / Directions Comments    aspirin 81 MG chewable tablet        Dose:  81 mg   Take 81 mg by mouth daily   Refills:  0        cholecalciferol 2000 UNITS Caps        Dose:  1 capsule   Take 1 capsule by mouth daily   Refills:  0        DONEPEZIL HCL PO        Dose:  10 mg   Take 10 mg by mouth daily   Refills:  0        DULOXETINE HCL PO        Dose:  30 mg   Take 30 mg by mouth daily   Refills:  0        multivitamin per tablet   Used for:   Hypertension goal BP (blood pressure) < 130/80        Dose:  1 tablet   Take 1 tablet by mouth daily.   Quantity:  90 tablet   Refills:  3        nystatin 216440 UNIT/GM Powd   Commonly known as:  MYCOSTATIN        Apply topically 2 times daily Twice daily to red areas.   Refills:  0        OLANZAPINE PO        Dose:  2.5 mg   Take 2.5 mg by mouth 2 times daily   Refills:  0          STOP taking     lisinopril 10 MG tablet   Commonly known as:  PRINIVIL/ZESTRIL                     Further instructions from your care team       [facility] -Follow up within 2 weeks.  [facility] [address], [address]  [place] [address]  DIRECTIONS  Neurology and Neurodiagnostic Testing: [phone]  Office Fax: [phone]  Rehabilitation: [phone]  Rehabilitation Fax: [phone]      Summary of Visit     Reason for your hospital stay       Admitted for seizure episodes             After Care     Activity       Your activity upon discharge: activity as tolerated       Diet       Follow this diet upon discharge: Orders Placed This Encounter      Snacks/Supplements Adult: Ensure Clear; Between Meals      Room Service      Regular Diet Adult             Follow-Up Appointment Instructions     Future Labs/Procedures    Follow-up and recommended labs and tests      Comments:    Follow up with primary care provider, Brianna Crespo, within 7 days to evaluate medication change, to evaluate treatment change and for hospital follow- up.  No follow up labs or test are needed.  Follow up with neurology as scheduled with pending para neoplastic panel for recent seizures      Follow-Up Appointment Instructions     Follow-up and recommended labs and tests        Follow up with primary care provider, Brianna Crespo, within 7 days to evaluate medication change, to evaluate treatment change and for hospital follow- up.  No follow up  labs or test are needed.  Follow up with neurology as scheduled with pending para neoplastic panel for recent seizures             Statement of Approval     Ordered          10/03/17 1301  I have reviewed and agree with all the recommendations and orders detailed in this document.  EFFECTIVE NOW     Approved and electronically signed by:  Augusto Padilla MD

## 2017-09-28 NOTE — ED NOTES
"Pt arrives via EMS for evaluation after staff noticed she was breathing \"funny\" and then noted blood in her mouth. Pt is awake, not following commands and isn't speaking. ABC's intact.   "

## 2017-09-28 NOTE — PROGRESS NOTES
ROOM # 215    Living Situation (if not independent, order SW consult):  Facility name: [facility] 2nd floor  : Mary Beth. Admin Greta    Activity level at baseline: pablo lift to w/c.  Activity level on admit:  Same    Pt requires feeding assistance      Patient registered to observation; given Patient Bill of Rights; given the opportunity to ask questions about observation status and their plan of care.  Patient has been oriented to the observation room, bathroom and call light is in place.    Discussed discharge goals and expectations with patient/family.

## 2017-09-28 NOTE — ED PROVIDER NOTES
History     Chief Complaint:  Seizures    History limited due to patient's dementia/alzheimer's and subsequently provided by EMS.  HPI   Zahira Sutherland is a 67 year old female with a history of dementia, alzheimer's, diabetes, and hypertension who presents to the emergency department today via EMS from her nursing home for evaluation of seizures. EMS report the nursing staff was checking on the patient when they noticed her breathing abnormally and then blood in her mouth prompting the call to EMS. Upon further discussion with the nursing staff at [facility], they note the patient was in a postictal state, but no gross tonic clonic movements were witnessed. Blood sugar 116. The patient is nonambulatory, not able to follow commands, and is not speaking. Nursing staff reported to EMS the patient is acting at her baseline.      Allergies:  Penicillins     Medications:    GABAPENTIN PO  nystatin (MYCOSTATIN) 825974 UNIT/GM POWD  OLANZAPINE PO  DULOXETINE HCL PO  gabapentin (NEURONTIN) 250 MG/5ML solution  cholecalciferol 2000 UNITS CAPS  donepezil (ARICEPT) 10 MG tablet  lisinopril (PRINIVIL,ZESTRIL) 10 MG tablet  aspirin 81 MG tablet    Past Medical History:    Word findings difficulty  Early onset Alzheimer's disease with behavioral disturbance  Major depressive disorder  Hypertension  Apraxia  Diabetes mellitus type II  Vitamin D deficiency  Dementia    Past Surgical History:    Back surgery  Hernia repair  Cholecystectomy  Gastric bypass    Family History:    Obesity  Cerebrovascular disease  Breast cancer  Heart disease  Colorectal cancer  Diabetes  Hypertension    Social History:  The patient was alone.  Smoking Status: Never  Smokeless Tobacco: Never  Alcohol Use: No  Marital Status:        Review of Systems   Unable to perform ROS: Dementia     Physical Exam   First Vitals:  /85  Pulse 93  Temp 98.2  F (36.8  C) (Temporal)  Resp 18  SpO2 91%    Physical Exam  General: Patient lying in the  bed, unable to answer questions or follow commands.    Mild twitching to bilateral upper extremities.    Facial twitching    Patient wearing a pull-up diaper.   HEENT:   The scalp and head appear normal    The pupils are equal, round, and reactive to light    Extraocular muscles are intact.    The nose is normal.    The oropharynx is normal.      Uvula is in the midline.  There is no peritonsillar abscess.    0.5 laceration to the left corner of the tongue.    Blood clot visible but no active bleeding.   Neck:  Normal range of motion.    Lungs:  Clear.      No rales, no wheezing.      There is no tachypnea.  Non-labored.  Cardiac: Regular rate.      Normal S1 and S2.      No S3 or S4.      No pathological murmur.      No pericardial rub.  Abdomen: Soft. No distension. No tympani. No rebound. Non-tender.  Lymph: No anterior or posterior cervical lymphadenopathy noted.  MS:  Normal tone.      Normal movement of all extremities.      Right lower extremity shortened by 3 inches compared to left.  Neuro:  Baseline mentation.  No focal motor or sensory changes.    Psych:  Staring off blankly, nonverbal.    Looks scared.   Skin:  No rash.      No lesions.      Emergency Department Course     ECG:  Indication: Seizure  Completed at 0810.  Read at 0810.   Normal sinus rhythm  Left axis deviation  Abnormal ECG  Rate 80 bpm. ME interval 180. QRS duration 108. QT/QTc 408/470. P-R-T axes 68 -37 48.    Imaging:  Radiology findings were communicated with the Admitting MD who voiced understanding of the findings.  CT Head w/o Contrast  IMPRESSION:  Diffuse cerebral volume loss and cerebral white matter  changes consistent with chronic small vessel ischemic disease. No  evidence for acute intracranial pathology.  No evidence for acute  intracranial pathology. No change from the comparison study.  Report per radiology     Chest XR PA & LAT   IMPRESSION: No acute disease.  Report per radiology     Laboratory:  Laboratory findings were  communicated with the Admitting MD who voiced understanding of the findings.  Troponin POCT (Collected 0819): 0.00  Creatinine POCT: Creatinine 0.9, GFR Estimate 62  CK total: 123  CBC: WNL. (WBC 8.9, HGB 14.5, )   BMP: Glucose 123 (H) o/w WNL (Creatinine 0.85)  Blood gas venous: pH Venous 7.20 (L), PCO2 Venous 56 (H), PO2 Venous 39, Bicarbonate 22, Base Deficit 7.1, FIO2 2   UA: slightly cloudy nj urine, protein albumin 100, urobilinogen 4.0 (H), RBC 11 (H), few bacteria, mucous present o/w WNL  ISTAT gases lactate fredy POCT: pH Venous 7.36, PCO2 Venous 53 (H), PO2 Venous 32, Bicarbonate Venous 30 (H), O2 Sat Venous 57, Lactic Acid 2.0    Paraneoplastic antibodies with reflex: Pending     Interventions:  0836 Zofran 4mg IV  1006 Keppra 1,000mg IV     Emergency Department Course:  Nursing notes and vitals reviewed.  The patient was sent for a Chest XR PA & LAT and CT Head w/o Contrast while in the emergency department, results above.   The patient provided a urine sample here in the emergency department. This was sent for laboratory testing, findings above.  The patient provided a urine sample here in the emergency department via catheter. This was sent for laboratory testing, findings above.  0754: Patient arrives to the ED via EMS.   0755: I performed an exam of the patient as documented above.   1000: Patient rechecked.   1006: I spoke with Dr. [facility] of the Neurology service regarding patient's presentation, findings, and plan of care.  1042 I spoke with Dr. Gutierrez of the hospitalist service regarding patient's presentation, findings, and plan of care.  Findings and plan explained to the admitting MD who consents to admission. Discussed the patient with Dr. Gutierrez, who will admit the patient to a obs bed for further monitoring, evaluation, and treatment.  I personally reviewed the laboratory and imaging results with the admitting MD and answered all related questions prior to admission.    Impression &  Plan      Medical Decision Making:  I think this patient had a grand mal seizure this morning. She bite her tongue and the staff at [facility] said her eyes were rolled back, but she was not shaking when they walked in but she was sleepy and appearing postictal. She has not had a seizure previously.     Examination today reveals that she did bite her tongue. As she became more alert and was responding to questions such as what her name was. She is orientated to self. Her baseline is severe early onset alzheimer, dementia, and baseline is alert but typically nonverbal or very little in terms of articulation. She does not follow commands.     CT was negative for any acute findings that could explain the grand mal seizure and labs are all essentially normal except she was a little acidotic likely related to breath holding and hypoxia during the seizure itself.     It was noted that her O2 sats were a little bit low when she presented, however, she's morbidly obese and likely experiences some degree of hypoventilation. Chest x-ray was negative for pneumonia. O2 sats at this time are low to mid 90's on room air now. Patient continues to rest comfortably, no more seizures activity. She was loaded with Keppra 1000mg IV. I spoke with Dr. [facility] from [facility]. Will admit the patient to observation. He asked that I order paraneoplastic panel which I did. I spoke with Morris Gutierrez of the hospitalist service to observation.     Diagnosis:    ICD-10-CM    1. Grand mal seizure (H) G40.409    2. 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's disease with early onset G30.0     F02.80      Disposition:  Admitted to obs    Scribe Disclosure:  I, Bren Wallis, am serving as a scribe at 7:56 AM on 9/28/2017 to document services personally performed by Donnie Taylor MD based on my observations and the provider's statements to me.     9/28/2017   [facility] EMERGENCY DEPARTMENT       Donnie Taylor MD  09/29/17 0724

## 2017-09-28 NOTE — IP AVS SNAPSHOT
` ` Patient Information     Patient Name Sex Zahira Koehler ([id number]) Female 1950       Room Bed    [facility] 0503-      Patient Demographics     Address Phone E-mail Address    [place] [id number] [facility] [address] [phone] (Home) [email or web address]      Patient Ethnicity & Race     Ethnic Group Patient Race    American White      Emergency Contact(s)     Name Relation Home Work Mobile    Rony Rice Daughter [phone]    Dominga Taylor Daughter [phone]      Niya Coelho Sister [phone]        Documents on File        Status Date Received Description       Documents for the Patient    Insurance Card  08     Privacy Notice - [place] Received 11     Face Sheet  () 08     Insurance Card  05/15/09     External Medication Information Consent Accepted () 11     Patient ID       Consent for Services - Hospital/Clinic Received () 11     Insurance Card Received 11     External Medication Information Consent Accepted () 08/15/11     Other Accepted 08/15/11 [employer]BS    [facility] [place] Authorization - File Only       Consent for EHR Access  13 Copied from existing Consent for services - C/HOD collected on 2011    [facility] Specified Other       External Medication Information Consent Accepted 13     Consent for Services - Hospital/Clinic Received () 13     Insurance Card Received 13     [facility] [place] Authorization - File Only Accepted 13     [facility] Authorization - File Only Received 11/15/13 Delma    Consent for Services/Privacy Notice - Hospital/Clinic       Advance Directives and Living Will Received 17 POLST 2017    Business/Insurance/Care Coordination/Health Form - Patient Received 17        Documents for the Encounter    CMS IM for Patient Signature Received 10/02/17     Observation Notice Received 17  GLEN [place]     CE Point of Care Auth Received        Admission Information     Attending Provider Admitting Provider Admission Type Admission Date/Time    La Mcintyre, DO La Mcintyre, DO Emergency 09/28/17  0753    Discharge Date Hospital Service Auth/Cert Status Service Area     Hospitalist Incomplete [facility]    Unit Room/Bed Admission Status        5 MEDICAL SURGICAL 0503/0503-01 Admission (Confirmed)       Admission     Complaint    Seizure (H), Seizure (H)      Hospital Account     Name Acct ID Class Status Primary Coverage    Zahira Sutherland [id number] Inpatient [facility]            Guarantor Account (for Hospital Account #[id number])     Name Relation to Pt Service Area Active? Acct Type    Zahira Sutherland  FCS Yes Personal/Family    Address Phone          [address] [address] [phone](H)              Coverage Information (for Hospital Account #[id number])     F/O Payor/Plan Precert #    ARE/[facility]     Subscriber Subscriber #    Zahira Sutherland [id number]    Address Phone    [address]  [place] [address] [phone]

## 2017-09-28 NOTE — CONSULTS
Neurology Consult Note  [facility]       [2017]                                                                                       Admission Date: 2017  Hospital Day: 1      Patient: Zahira Sutherland      : 1950  MRN:  [id number]     CC:      Chief Complaint   Patient presents with     Seizures       Consult Request:  Referring Provider:  Morris Gutierrez MD  Primary Care Provider:  Brianna Crespo MD        HPI:  Zahira Sutherland is a 67 year old yo female admitted for unwitnessed seizure. She was noted to have bleeding in her mouth with less responsiveness. She was brought to Er for change in her mental status from her baseline. She has advanced Alzheimer's with minimal baseline functioning. No history of seizures in the past.          A complete review of symptoms was performed including vascular, infectious, cardiovascular, pulmonary, gastrointestinal, endocrinological, hematologic, dermatologic, musculoskeletal, and neurological. All were normal except as above.    PAST MEDICAL HISTORY:  ALLERGIES:   Allergies   Allergen Reactions     Penicillins      Tobacco:    History   Smoking Status     Never Smoker   Smokeless Tobacco     Never Used     Alcohol:    Alcohol use No   Comment: Once in a great while     MEDICATIONS:       CURRENTLY SCHEDULED MEDICATIONS     sodium chloride (PF)  3 mL Intracatheter Q8H     aspirin  81 mg Oral Daily     donepezil (ARICEPT) tablet 10 mg  10 mg Oral Daily     DULoxetine (CYMBALTA) EC capsule 30 mg  30 mg Oral Daily     gabapentin (NEURONTIN) capsule 100 mg  100 mg Oral TID     lisinopril  10 mg Oral Daily     OLANZapine (zyPREXA) tablet 2.5 mg  2.5 mg Oral BID     levETIRAcetam  500 mg Oral BID          HOME MEDICATIONS  Prescriptions Prior to Admission   Medication Sig Dispense Refill Last Dose     DONEPEZIL HCL PO Take 10 mg by mouth daily   2017 at Unknown time     aspirin 81 MG chewable tablet  Take 81 mg by mouth daily   9/27/2017 at Unknown time     GABAPENTIN PO Take 100 mg by mouth 3 times daily   9/28/2017 at 0600     nystatin (MYCOSTATIN) 185497 UNIT/GM POWD Apply topically 2 times daily Twice daily to red areas.   9/27/2017 at Unknown time     OLANZAPINE PO Take 2.5 mg by mouth 2 times daily   9/27/2017 at Unknown time     DULOXETINE HCL PO Take 30 mg by mouth daily   9/27/2017 at Unknown time     gabapentin (NEURONTIN) 250 MG/5ML solution Take 50 mg by mouth every 2 hours as needed Not to exceed 6 as needed doses/24 hours   Taking     cholecalciferol 2000 UNITS CAPS Take 1 capsule by mouth daily   9/27/2017 at Unknown time     lisinopril (PRINIVIL,ZESTRIL) 10 MG tablet Take 1 tablet (10 mg) by mouth daily 30 tablet 0 9/27/2017 at Unknown time     Multiple Vitamin (MULTIVITAMIN) per tablet Take 1 tablet by mouth daily. 90 tablet 3 9/27/2017 at Unknown time     MEDICAL HISTORY  Past Medical History:   Diagnosis Date     Dementia      Diabetes      Hypertension      SURGICAL HISTORY  Past Surgical History:   Procedure Laterality Date     BACK SURGERY      hernia repair in her 38     CHOLECYSTECTOMY      in her 30s     GASTRIC BYPASS  1992     SURGICAL HISTORY OF -   1980s    back surgery- uncertain type     FAMILY HISTORY    Family History   Problem Relation Age of Onset     Obesity Mother      CEREBROVASCULAR DISEASE Mother      Breast Cancer Mother      HEART DISEASE Mother      Obesity Father      Cancer - colorectal Father      DIABETES Father      Other Cancer Father      Obesity Sister      Obesity Brother      Hypertension Sister      SOCIAL HISTORY  Social History     Social History     Marital status:      Spouse name: N/A     Number of children: 2     Years of education: N/A     Occupational History      None      Social History Main Topics     Smoking status: Never Smoker     Smokeless tobacco: Never Used     Alcohol use No      Comment: Once in a great while     Drug use: No      "Sexual activity: No     Other Topics Concern     Parent/Sibling W/ Cabg, Mi Or Angioplasty Before 65f 55m? No      Service No     Seat Belt Yes     Social History Narrative                  Temp: 97.7  F (36.5  C)        Temp src: Axillary         BP: 91/58   Heart Rate: 80     Estimated body mass index is 40.72 kg/(m^2) as calculated from the following:    Height as of 17: 1.676 m (5' 6\").    Weight as of 17: 114.4 kg (252 lb 4.8 oz).    Resp: 12   SpO2: 93 %   O2 Device: None (Room air)     Blood Pressure:   BP Readings from Last 3 Encounters:   17 91/58   17 105/71   09/15/17 99/60     T24 : Temp (24hrs), Av  F (36.7  C), Min:97.7  F (36.5  C), Max:98.2  F (36.8  C)       GENERAL EXAMINATION:  HEENT: PERRLA, EOMI.  Neck: Supple, No carotid bruits. No myofascial tender points.  Chest: Bilateral air entry present.  Heart: S1, S2 RRR.    NEUROLOGICAL EXAMINATION  Mental Status:  Patient is awake, alert, but not very responsive to commands.She could speak only one or 2 words. She did not follow any commands.    Cranial Nerves: Pupils are equal and reactive to light. Extraocular movements are intact. There is no nystagmus in the horizontal or vertical planes.No facial sensory deficits. No facial weakness. The tongue is midline.     Motor: Muscle tone is normal. There is no pronator drift. There is no muscle atrophy. She could not follow the instructions for strength exam. Deep tendon reflexes are 2+ and symmetric in his biceps, triceps, knees, and ankles. Plantars are up going/ withdrawal bilaterally.    Sensory: Could not be done.    Coordination: could not be done    Gait: could not be done.    .  LABORATORY RESULTS        Recent Labs  Lab 17  0810   WBC 8.9   HGB 14.5   HCT 45.6   MCV 99          Recent Labs  Lab 17  0819 17  0810   NA  --  141   POTASSIUM  --  4.5   CHLORIDE  --  108   CO2  --  24   ANIONGAP  --  9   GLC  --  123*   BUN  --  19   CR  --  " 0.85   GFRESTIMATED 62 66   GFRESTBLACK 75 80   EVY  --  9.2       Recent Labs  Lab 09/28/17  0855   COLOR Yady   APPEARANCE Slightly Cloudy   URINEGLC Negative   URINEBILI Negative   URINEKETONE Negative   SG 1.025   UBLD Negative   URINEPH 5.0   PROTEIN 100*   NITRITE Negative   LEUKEST Negative   RBCU 11*   WBCU 2       IMAGING RESULTS     Recent Results (from the past 24 hour(s))   CT Head w/o Contrast    Narrative    CT OF THE HEAD WITHOUT CONTRAST  9/28/2017 9:42 AM     COMPARISON: Head CT 7/25/2011.    HISTORY: Seizure.    TECHNIQUE: 5 mm thick axial CT images of the head were acquired  without IV contrast material.    FINDINGS:  There is moderate diffuse cerebral volume loss. There are  subtle patchy areas of decreased density in the cerebral white matter  bilaterally that are consistent with sequela of chronic small vessel  ischemic disease.     The ventricles and basal cisterns are within normal limits in  configuration given the degree of cerebral volume loss.  There is no  midline shift. There are no extra-axial fluid collections.     No intracranial hemorrhage, mass or recent infarct.    The visualized paranasal sinuses are well-aerated. There is no  mastoiditis. There are no fractures of the visualized bones.  Thickening and irregularity of the inner table of the left parietal  skull again noted without change. This area of thickening and  irregularity is adjacent to bony nodule on the inner table that could  represent a calcified meningioma. Therefore the adjacent thickening  could represent bony reaction to the meningioma.      Impression    IMPRESSION:  Diffuse cerebral volume loss and cerebral white matter  changes consistent with chronic small vessel ischemic disease. No  evidence for acute intracranial pathology.  No evidence for acute  intracranial pathology. No change from the comparison study.    Radiation dose for this scan was reduced using automated exposure  control, adjustment of the mA  and/or kV according to patient size, or  iterative reconstruction technique.    PATEL QUINTEROS MD   Chest XR,  PA & LAT    Narrative    CHEST TWO VIEWS 9/28/2017 9:44 AM     HISTORY: Hypoxia    COMPARISON: None.    FINDINGS: There are no acute infiltrates. The cardiac silhouette is  not enlarged. Pulmonary vasculature is unremarkable.      Impression    IMPRESSION: No acute disease.    KENDRA DUARTE MD       _____________________________________________________________________________          ASSESSMENT     1. Unwitnessed suspected seizure        RECOMMENDATIONS   1. Continue keppra.  2. Paraneoplastic antibody panel to evaluate antibody related epilepsy  3. EEG

## 2017-09-28 NOTE — IP AVS SNAPSHOT
"          [place] [address] MEDICAL SURGICAL: [phone]                                              INTERAGENCY TRANSFER FORM - LAB / IMAGING / EKG / EMG RESULTS   2017                    Hospital Admission Date: 2017  VILMA MARTINEZ   : 1950  Sex: Female        Attending Provider: La Mcintyre DO     Allergies:  Penicillins    Infection:  None   Service:  HOSPITALIST    Ht:  1.753 m (5' 9\")   Wt:  118 kg (260 lb 2.3 oz)   Admission Wt:  118 kg (260 lb 2.3 oz)    BMI:  38.42 kg/m 2   BSA:  2.4 m 2            Patient PCP Information     Provider PCP Type    GABRIELE Brooks CNP General         Lab Results - 3 Days      Paraneoplastic Antibodies with Reflex [[id number]]  Resulted: 10/03/17 1419, Result status: Final result    Ordering provider: Donnie Taylor MD  17 1024 Resulting lab: [facility]    Specimen Information    Type Source Collected On     17 1038          Components       Value Reference Range Flag Lab   Purkinje Cell Neuronal Nuclear IgG Screen None Detected None Detected  [facility]   Comment:         (Note)  RUSTAM-1, RUSTAM-2 or PCA-1 antibodies not detected,   confirmatory testing for Hu (RUSTAM-1), Ri (RUSTAM-2) or Yo   (PCA-1) IgG antibodies will not be performed.  INTERPRETIVE INFORMATION: Purkinje Cell/Neuronal Nuclear   IgG Scrn  Test developed and characteristics determined by [facility]. See Compliance Statement D: [facility]  Performed by [facility],  [address] [place] [address] [phone]  [email or web address], Kyle Perez MD, Lab. Director              Glucose by meter [[id number]]  Resulted: 10/03/17 1146, Result status: Final result    Ordering provider: La Mcintyre DO  10/03/17 1006 Resulting lab: POINT OF CARE TEST, GLUCOSE    Specimen Information    Type Source Collected On     10/03/17 1006          Components       Value Reference Range Flag Lab   Glucose 88 70 - 99 mg/dL  170 "            Glucose by meter [[id number]]  Resulted: 10/03/17 0811, Result status: Final result    Ordering provider: La Mcintyre, DO  10/03/17 0803 Resulting lab: POINT OF CARE TEST, GLUCOSE    Specimen Information    Type Source Collected On     10/03/17 0803          Components       Value Reference Range Flag Lab   Glucose 74 70 - 99 mg/dL  170            Glucose by meter [[id number]]  Resulted: 10/03/17 0641, Result status: Final result    Ordering provider: La Mcintyre, DO  10/03/17 0631 Resulting lab: POINT OF CARE TEST, GLUCOSE    Specimen Information    Type Source Collected On     10/03/17 0631          Components       Value Reference Range Flag Lab   Glucose 88 70 - 99 mg/dL  170            Glucose by meter [[id number]] (Abnormal)  Resulted: 10/02/17 2116, Result status: Final result    Ordering provider: La Mcintyre, DO  10/02/17 2022 Resulting lab: POINT OF CARE TEST, GLUCOSE    Specimen Information    Type Source Collected On     10/02/17 2022          Components       Value Reference Range Flag Lab   Glucose 130 70 - 99 mg/dL H 170            Urine Culture Aerobic Bacterial [[id number]] (Abnormal)  Resulted: 10/02/17 2039, Result status: Final result    Ordering provider: La Mcintyre, DO  10/01/17 0520 Resulting lab: MICRO RAPID TESTING LAB    Specimen Information    Type Source Collected On   Unspecified Urine  10/01/17 0520          Components       Value Reference Range Flag Lab   Specimen Description Unspecified Urine      Special Requests Specimen received in preservative   75   Culture Micro --  A 226   Result:         >100,000 colonies/mL  Escherichia coli              Glucose by meter [[id number]] (Abnormal)  Resulted: 10/01/17 2241, Result status: Final result    Ordering provider: La Mcintyre, DO  10/01/17 2008 Resulting lab: POINT OF CARE TEST, GLUCOSE    Specimen Information    Type Source Collected On     10/01/17 2008           Components       Value Reference Range Flag Lab   Glucose 100 70 - 99 mg/dL H 170            UA with Microscopic reflex to Culture [[id number]] (Abnormal)  Resulted: 10/01/17 0539, Result status: Final result    Ordering provider: Augusto Padilla MD  09/30/17 2234 Resulting lab: [facility]    Specimen Information    Type Source Collected On   Unspecified Urine Urine clean catch 10/01/17 0520          Components       Value Reference Range Flag Lab   Color Urine Yellow   FrRdHs   Appearance Urine Slightly Cloudy   FrRdHs   Glucose Urine Negative NEG^Negative mg/dL  FrRdHs   Bilirubin Urine Negative NEG^Negative  FrRdHs   Ketones Urine Negative NEG^Negative mg/dL  FrRdHs   Specific Gravity Urine 1.010 1.003 - 1.035  FrRdHs   Blood Urine Negative NEG^Negative  FrRdHs   pH Urine 6.0 5.0 - 7.0 pH  FrRdHs   Protein Albumin Urine Negative NEG^Negative mg/dL  FrRdHs   Urobilinogen mg/dL 4.0 0.0 - 2.0 mg/dL H FrRdHs   Nitrite Urine Negative NEG^Negative  FrRdHs   Leukocyte Esterase Urine Moderate NEG^Negative A FrRdHs   Source Unspecified Urine   FrRdHs   WBC Urine 31 0 - 2 /HPF H FrRdHs   RBC Urine 2 0 - 2 /HPF  FrRdHs   Bacteria Urine Many NEG^Negative /HPF A FrRdHs   Squamous Epithelial /HPF Urine <1 0 - 1 /HPF  FrRdHs   Mucous Urine Present NEG^Negative /LPF A FrRdHs            Glucose by meter [[id number]] (Abnormal)  Resulted: 09/30/17 1935, Result status: Final result    Ordering provider: La Mcintyre,   09/30/17 1905 Resulting lab: POINT OF CARE TEST, GLUCOSE    Specimen Information    Type Source Collected On     09/30/17 1905          Components       Value Reference Range Flag Lab   Glucose 119 70 - 99 mg/dL H 170            Glucose by meter [[id number]] (Abnormal)  Resulted: 09/30/17 1026, Result status: Final result    Ordering provider: La Mcintyre,   09/30/17 0913 Resulting lab: POINT OF CARE TEST, GLUCOSE    Specimen Information    Type Source Collected On      09/30/17 0913          Components       Value Reference Range Flag Lab   Glucose 146 70 - 99 mg/dL H 170            Testing Performed By     Lab - Abbreviation Name Director Address Valid Date Range    12 - [facility] Unknown [address]  [facility] [address] 05/08/15 1057 - Present    75 - Unknown [facility] Unknown [address] [facility] [address] 01/15/15 1019 - Present    170 - Unknown POINT OF CARE TEST, GLUCOSE Unknown Unknown 10/31/11 1114 - Present    226 - Unknown MICRO RAPID TESTING LAB Unknown [address] [facility] [address] 12/19/14 0955 - Present            Unresulted Labs     None      Encounter-Level Documents:     There are no encounter-level documents.      Order-Level Documents:     There are no order-level documents.

## 2017-09-28 NOTE — H&P
[facility]    History and Physical  Hospitalist       Date of Admission:  9/28/2017  Date of Service (when I saw the patient): 09/28/17    Assessment & Plan   Zahira Sutherland is a 67 year old female with early onset advanced dementia (was told Alzhemiers) with physical deterioration requiring total cares (at [facility]), wheelchair bound, HTN who presents with witnessed/presumed seizure.    1. Neuro- New seizure. Already started on Keppra in the ED after consulting with Dr. Phoenix. Will formally consult Neurology. Will order EEG. Paraneoplastic labs sent from ED as per Neurology. CT neg in ED. I reviewed her home meds. It does not appear that she is on any meds that put her ask risk for seizure. Seizure precautions. Has advanced dementia (was told Alzheimers) with physical deterioration. Cont home meds. Family states patient does fine in the hospital. Will have VPN  2. Cardiovascular- HTN- on lisinopril  3. Pulmonary- was slightly hypoxic in ED. Likely due to post-ictal state. Now resolved  4. GI- regular diet  5. ID- no signs/symptoms of infection  6. Renal- no issues. Does not need IVF if tolerating PO  7. Heme/Onc-no issues  8. Endo- not diabetic  9. Musculoskeletal- patient is wheelchair bound and needs total care  10. Prophylaxis- start if has prolonged stay. At her baseline mobility  11. Full code- reviewed with family  12. 2 daughters and 1 sister in room. All questions answered    Morris Gutierrez MD, Hospitalist  Pager: [phone]    Disposition: Expected discharge in likely 1 day    Morris Gutierrez MD    Primary Care Physician   [facility]    Chief Complaint   Seizure activity    History is obtained from the patient and patient's family and EMS notes. Sign out obtained from Dr. Taylor in the ED    History of Present Illness   Zahira Sutherland is a 67 year old female with early onset advanced dementia (was told Alzhemiers) with physical deterioration requiring total cares  (at [facility]), wheelchair bound, HTN who presents with witnessed/presumed seizure. Patient unable to give history. Apparently she has been in her usual state of health. This am the nursing staff was walking by her room when they saw her bleeding from her mouth and less responsive. This lasted about 1 min, then she was more responsive. EMS was called. Vitals were stable. It was described as seizure activity. She also had a lac on her tongue from where she bit it. Family sees her daily. No recent complaints of chest pain, sob, abdo pain, n/v/d, fever or chills, uri symptoms. Family state she is a little quieter than usual but is at her baseline. She has never had a seizure.     Past Medical History    I have reviewed this patient's medical history and updated it with pertinent information if needed.   Past Medical History:   Diagnosis Date     Dementia      Diabetes      Hypertension        Past Surgical History   I have reviewed this patient's surgical history and updated it with pertinent information if needed.  Past Surgical History:   Procedure Laterality Date     BACK SURGERY      hernia repair in her 38     CHOLECYSTECTOMY      in her 30s     GASTRIC BYPASS  1992     SURGICAL HISTORY OF -   1980s    back surgery- uncertain type       Prior to Admission Medications   Prior to Admission Medications   Prescriptions Last Dose Informant Patient Reported? Taking?   DONEPEZIL HCL PO 9/27/2017 at Unknown time  Yes Yes   Sig: Take 10 mg by mouth daily   DULOXETINE HCL PO 9/27/2017 at Unknown time  Yes Yes   Sig: Take 30 mg by mouth daily   GABAPENTIN PO 9/28/2017 at 0600  Yes Yes   Sig: Take 100 mg by mouth 3 times daily   Multiple Vitamin (MULTIVITAMIN) per tablet 9/27/2017 at Unknown time  No Yes   Sig: Take 1 tablet by mouth daily.   OLANZAPINE PO 9/27/2017 at Unknown time  Yes Yes   Sig: Take 2.5 mg by mouth 2 times daily   aspirin 81 MG chewable tablet 9/27/2017 at Unknown time  Yes Yes   Sig: Take 81 mg by mouth  daily   cholecalciferol 2000 UNITS CAPS 9/27/2017 at Unknown time  Yes Yes   Sig: Take 1 capsule by mouth daily   gabapentin (NEURONTIN) 250 MG/5ML solution   Yes Yes   Sig: Take 50 mg by mouth every 2 hours as needed Not to exceed 6 as needed doses/24 hours   lisinopril (PRINIVIL,ZESTRIL) 10 MG tablet 9/27/2017 at Unknown time  No Yes   Sig: Take 1 tablet (10 mg) by mouth daily   nystatin (MYCOSTATIN) 087410 UNIT/GM POWD 9/27/2017 at Unknown time  Yes Yes   Sig: Apply topically 2 times daily Twice daily to red areas.      Facility-Administered Medications: None     Allergies   Allergies   Allergen Reactions     Penicillins        Social History   I have reviewed this patient's social history and updated it with pertinent information if needed. Zahira Sutherland  reports that she has never smoked. She has never used smokeless tobacco. She reports that she does not drink alcohol or use illicit drugs.    Family History   I have reviewed this patient's family history and updated it with pertinent information if needed.   Family History   Problem Relation Age of Onset     Obesity Mother      CEREBROVASCULAR DISEASE Mother      Breast Cancer Mother      HEART DISEASE Mother      Obesity Father      Cancer - colorectal Father      DIABETES Father      Other Cancer Father      Obesity Sister      Obesity Brother      Hypertension Sister        Review of Systems   The 10 point Review of Systems is negative other than noted in the HPI or here.     Physical Exam   Temp: 98.2  F (36.8  C) Temp src: Temporal BP: 103/69 Pulse: 93 Heart Rate: 82 Resp: 18 SpO2: 92 % O2 Device: None (Room air) (O2 @ 2 ltrs via N/C applied)    Vital Signs with Ranges  Temp:  [98.2  F (36.8  C)] 98.2  F (36.8  C)  Pulse:  [93] 93  Heart Rate:  [58-82] 82  Resp:  [18] 18  BP: ()/() 103/69  SpO2:  [79 %-99 %] 92 %  0 lbs 0 oz    Exam:  GEN:  Alert,  appears comfortable, NAD. Non-verbal  HEENT:  Normocephalic/atraumatic, no scleral icterus,  no nasal discharge, mouth moist.  CV:  Regular rate and rhythm, no murmur or JVD.  S1 + S2 noted, no S3 or S4.  LUNGS:  Clear to auscultation bilaterally without rales/rhonchi/wheezing/retractions.  Symmetric chest rise on inhalation noted.  ABD:  Active bowel sounds, soft, non-tender/non-distended.  No rebound/guarding/rigidity.  EXT:  No edema or cyanosis.  Hands/feet warm to touch with good signs of peripheral perfusion.  No joint synovitis noted.  SKIN:  Dry to touch, no exanthems noted in the visualized areas.  NEURO:  Symmetric muscle strength, sensation to touch grossly intact.  No new focal deficits appreciated.    Data   Data reviewed today:  I personally reviewed all imaging and EKGs   Results for orders placed or performed during the hospital encounter of 09/28/17   CT Head w/o Contrast    Narrative    CT OF THE HEAD WITHOUT CONTRAST  9/28/2017 9:42 AM     COMPARISON: Head CT 7/25/2011.    HISTORY: Seizure.    TECHNIQUE: 5 mm thick axial CT images of the head were acquired  without IV contrast material.    FINDINGS:  There is moderate diffuse cerebral volume loss. There are  subtle patchy areas of decreased density in the cerebral white matter  bilaterally that are consistent with sequela of chronic small vessel  ischemic disease.     The ventricles and basal cisterns are within normal limits in  configuration given the degree of cerebral volume loss.  There is no  midline shift. There are no extra-axial fluid collections.     No intracranial hemorrhage, mass or recent infarct.    The visualized paranasal sinuses are well-aerated. There is no  mastoiditis. There are no fractures of the visualized bones.  Thickening and irregularity of the inner table of the left parietal  skull again noted without change. This area of thickening and  irregularity is adjacent to bony nodule on the inner table that could  represent a calcified meningioma. Therefore the adjacent thickening  could represent bony reaction to  the meningioma.      Impression    IMPRESSION:  Diffuse cerebral volume loss and cerebral white matter  changes consistent with chronic small vessel ischemic disease. No  evidence for acute intracranial pathology.  No evidence for acute  intracranial pathology. No change from the comparison study.    Radiation dose for this scan was reduced using automated exposure  control, adjustment of the mA and/or kV according to patient size, or  iterative reconstruction technique.    PATEL QUINTEROS MD   Chest XR,  PA & LAT    Narrative    CHEST TWO VIEWS 9/28/2017 9:44 AM     HISTORY: Hypoxia    COMPARISON: None.    FINDINGS: There are no acute infiltrates. The cardiac silhouette is  not enlarged. Pulmonary vasculature is unremarkable.      Impression    IMPRESSION: No acute disease.    KENDRA DUARTE MD         Recent Labs  Lab 09/28/17  0953 09/28/17  0810   PHV 7.36 7.20*   PO2V 32 39   PCO2V 53* 56*   HCO3V 30* 22       Recent Labs  Lab 09/28/17  0810   WBC 8.9   HGB 14.5   HCT 45.6   MCV 99             Lab Results   Component Value Date     09/28/2017     05/31/2017     05/13/2013    Lab Results   Component Value Date    CHLORIDE 108 09/28/2017    CHLORIDE 109 05/31/2017    CHLORIDE 102 05/13/2013    Lab Results   Component Value Date    BUN 19 09/28/2017    BUN 18 05/31/2017    BUN 12 05/13/2013      Lab Results   Component Value Date    POTASSIUM 4.5 09/28/2017    POTASSIUM 3.8 05/31/2017    POTASSIUM 4.0 05/13/2013    Lab Results   Component Value Date    CO2 24 09/28/2017    CO2 25 05/31/2017    CO2 26 05/13/2013    Lab Results   Component Value Date    CR 0.85 09/28/2017    CR 0.72 05/31/2017    CR 0.92 05/13/2013          Recent Labs  Lab 09/28/17  0855   COLOR Yady   APPEARANCE Slightly Cloudy   URINEGLC Negative   URINEBILI Negative   URINEKETONE Negative   SG 1.025   UBLD Negative   URINEPH 5.0   PROTEIN 100*   NITRITE Negative   LEUKEST Negative   RBCU 11*   WBCU 2       Recent Results  (from the past 24 hour(s))   CT Head w/o Contrast    Narrative    CT OF THE HEAD WITHOUT CONTRAST  9/28/2017 9:42 AM     COMPARISON: Head CT 7/25/2011.    HISTORY: Seizure.    TECHNIQUE: 5 mm thick axial CT images of the head were acquired  without IV contrast material.    FINDINGS:  There is moderate diffuse cerebral volume loss. There are  subtle patchy areas of decreased density in the cerebral white matter  bilaterally that are consistent with sequela of chronic small vessel  ischemic disease.     The ventricles and basal cisterns are within normal limits in  configuration given the degree of cerebral volume loss.  There is no  midline shift. There are no extra-axial fluid collections.     No intracranial hemorrhage, mass or recent infarct.    The visualized paranasal sinuses are well-aerated. There is no  mastoiditis. There are no fractures of the visualized bones.  Thickening and irregularity of the inner table of the left parietal  skull again noted without change. This area of thickening and  irregularity is adjacent to bony nodule on the inner table that could  represent a calcified meningioma. Therefore the adjacent thickening  could represent bony reaction to the meningioma.      Impression    IMPRESSION:  Diffuse cerebral volume loss and cerebral white matter  changes consistent with chronic small vessel ischemic disease. No  evidence for acute intracranial pathology.  No evidence for acute  intracranial pathology. No change from the comparison study.    Radiation dose for this scan was reduced using automated exposure  control, adjustment of the mA and/or kV according to patient size, or  iterative reconstruction technique.    PATEL QUINTEROS MD   Chest XR,  PA & LAT    Narrative    CHEST TWO VIEWS 9/28/2017 9:44 AM     HISTORY: Hypoxia    COMPARISON: None.    FINDINGS: There are no acute infiltrates. The cardiac silhouette is  not enlarged. Pulmonary vasculature is unremarkable.      Impression     IMPRESSION: No acute disease.    KENDRA DUARTE MD

## 2017-09-28 NOTE — PHARMACY-ADMISSION MEDICATION HISTORY
Admission medication history interview status for this patient is complete. See [facility] admission navigator for allergy information, prior to admission medications and immunization status.     Medication history interview source(s):[facility] AADMS  Medication history resources (including written lists, pill bottles, clinic record):MAR    Changes made to PTA medication list:  Added: none  Deleted: none  Changed: none    Actions taken by pharmacist (provider contacted, etc):None     Additional medication history information:None    Medication reconciliation/reorder completed by provider prior to medication history? No    Do you take OTC medications (eg tylenol, ibuprofen, fish oil, eye/ear drops, etc)? Y(Y/N)    For patients on insulin therapy: N (Y/N)  Lantus/levemir/NPH/Mix 70/30 dose:   (Y/N) (see Med list for doses)   Sliding scale Novolog Y/N  If Yes, do you have a baseline novolog pre-meal dose:  units with meals  Patients eat three meals a day:   Y/N    How many episodes of hypoglycemia do you have per week: _______  How many missed doses do you have per week: ______  How many times do you check your blood glucose per day: _______   Any Barriers to therapy - Be specific :  cost of medications, comfortable with giving injections (if applicable), comfortable and confident with current diabetes regimen: Y/N ______________      Prior to Admission medications    Medication Sig Last Dose Taking? Auth Provider   DONEPEZIL HCL PO Take 10 mg by mouth daily 9/27/2017 at Unknown time Yes Unknown, Entered By History   aspirin 81 MG chewable tablet Take 81 mg by mouth daily 9/27/2017 at Unknown time Yes Unknown, Entered By History   GABAPENTIN PO Take 100 mg by mouth 3 times daily 9/28/2017 at 0600 Yes Reported, Patient   nystatin (MYCOSTATIN) 428247 UNIT/GM POWD Apply topically 2 times daily Twice daily to red areas. 9/27/2017 at Unknown time Yes Reported, Patient   OLANZAPINE PO Take 2.5 mg by mouth 2 times daily 9/27/2017 at  Unknown time Yes Reported, Patient   DULOXETINE HCL PO Take 30 mg by mouth daily 9/27/2017 at Unknown time Yes Reported, Patient   gabapentin (NEURONTIN) 250 MG/5ML solution Take 50 mg by mouth every 2 hours as needed Not to exceed 6 as needed doses/24 hours  Yes Reported, Patient   cholecalciferol 2000 UNITS CAPS Take 1 capsule by mouth daily 9/27/2017 at Unknown time Yes Reported, Patient   lisinopril (PRINIVIL,ZESTRIL) 10 MG tablet Take 1 tablet (10 mg) by mouth daily 9/27/2017 at Unknown time Yes Trevin Syed MD   Multiple Vitamin (MULTIVITAMIN) per tablet Take 1 tablet by mouth daily. 9/27/2017 at Unknown time Yes Trevin Syed MD

## 2017-09-28 NOTE — IP AVS SNAPSHOT
` [place] [address] MEDICAL SURGICAL: [phone]            Medication Administration Report for Zahira Sutherland as of 10/03/17 1506   Legend:    Given Hold Not Given Due Canceled Entry Other Actions    Time Time (Time) Time  Time-Action       Inactive    Active    Linked        Medications 09/27/17 09/28/17 09/29/17 09/30/17 10/01/17 10/02/17 10/03/17    0.45% sodium chloride + KCl 20 mEq/L infusion  Rate: 50 mL/hr Freq: CONTINUOUS Route: IV  Start: 10/01/17 1500        1609 ( )-New Bag        0122 ( )-Rate/Dose Verify       0409 ( )-New Bag       0546 ( )-Rate/Dose Verify       0940 ( )-Rate/Dose Change       2135 ( )-New Bag            acetaminophen (TYLENOL) tablet 650 mg  Dose: 650 mg Freq: EVERY 4 HOURS PRN Route: PO  PRN Reason: mild pain  Start: 09/28/17 1440   Admin Instructions: Alternate ibuprofen (if ordered) with acetaminophen.  Maximum acetaminophen dose from all sources = 75 mg/kg/day not to exceed 4 grams/day.               aspirin chewable tablet 81 mg  Dose: 81 mg Freq: DAILY Route: PO  Start: 09/28/17 1441     (1820)-Not Given       (2042)-Not Given        0926 (81 mg)-Given        0903 (81 mg)-Given        0824 (81 mg)-Given        0940 (81 mg)-Given        0757 (81 mg)-Given           cefTRIAXone (ROCEPHIN) 1 g vial to attach to  mL bag for ADULTS or NS 50 mL bag for PEDS  Dose: 1 g Freq: EVERY 24 HOURS Route: IV  Indications of Use: URINARY TRACT INFECTION  Last Dose: 1 g (10/03/17 1147)  Start: 10/02/17 0930         1115 (1 g)-New Bag        1147 (1 g)-New Bag           donepezil (ARICEPT) tablet 10 mg  Dose: 10 mg Freq: DAILY Route: PO  Start: 09/28/17 1441     (1821)-Not Given       (2042)-Not Given        0927 (10 mg)-Given        0902 (10 mg)-Given        0824 (10 mg)-Given        0939 (10 mg)-Given        0755 (10 mg)-Given           DULoxetine (CYMBALTA) EC capsule 30 mg  Dose: 30 mg Freq: DAILY Route: PO  Start: 09/28/17 1441     (1820)-Not Given       (2043)-Not  "Given        0927 (30 mg)-Given        0903 (30 mg)-Given        0824 (30 mg)-Given        0940 (30 mg)-Given        0756 (30 mg)-Given           gabapentin (NEURONTIN) capsule 100 mg  Dose: 100 mg Freq: 3 TIMES DAILY Route: PO  Start: 09/28/17 1441     (1820)-Not Given       (2043)-Not Given        0926 (100 mg)-Given       (1656)-Not Given [C]       2014 (100 mg)-Given        0902 (100 mg)-Given       1352 (100 mg)-Given       1907 (100 mg)-Given        0824 (100 mg)-Given       1400 (100 mg)-Given       2010 (100 mg)-Given        0939 (100 mg)-Given       1402 (100 mg)-Given       2017 (100 mg)-Given        0757 (100 mg)-Given       1341 (100 mg)-Given       [ ] 2000             Dose: 0.5 mL Freq: PRIOR TO DISCHARGE Route: IM  Start: 10/02/17 1000   End: 10/03/17 1459   Admin Instructions: Administer when afebrile (less than 100.4F) x 24 hours, or Prior to Discharge.  If not administering when scheduled , change the due time by following the instructions in the reference link below.  If patient refuses vaccine, chart as Vaccine Refused.           1459-Med Discontinued       levETIRAcetam (KEPPRA) tablet 1,000 mg  Dose: 1,000 mg Freq: 2 TIMES DAILY Route: PO  Start: 09/29/17 2000 2014 (1,000 mg)-Given        0902 (1,000 mg)-Given       1907 (1,000 mg)-Given        0824 (1,000 mg)-Given       2009 (1,000 mg)-Given        0939 (1,000 mg)-Given       2017 (1,000 mg)-Given        0755 (1,000 mg)-Given       [ ] 2000           lidocaine (LMX4) kit  Freq: EVERY 1 HOUR PRN Route: Top  PRN Reason: pain  PRN Comment: with VAD insertion or accessing implanted port.  Start: 09/28/17 0802   Admin Instructions: Do NOT give if patient has a history of allergy to any local anesthetic or any \"rigoberto\" product.   Apply 30 minutes prior to VAD insertion or port access.  MAX Dose:  2.5 g (  of 5 g tube)               lidocaine 1 % 1 mL  Dose: 1 mL Freq: EVERY 1 HOUR PRN Route: OTHER  PRN Comment: mild pain with VAD insertion or " "accessing implanted port  Start: 09/28/17 0802   Admin Instructions: Do NOT give if patient has a history of allergy to any local anesthetic or any \"rigoberto\" product. MAX dose 1 mL subcutaneous OR intradermal in divided doses.               miconazole (MICATIN; MICRO GUARD) 2 % powder  Freq: EVERY 1 HOUR PRN Route: Top  PRN Reason: other  PRN Comment: topical candidiasis  Start: 10/02/17 1153   Admin Instructions: Apply to affected area.                 naloxone (NARCAN) injection 0.1-0.4 mg  Dose: 0.1-0.4 mg Freq: EVERY 2 MIN PRN Route: IV  PRN Reason: opioid reversal  Start: 09/28/17 1440   Admin Instructions: For respiratory rate LESS than or EQUAL to 8.  Partial reversal dose:  0.1 mg titrated q 2 minutes for Analgesia Side Effects Monitoring Sedation Level of 3 (frequently drowsy, arousable, drifts to sleep during conversation).Full reversal dose:  0.4 mg bolus for Analgesia Side Effects Monitoring Sedation Level of 4 (somnolent, minimal or no response to stimulation).               OLANZapine (zyPREXA) tablet 2.5 mg  Dose: 2.5 mg Freq: 2 TIMES DAILY Route: PO  Start: 09/28/17 2000   Admin Instructions: Combined IM and PO doses may significantly increase the risk of orthostatic hypotension at 30 mg per day or higher.      (2131)-Not Given [C]        0928 (2.5 mg)-Given       2014 (2.5 mg)-Given        0903 (2.5 mg)-Given       1907 (2.5 mg)-Given        0824 (2.5 mg)-Given       2010 (2.5 mg)-Given        0940 (2.5 mg)-Given       2017 (2.5 mg)-Given        0756 (2.5 mg)-Given       [ ] 2000           ondansetron (ZOFRAN) injection 4 mg  Dose: 4 mg Freq: EVERY 6 HOURS PRN Route: IV  PRN Reasons: nausea,vomiting  Start: 09/28/17 1440   Admin Instructions: Irritant.               sodium chloride (PF) 0.9% PF flush 3 mL  Dose: 3 mL Freq: EVERY 8 HOURS Route: IK  Start: 09/28/17 0805   Admin Instructions: And Q1H PRN, to lock peripheral IV dormant line.      1820 (3 mL)-Given               0447 (3 mL)-Given     "   0929 (3 mL)-Given       (1657)-Not Given        0032 (3 mL)-Given       0909 (3 mL)-Given       1914 (3 mL)-Given        0016 (3 mL)-Given       0825 (3 mL)-Given       1609 (3 mL)-Given        (0124)-Not Given       (0930)-Not Given       (1538)-Not Given        (0045)-Not Given       0808 (3 mL)-Given       [ ] 1600           sodium chloride (PF) 0.9% PF flush 3 mL  Dose: 3 mL Freq: EVERY 1 HOUR PRN Route: IK  PRN Reason: line flush  PRN Comment: for peripheral IV flush post IV meds  Start: 09/28/17 0802             Completed Medications  Medications 09/27/17 09/28/17 09/29/17 09/30/17 10/01/17 10/02/17 10/03/17         Dose: 0.5 mL Freq: PRIOR TO DISCHARGE Route: IM  Start: 10/03/17 1458   End: 10/03/17 1503   Admin Instructions: Administer when afebrile (less than 100.4F) x 24 hours, or Prior to Discharge.  If not administering when scheduled , change the due time by following the instructions in the reference link below.  If patient refuses vaccine, chart as Vaccine Refused.           1503 (0.5 mL)-Given             Dose: 500 mg Freq: EVERY 24 HOURS Route: IV  Last Dose: 500 mg (09/29/17 2037)  Start: 09/29/17 1930   End: 10/01/17 2008      2037 (500 mg)-New Bag        1913 (500 mg)-New Bag        2008 (500 mg)-New Bag            Discontinued Medications  Medications 09/27/17 09/28/17 09/29/17 09/30/17 10/01/17 10/02/17 10/03/17         Dose: 250 mg Freq: EVERY 12 HOURS SCHEDULED Route: PO  Indications of Use: URINARY TRACT INFECTION  Start: 10/01/17 0815   End: 10/02/17 0920   Admin Instructions: Administer at least 2 hours before or 4 hours after aluminum, calcium, iron, zinc or magnesium containing medications. May be taken with food or on an empty stomach.  Do not administer alone with a dairy product like milk or yogurt or calcium-fortified juice,  but may be administered with a meal containing dairy.         0915 (250 mg)-Given       2010 (250 mg)-Given        0920-Med Discontinued

## 2017-09-29 NOTE — UTILIZATION REVIEW
"  Admission Status; Secondary Review Determination         Under the authority of the Utilization Management Committee, the utilization review process indicated a secondary review on the above patient.  The review outcome is based on review of the medical records, discussions with staff, and applying clinical experience noted on the date of the review.        (X)      Inpatient Status Appropriate - This patient's medical care is consistent with medical management for inpatient care and reasonable inpatient medical practice.      () Observation Status Appropriate - This patient does not meet hospital inpatient criteria and is placed in observation status. If this patient's primary payer is Medicare and was admitted as an inpatient, Condition Code 44 should be used and patient status changed to \"observation\".   () Admission Status NOT Appropriate - This patient's medical care is not consistent with medical management for Inpatient or Observation Status.          RATIONALE FOR DETERMINATION     67 year old female with early onset advanced dementia (was told [facility]) with physical deterioration requiring total cares (at [facility]), wheelchair bound, HTN who presents with witnessed/presumed seizure.  She had an altered LOC with tongue biting.  She was hypoxic in the ED with a pH of 7.2.  She was placed on Keppra, and a neurology consult was ordered.  She was placed in the hospital under Observation status.  She has had persistent hypotension with intermittent hypoxia.  Heart rate in the 40s and 50s. with intermittent tachycardia.  EEG pending.  She will receive ongoing IV fluids, seizure precautions, and close hemodynamic monitoring.  I would recommend placement in Inpatient status.  I discussed the case with Dr Mcintyre.      The severity of illness, intensity of service provided, expected LOS and risk for adverse outcome make the care complex, high risk and appropriate for hospital admission.        The " information on this document is developed by the utilization review team in order for the business office to ensure compliance.  This only denotes the appropriateness of proper admission status and does not reflect the quality of care rendered.         The definitions of Inpatient Status and Observation Status used in making the determination above are those provided in the CMS Coverage Manual, Chapter 1 and Chapter 6, section 70.4.      Sincerely,     Derek Durham MD  Physician Advisor  Utilization Review/ Case Management  [facility].

## 2017-09-29 NOTE — PLAN OF CARE
Problem: Patient Care Overview  Goal: Plan of Care/Patient Progress Review  PRIMARY DIAGNOSIS: SEIZURE  OUTPATIENT/OBSERVATION GOALS TO BE MET BEFORE DISCHARGE:  1. ADLs back to baseline: Yes      2. Activity and level of assistance: total assist      3. Pain status: Pain free.      4. Return to near baseline physical activity: Yes          Pt alert, LORIN orientation. Pt baseline mostly nonverbal, does answer some yes/no questions. Denies pain. Pt requires turn Q2h and assistance with feeding. Pt heavy assist x2-3 with turning/changing for incontinence care. VPM in room to monitor for seizure activity. Per tele, ST HR 1-teens. HR elevated after pt turned and changed, max 130s. PA notified. Bed alarm on for safety. Family at bedside. Will continue to monitor.     Discharge Planner Nurse   Safe discharge environment identified: Yes  Barriers to discharge: Yes       Entered by: No Fiore 09/28/2017 4:57 PM     Please review provider order for any additional goals.   Nurse to notify provider when observation goals have been met and patient is ready for discharge.

## 2017-09-29 NOTE — PROGRESS NOTES
Routine, portable EEG number  done in patient room.    EEG ordered by Dr Gutierrez to [facility] for seizure activity.    Leisa Max, RT  9/29/2017 4:37 PM

## 2017-09-29 NOTE — CONSULTS
Care Transition Initial Assessment - SW     Met with: spoke with Caregiver at [facility]     Active Problems:    Seizure (H)         DATA               . Pt lives at [facility]  Identified issues/concerns regarding health management:   PIPPA spoke with [facility] nurse on the 2nd floor [phone] where the pt resides. Nurse states pt usually feeds herself and is not a morning person, they usually don't wake her up until at least 10am. She at baseline is able to feed herself and is not much of a water drinker. The nurse states that she does have some behavorial issues and she will try to strike at staff. They use a Vanessa lift with her. There is no issues with her returning back to the facility over the weekend.                   Please fax discharge orders to [phone] [facility].    ASSESSMENT  Cognitive Status:  Not verbal  Concerns to be addressed: seizure activity .       PLAN  Financial costs for the patient includes none .  Patient anticipates discharging to:  Back to [facility] .    Jo Morley RN, BSN CTS  Care Coordinator  [phone]

## 2017-09-29 NOTE — PROGRESS NOTES
"[facility]  Hospitalist Observation Unit Progress Note  Name: Zahira Sutherland    MRN: [id number]  Provider:  La Mcintyre DO    Initial presenting complaint/issue to hospital (Diagnosis): possible seizure at long-term care facility         Assessment and Plan:      Summary of Stay: Zahira Sutherland is a 67 year old female admitted on 9/28/2017 with concern for seizure activity.    Problem List:   1.  Seizure, new-onset  Neurology consult appreciated - awaiting f/u recommendations from rounds today.  keppra ordered   Will add-on TSH and magnesium, as well  EEG ordered for today   Head CT negative for acute pathology -family does not think that she would tolerate a MRI  If unable to tolerate po - will need to provide IV dosing.    2. Hypotension - with h/o HTN  Will do NS bolus x 1 now  Reposition and recheck  HOLD home dose of lisinopril    3.  Hypoxia  No pna on CXR  No h/o JONE  Will wean oxygen this am and sit up in bed  Unable to perform IS    4. Early onset Alzheimer's Disease  Daughter states that she has seen [facility] neurology in [place] - will attempt to obtain records  Baseline has bladder/bowel incontinence, full lift (non-ambulatory), needs to be spoon-fed and meds crushed, minimally verbal    5.  Swallowing difficulty  At baseline is \"spoon-fed\"  Aspiration precautions - may need speech  Crushes meds in applesauce at baseline - discussed with nursing.    DVT Prophylaxis:  -  Will add pcd's  Code Status: Full Code  Discharge Dispo: return to facility, if able - SW consult  Estimated Disch Date / # of Days until Discharge: later today or tomorrow pending on disposition.    Addendum 12noon - repeat sbp still in the 90's.  Incontinent of urine, but was concentrated per nursing.  Will encourage po intake.  Will start some IVF for the next 8 hours and monitor I/O and BP closely.  D/W UR - pt changed to in-patient status.     Observation unit physician is available until 1400.  After 1400, " "please contact the observation unit Physician Assistant if questions/concerns.        Interval History:      Seen with daughter in the room.  States that her Mom \"doesn't have her sparkle today\".  More sleepy, per family.  No F/C overnight.  Low BP this am.  Tele - sinus jerrica 55-60 bpm.  No other new concerns per nursing.                  Physical Exam:      Last Vital Signs:  Temp: 98.2  F (36.8  C) Temp src: Axillary BP: (!) 89/47 Pulse: 64 Heart Rate: 49 Resp: 18 SpO2: 96 % O2 Device: Nasal cannula Oxygen Delivery: 2 LPM       GEN:  Sleepy, but easily arousible, calm  HEENT:  Normocephalic/atraumatic, PERRL, no scleral icterus, no nasal discharge, mouth moist, no clear oral ulcers or thrush noted to limited exam---but membranes dry.  Exam limited as pt is resisting my exam.  Dried blood to the left side of her face.  NECK:  No clear thyromegaly of clear JVD  CV:  Regular rate and rhythm, no loud murmur to ausc.  S1 + S2 noted, no S3 or S4.  LUNGS:  Clear to auscultation bilaterally ant/lat.  No clear rales/rhonchi/wheezing auscultated bilaterally. Diminished air exchange at the bases bilaterally.  No costal retractions bilaterally.  Symmetric chest rise on inhalation noted.  ABD:  Active bowel sounds, soft, non-tender/non-distended.  No rebound/guarding/rigidity.  No masses palpated.  No obvious HSM to exam.  EXT:  No edema or cyanosis bilaterally. No joint synovitis noted.  No calf-tenderness or asymmetry noted.  SKIN:  Dry to touch, no rashes or jaundice noted.  PSYCH:  Mood flattened Not tearful or depressed.  Maintains direct eye contact.  NEURO:  No tremors at rest, no contractures.  Withdraws to pain and moves arms and legs.  Babinski down-going bilaterally.  Is able to verbalize a few words.       Medications:      All current medications were reviewed.         Data:      All new lab and imaging data was reviewed.   Labs:  No results for input(s): CULT in the last 168 hours.    Recent Labs  Lab " 09/28/17  0819 09/28/17  0810   NA  --  141   POTASSIUM  --  4.5   CHLORIDE  --  108   CO2  --  24   ANIONGAP  --  9   GLC  --  123*   BUN  --  19   CR  --  0.85   GFRESTIMATED 62 66   GFRESTBLACK 75 80   EVY  --  9.2       Recent Labs  Lab 09/28/17  0810   WBC 8.9   HGB 14.5   HCT 45.6   MCV 99          Recent Labs  Lab 09/28/17  0819 09/28/17  0810   NA  --  141   POTASSIUM  --  4.5   CHLORIDE  --  108   CO2  --  24   ANIONGAP  --  9   GLC  --  123*   BUN  --  19   CR  --  0.85   GFRESTIMATED 62 66   GFRESTBLACK 75 80   EVY  --  9.2     No results for input(s): TSH in the last 168 hours.    Recent Labs  Lab 09/28/17  0855   COLOR Yady   APPEARANCE Slightly Cloudy   URINEGLC Negative   URINEBILI Negative   URINEKETONE Negative   SG 1.025   UBLD Negative   URINEPH 5.0   PROTEIN 100*   NITRITE Negative   LEUKEST Negative   RBCU 11*   WBCU 2      Recent Imaging:   Recent Results (from the past 24 hour(s))   CT Head w/o Contrast    Narrative    CT OF THE HEAD WITHOUT CONTRAST  9/28/2017 9:42 AM     COMPARISON: Head CT 7/25/2011.    HISTORY: Seizure.    TECHNIQUE: 5 mm thick axial CT images of the head were acquired  without IV contrast material.    FINDINGS:  There is moderate diffuse cerebral volume loss. There are  subtle patchy areas of decreased density in the cerebral white matter  bilaterally that are consistent with sequela of chronic small vessel  ischemic disease.     The ventricles and basal cisterns are within normal limits in  configuration given the degree of cerebral volume loss.  There is no  midline shift. There are no extra-axial fluid collections.     No intracranial hemorrhage, mass or recent infarct.    The visualized paranasal sinuses are well-aerated. There is no  mastoiditis. There are no fractures of the visualized bones.  Thickening and irregularity of the inner table of the left parietal  skull again noted without change. This area of thickening and  irregularity is adjacent to bony  nodule on the inner table that could  represent a calcified meningioma. Therefore the adjacent thickening  could represent bony reaction to the meningioma.      Impression    IMPRESSION:  Diffuse cerebral volume loss and cerebral white matter  changes consistent with chronic small vessel ischemic disease. No  evidence for acute intracranial pathology.  No evidence for acute  intracranial pathology. No change from the comparison study.    Radiation dose for this scan was reduced using automated exposure  control, adjustment of the mA and/or kV according to patient size, or  iterative reconstruction technique.    PATEL QUINTEROS MD   Chest XR,  PA & LAT    Narrative    CHEST TWO VIEWS 9/28/2017 9:44 AM     HISTORY: Hypoxia    COMPARISON: None.    FINDINGS: There are no acute infiltrates. The cardiac silhouette is  not enlarged. Pulmonary vasculature is unremarkable.      Impression    IMPRESSION: No acute disease.    KENDRA DUARTE MD

## 2017-09-29 NOTE — PLAN OF CARE
Problem: Patient Care Overview  Goal: Plan of Care/Patient Progress Review  PRIMARY DIAGNOSIS: SEIZURE  OUTPATIENT/OBSERVATION GOALS TO BE MET BEFORE DISCHARGE:  1. ADLs back to baseline: Yes      2. Activity and level of assistance: total assist      3. Pain status: Pain free.      4. Return to near baseline physical activity: Yes          Pt is currently sleeping, BP soft but increased from earlier this shift.  HR in the 50's-60's.  Pt baseline mostly nonverbal, does answer some yes/no questions.Pt requires turn Q2h and assistance with feeding. Pt heavy assist x2-3 with turning/changing for incontinence care. Patient has been dry so far this shift. VPM in room to monitor for seizure activity and Bed alarm on for safety. Will continue to monitor, assess, and offer supportive cares.     Discharge Planner Nurse   Safe discharge environment identified: Yes  Barriers to discharge: Yes       Entered by: Cassia Manrique 09/29/2017 4:57 PM     Please review provider order for any additional goals.   Nurse to notify provider when observation goals have been met and patient is ready for discharge.

## 2017-09-29 NOTE — PROGRESS NOTES
Patient changed to inpatient status.  Ate breakfast with total assist, good appetite.  Incontinent of bowel and bladder, panus folds reddened.  EEG performed.  No seizure activity observed, continues with VPM.  Transferred to [facility], report given.

## 2017-09-29 NOTE — PLAN OF CARE
Problem: Patient Care Overview  Goal: Plan of Care/Patient Progress Review  Outcome: No Change  Problem: Patient Care Overview  Goal: Plan of Care/Patient Progress Review  PRIMARY DIAGNOSIS: SEIZURE  OUTPATIENT/OBSERVATION GOALS TO BE MET BEFORE DISCHARGE:  1. ADLs back to baseline: Yes      2. Activity and level of assistance: total assist      3. Pain status: Pain free.      4. Return to near baseline physical activity: Yes      Discharge Planner Nurse   Safe discharge environment identified: Yes  Barriers to discharge: Yes            Please review provider order for any additional goals.   Nurse to notify provider when observation goals have been met and patient is ready for discharge.     Alert, confused, able to answer yes/no questions at times otherwise speech has been garbled, per baseline per daughter.  BP soft 89/47, IV bolus ordered.  Good appetite, requires total assist with feeding.  Incontinent of large amount urine, small stool.  Turned and repositioned with assist 2 and mechanical lift.

## 2017-09-29 NOTE — PROGRESS NOTES
Pt is sleeping comfortably, BP 89/48, HR 49, MD notified. No new orders at this time, Plan is to continue to monitor VSS.

## 2017-09-29 NOTE — PLAN OF CARE
Problem: Patient Care Overview  Goal: Plan of Care/Patient Progress Review  PRIMARY DIAGNOSIS: SEIZURE  OUTPATIENT/OBSERVATION GOALS TO BE MET BEFORE DISCHARGE:  1. ADLs back to baseline: Yes      2. Activity and level of assistance: total assist      3. Pain status: Pain free.      4. Return to near baseline physical activity: Yes          Pt is currently sleeping, BP soft, HR in the 40-50's.  Pt baseline mostly nonverbal, does answer some yes/no questions.Pt requires turn Q2h and assistance with feeding. Pt heavy assist x2-3 with turning/changing for incontinence care. VPM in room to monitor for seizure activity and Bed alarm on for safety. Will continue to monitor.     Discharge Planner Nurse   Safe discharge environment identified: Yes  Barriers to discharge: Yes       Entered by: Cassia Manrique 09/29/2017 4:57 PM     Please review provider order for any additional goals.   Nurse to notify provider when observation goals have been met and patient is ready for discharge.

## 2017-09-30 NOTE — PROGRESS NOTES
[facility]  Hospitalist Progress Note  Augusto Padilla MD, MD 09/30/2017  (Text Page)  Reason for Stay (Diagnosis): seizure events         Assessment and Plan:      Summary of Stay: Zahira Sutherland is a 67 year old female with known advance dementia (evaluated at [place] with early onset Alzheimer's) admitted on 9/28/2017 with reported episodes of seizures    Problem List:   1. 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 Hx of early onset Alzheimer's  3. Hx of DM (per medical chart) not on any medications  4. Hypertension    Continue inpatient care. Appreciate neuro input.  Started on IV medrol with 500 mg daily for 3 doses for possible immune mediated seizures with pending paraneoplastic panel.  Monitor glucose levels as anticipating increase levels with IV medrol on board.  Resume with prior donepezil, cymbalta, neurontin and zyrexa  Please secure [facility] records as well.    DVT Prophylaxis: Pneumatic Compression Devices  Code Status: Full Code  Discharge Dispo: back to LTC  Estimated Disch Date / # of Days until Disch: > 2 days        Interval History (Subjective):      Assumed care today.  Seen and examined  Zahira is awake, tolerating oral diet with baseline assistance with her daughter  No episodes of obvious seizure shakes.  No vomiting.   Mental state appears at baseline.  afebrile                  Physical Exam:      Last Vital Signs:  /58 (BP Location: Left arm)  Pulse 64  Temp 97.7  F (36.5  C) (Oral)  Resp 18  SpO2 91%    I/O last 3 completed shifts:  In: 814 [I.V.:814]  Out: -   Wt Readings from Last 1 Encounters:   09/19/17 114.4 kg (252 lb 4.8 oz)     There were no vitals filed for this visit.    Constitutional: Awake, alert,  no apparent distress   Respiratory: Clear to auscultation bilaterally, no crackles or wheezing   Cardiovascular: Regular rate and rhythm, normal S1 and S2, and no murmur noted   Abdomen: Normal bowel sounds, soft, non-distended, non-tender    Skin: No rashes, no cyanosis, dry to touch   Neuro: Alert, , no weakness, spontaneous and coherent speech   Extremities: No edema, normal range of motion   Other(s): Euthymic mood, not agitated       All other systems: Negative          Medications:      All current medications were reviewed with changes reflected in problem list.         Data:      All new lab and imaging data was reviewed.   Labs:  No results for input(s): CULT in the last 168 hours.    Recent Labs  Lab 09/28/17  0810   WBC 8.9   HGB 14.5   HCT 45.6   MCV 99          Recent Labs  Lab 09/29/17  0858 09/28/17  0819 09/28/17  0810     --  141   POTASSIUM 3.8  --  4.5   CHLORIDE 109  --  108   CO2 29  --  24   ANIONGAP 4  --  9   GLC 88  --  123*   BUN 15  --  19   CR 0.80  --  0.85   GFRESTIMATED 71 62 66   GFRESTBLACK 86 75 80   EVY 8.4*  --  9.2   MAG 2.3  --   --    PROTTOTAL 6.2*  --   --    ALBUMIN 2.8*  --   --    BILITOTAL 0.8  --   --    ALKPHOS 86  --   --    AST 13  --   --    ALT 24  --   --        Recent Labs  Lab 09/30/17  0913 09/30/17  0300 09/29/17  0858 09/29/17  0642 09/29/17  0530 09/28/17  0810   GLC  --   --  88  --   --  123*   * 155*  --  93 79  --      No results for input(s): INR in the last 168 hours.    Recent Labs  Lab 09/28/17  0855   COLOR Yady   APPEARANCE Slightly Cloudy   URINEGLC Negative   URINEBILI Negative   URINEKETONE Negative   SG 1.025   UBLD Negative   URINEPH 5.0   PROTEIN 100*   NITRITE Negative   LEUKEST Negative   RBCU 11*   WBCU 2      Imaging:   Results for orders placed or performed during the hospital encounter of 09/28/17   CT Head w/o Contrast    Narrative    CT OF THE HEAD WITHOUT CONTRAST  9/28/2017 9:42 AM     COMPARISON: Head CT 7/25/2011.    HISTORY: Seizure.    TECHNIQUE: 5 mm thick axial CT images of the head were acquired  without IV contrast material.    FINDINGS:  There is moderate diffuse cerebral volume loss. There are  subtle patchy areas of decreased density in  the cerebral white matter  bilaterally that are consistent with sequela of chronic small vessel  ischemic disease.     The ventricles and basal cisterns are within normal limits in  configuration given the degree of cerebral volume loss.  There is no  midline shift. There are no extra-axial fluid collections.     No intracranial hemorrhage, mass or recent infarct.    The visualized paranasal sinuses are well-aerated. There is no  mastoiditis. There are no fractures of the visualized bones.  Thickening and irregularity of the inner table of the left parietal  skull again noted without change. This area of thickening and  irregularity is adjacent to bony nodule on the inner table that could  represent a calcified meningioma. Therefore the adjacent thickening  could represent bony reaction to the meningioma.      Impression    IMPRESSION:  Diffuse cerebral volume loss and cerebral white matter  changes consistent with chronic small vessel ischemic disease. No  evidence for acute intracranial pathology.  No evidence for acute  intracranial pathology. No change from the comparison study.    Radiation dose for this scan was reduced using automated exposure  control, adjustment of the mA and/or kV according to patient size, or  iterative reconstruction technique.    PATEL QUINTEROS MD   Chest XR,  PA & LAT    Narrative    CHEST TWO VIEWS 9/28/2017 9:44 AM     HISTORY: Hypoxia    COMPARISON: None.    FINDINGS: There are no acute infiltrates. The cardiac silhouette is  not enlarged. Pulmonary vasculature is unremarkable.      Impression    IMPRESSION: No acute disease.    KENDRA DUARTE MD

## 2017-09-30 NOTE — PLAN OF CARE
Problem: Patient Care Overview  Goal: Plan of Care/Patient Progress Review  Outcome: No Change  Pt remains hospitalized for seizure.   Vital signs stable, bradycardic at times. On RA.   No signs of pain.   PIV saline locked, Solu-medrol.   Up with lift, assist x 2-3, repositioned/turned.   Incont of B&B x 2 this shift.  Redness to skin folds, under arms and breast, abdomen.   Pt sister, Niya will be here @ 1500.   VPM

## 2017-09-30 NOTE — PLAN OF CARE
Problem: Patient Care Overview  Goal: Plan of Care/Patient Progress Review  Outcome: No Change  Pt remains admitted for witnessed seizure. No seizure activity during shift. VPM in place. On Kepra and IV solumedrol. Incontinent of B/B x2. Assist x3 during incontinence care. Redness present underneath skin folds, armpits, and breasts, baby powder applied. Regular diet, total feed, found to pocket food in mouth. Plan to be on steroids for minimum of 3 days. Will continue to monitor.

## 2017-09-30 NOTE — PLAN OF CARE
"Problem: Patient Care Overview  Goal: Plan of Care/Patient Progress Review  Outcome: No Change  Pt remains hospitalized for potential seizure. Disoriented x4, speech garbled/illogical at times. Intermittently will be able to answer yes/no questions. Hr jerrica, otherwise VSS. On RA. Stated \"no\" when asked if pain, no non-verbal indicators present. EEG performed late this afternoon, per neurologist increased keppra and started on IV solumedrol. Pt has good appetite on regular diet, total feed. Meds whole in applesauce. Incontinent of bowel/bladder. Skin folds red/irritated   (abdomen, armpit, groin) with scant drainge. Pt refused letting writer wash them, allowed powder to be put on x1 skin fold. Lift (non-ambulatory) at baseline. Seizure precautions initiated. VPM in place. If neuro exams patient before Niya (sister, will be here around 3-4pm) is here she would like neurologist to call her for update. Sticky left for MD regarding possible WOC consult. Discharge TBD.      "

## 2017-09-30 NOTE — PROGRESS NOTES
EEG was reviewed and shows interictal changes with 2 episodes of short lasting rhythmic 1 0.5 to 1 hz sharp transients. Patient has electrical seizure activity with no clinical activity.  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 Paraneoplastic panel is pending.  I will recommending starting her on IV Solumedrol 500 mg qd for 3 days.  Increase Keppra to 1000mg bid.

## 2017-10-01 NOTE — PLAN OF CARE
Problem: Patient Care Overview  Goal: Plan of Care/Patient Progress Review  Outcome: No Change  VSS, afebrile. 2.5 L o2. No seizure activity noted this shift. Pt bedrest today due to agitation. Confused/combative. Speech illogical. Incontinent of bowel&bladder. Voiding adequately. Redness and moisture under breasts and in skin folds, cleansed and dried. PO cipro started today. VPM in place to watch for seizure activity. Will continue to monitor.

## 2017-10-01 NOTE — PLAN OF CARE
Problem: Patient Care Overview  Goal: Plan of Care/Patient Progress Review  Outcome: No Change  Pt remains hospitalized for witnessed seizure, no seizure activity this shift.   Bradycardic, other vital signs stable, no signs of pain.   Currently on 2.5 L NC.  PIV saline locked, continues solu-medrol.   Up with lift, assist x 2-3.   Incont of B&B, small BM this shift. Bladder scanned with varying amounts, straight cath for over 400mL, UA sent.

## 2017-10-01 NOTE — PROGRESS NOTES
[facility]  Hospitalist Progress Note  Augusto Padilla MD, MD 10/01/2017  (Text Page)  Reason for Stay (Diagnosis): seizure events         Assessment and Plan:      Summary of Stay: Zahira Sutherland is a 67 year old female with known advance dementia (evaluated at [facility] with early onset Alzheimer's) admitted on 9/28/2017 with reported episodes of seizures    Problem List:   1. 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 Hx of early onset Alzheimer's  3. Hx of DM (per medical chart) not on any medications  4. Hypertension    Continue inpatient care. Appreciate neuro input.  Started on IV medrol with 500 mg daily for 3 doses for possible immune mediated seizures with pending paraneoplastic panel.  Monitor glucose levels as anticipating increase levels with IV medrol on board.  Resume with prior donepezil, cymbalta, neurontin and zyrexa  Please secure [facility] records as well.    DVT Prophylaxis: Pneumatic Compression Devices  Code Status: Full Code  Discharge Dispo: back to LTC  Estimated Disch Date / # of Days until Disch: likely in 24-36 hours        Interval History (Subjective):      Continuing care today.  Seen and examined  Zahira is awake, tolerated oral diet with nursing assistance earlier.  No reported ongoing issues, no seizure like activity.  Remained at baseline mental state and afebrile.                    Physical Exam:      Last Vital Signs:  /59  Pulse 64  Temp 96.3  F (35.7  C) (Oral)  Resp 16  SpO2 92%    I/O last 3 completed shifts:  In: 480 [P.O.:480]  Out: 400 [Urine:400]  Wt Readings from Last 1 Encounters:   09/19/17 114.4 kg (252 lb 4.8 oz)     There were no vitals filed for this visit.    Constitutional: Awake, alert,  no apparent distress   Respiratory: Clear to auscultation bilaterally, no crackles or wheezing   Cardiovascular: Regular rate and rhythm, normal S1 and S2, and no murmur noted   Abdomen: Normal bowel sounds, soft, non-distended, non-tender    Skin: No rashes, no cyanosis, dry to touch   Neuro: Alert, , no weakness, spontaneous and coherent speech, not following simple verbal instructions   Extremities: No edema, normal range of motion   Other(s): Euthymic mood, not agitated       All other systems: Negative          Medications:      All current medications were reviewed with changes reflected in problem list.         Data:      All new lab and imaging data was reviewed.   Labs:    Recent Labs  Lab 10/01/17  0520   CULT PENDING       Recent Labs  Lab 09/28/17  0810   WBC 8.9   HGB 14.5   HCT 45.6   MCV 99          Recent Labs  Lab 09/29/17  0858 09/28/17  0819 09/28/17  0810     --  141   POTASSIUM 3.8  --  4.5   CHLORIDE 109  --  108   CO2 29  --  24   ANIONGAP 4  --  9   GLC 88  --  123*   BUN 15  --  19   CR 0.80  --  0.85   GFRESTIMATED 71 62 66   GFRESTBLACK 86 75 80   EVY 8.4*  --  9.2   MAG 2.3  --   --    PROTTOTAL 6.2*  --   --    ALBUMIN 2.8*  --   --    BILITOTAL 0.8  --   --    ALKPHOS 86  --   --    AST 13  --   --    ALT 24  --   --        Recent Labs  Lab 09/30/17  1905 09/30/17  0913 09/30/17  0300 09/29/17  0858 09/29/17  0642 09/29/17  0530 09/28/17  0810   GLC  --   --   --  88  --   --  123*   * 146* 155*  --  93 79  --      No results for input(s): INR in the last 168 hours.    Recent Labs  Lab 10/01/17  0520   COLOR Yellow   APPEARANCE Slightly Cloudy   URINEGLC Negative   URINEBILI Negative   URINEKETONE Negative   SG 1.010   UBLD Negative   URINEPH 6.0   PROTEIN Negative   NITRITE Negative   LEUKEST Moderate*   RBCU 2   WBCU 31*      Imaging:   Results for orders placed or performed during the hospital encounter of 09/28/17   CT Head w/o Contrast    Narrative    CT OF THE HEAD WITHOUT CONTRAST  9/28/2017 9:42 AM     COMPARISON: Head CT 7/25/2011.    HISTORY: Seizure.    TECHNIQUE: 5 mm thick axial CT images of the head were acquired  without IV contrast material.    FINDINGS:  There is moderate diffuse  cerebral volume loss. There are  subtle patchy areas of decreased density in the cerebral white matter  bilaterally that are consistent with sequela of chronic small vessel  ischemic disease.     The ventricles and basal cisterns are within normal limits in  configuration given the degree of cerebral volume loss.  There is no  midline shift. There are no extra-axial fluid collections.     No intracranial hemorrhage, mass or recent infarct.    The visualized paranasal sinuses are well-aerated. There is no  mastoiditis. There are no fractures of the visualized bones.  Thickening and irregularity of the inner table of the left parietal  skull again noted without change. This area of thickening and  irregularity is adjacent to bony nodule on the inner table that could  represent a calcified meningioma. Therefore the adjacent thickening  could represent bony reaction to the meningioma.      Impression    IMPRESSION:  Diffuse cerebral volume loss and cerebral white matter  changes consistent with chronic small vessel ischemic disease. No  evidence for acute intracranial pathology.  No evidence for acute  intracranial pathology. No change from the comparison study.    Radiation dose for this scan was reduced using automated exposure  control, adjustment of the mA and/or kV according to patient size, or  iterative reconstruction technique.    PATEL QUINTEROS MD   Chest XR,  PA & LAT    Narrative    CHEST TWO VIEWS 9/28/2017 9:44 AM     HISTORY: Hypoxia    COMPARISON: None.    FINDINGS: There are no acute infiltrates. The cardiac silhouette is  not enlarged. Pulmonary vasculature is unremarkable.      Impression    IMPRESSION: No acute disease.    KENDRA DUARTE MD

## 2017-10-01 NOTE — PLAN OF CARE
Problem: Patient Care Overview  Goal: Plan of Care/Patient Progress Review  Outcome: Improving  No further signs or symptoms of seizure activity. VPM in place. Speech mostly garbled and nonsensical. Total cares, feed, heavy assist of 3. Incontinent of bowel and bladder. Bedrest. Good appetite.

## 2017-10-01 NOTE — PROGRESS NOTES
Neurology Follow Up Note  [facility]       [2017]           Zahira Sutherland        67 year old yo female with dementia and observed seizure and abnormal EEG   Admission Date: 2017  Hospital Day: 4  Code Status: Full Code    S:  Pt remains confused. No observed seizure activity recently.    O:    BLOOD PRESSURE:   BP Readings from Last 3 Encounters:   10/01/17 110/59   17 105/71   09/15/17 99/60     PULSE: 64    Height:  Data Unavailable       Weight:  0 lbs 0 oz    Temperature: 96.3  Tmax 24 : Temp (24hrs), Av.3  F (36.3  C), Min:96.3  F (35.7  C), Max:98.4  F (36.9  C)    Vitals: Ranges  Temp:  [96.3  F (35.7  C)-98.4  F (36.9  C)] 96.3  F (35.7  C)  Heart Rate:  [46-68] 61  Resp:  [16] 16  BP: ()/(50-75) 110/59  SpO2:  [90 %-95 %] 92 %    Medications:    ciprofloxacin  250 mg Oral Q12H DAYAN     [START ON 10/2/2017] influenza Vac Split High-Dose  0.5 mL Intramuscular Prior to discharge     levETIRAcetam  1,000 mg Oral BID     methylPREDNISolone  500 mg Intravenous Q24H     sodium chloride (PF)  3 mL Intracatheter Q8H     aspirin  81 mg Oral Daily     donepezil (ARICEPT) tablet 10 mg  10 mg Oral Daily     DULoxetine (CYMBALTA) EC capsule 30 mg  30 mg Oral Daily     gabapentin (NEURONTIN) capsule 100 mg  100 mg Oral TID     OLANZapine (zyPREXA) tablet 2.5 mg  2.5 mg Oral BID         LABORATORY RESULTS       SMA-7:    Recent Labs  Lab 17  0858 17  0810    141   POTASSIUM 3.8 4.5   CHLORIDE 109 108   CO2 29 24   GLC 88 123*   BUN 15 19   CR 0.80 0.85     CMP:    Recent Labs  Lab 17  0858 17  0810   EVY 8.4* 9.2   MAG 2.3  --    PROTTOTAL 6.2*  --    ALBUMIN 2.8*  --    ALKPHOS 86  --    AST 13  --    ALT 24  --    BILITOTAL 0.8  --      CBC:      Recent Labs  Lab 17  0810   WBC 8.9   RBC 4.60   HGB 14.5   HCT 45.6   MCV 99          Recent Labs  Lab 17  0819   TROPONIN 0.00     U/A:    Recent Labs   Lab Test   10/01/17   0520  05/08/12   1440   COLOR  Yellow  Yellow   APPEARANCE  Slightly Cloudy  Clear   URINEGLC  Negative  Negative   URINEBILI  Negative  Negative   URINEKETONE  Negative  Negative   SG  1.010  1.020   UBLD  Negative  Small*   URINEPH  6.0  6.0   PROTEIN  Negative  Negative   UROBILINOGEN   --   1.0   NITRITE  Negative  Negative   LEUKEST  Moderate*  Small*   RBCU  2  5-10*   WBCU  31*  10-25*    < > = values in this interval not displayed.     HgA1c:   Hemoglobin A1C   Date Value Ref Range Status   05/31/2017 5.5 4.3 - 6.0 % Final   TSH:   Recent Labs  Lab 09/29/17  0858   TSH 2.49   Vitamin B12:   Recent Labs   Lab Test  05/31/17   0535  07/26/11   0610  06/23/11   1235   B12  595  317  446       Recent Labs  Lab 09/28/17  0819   TROPONIN 0.00     ABG:   Recent Labs  Lab 09/28/17  0810   O2PER 2     Blood Cultures:   Recent Labs  Lab 10/01/17  0520   CULT PENDING       Cholesterol Panel: Lab Results   Component Value Date    CHOL 221 (H) 08/15/2011    HDL 57 08/15/2011     (H) 08/15/2011    TRIG 136 08/15/2011    CHOLHDLRATIO 3.9 08/15/2011    VLDL 27 08/15/2011     Keppra  Level:  No lab results found.       Chest Xr,  Pa & Lat    Result Date: 9/28/2017  CHEST TWO VIEWS 9/28/2017 9:44 AM HISTORY: Hypoxia COMPARISON: None. FINDINGS: There are no acute infiltrates. The cardiac silhouette is not enlarged. Pulmonary vasculature is unremarkable.     IMPRESSION: No acute disease. KENDRA DUARTE MD    Ct Head W/o Contrast    Result Date: 9/28/2017  CT OF THE HEAD WITHOUT CONTRAST  9/28/2017 9:42 AM COMPARISON: Head CT 7/25/2011. HISTORY: Seizure. TECHNIQUE: 5 mm thick axial CT images of the head were acquired without IV contrast material. FINDINGS:  There is moderate diffuse cerebral volume loss. There are subtle patchy areas of decreased density in the cerebral white matter bilaterally that are consistent with sequela of chronic small vessel ischemic disease. The ventricles and basal cisterns are within  normal limits in configuration given the degree of cerebral volume loss.  There is no midline shift. There are no extra-axial fluid collections. No intracranial hemorrhage, mass or recent infarct. The visualized paranasal sinuses are well-aerated. There is no mastoiditis. There are no fractures of the visualized bones. Thickening and irregularity of the inner table of the left parietal skull again noted without change. This area of thickening and irregularity is adjacent to bony nodule on the inner table that could represent a calcified meningioma. Therefore the adjacent thickening could represent bony reaction to the meningioma.     IMPRESSION:  Diffuse cerebral volume loss and cerebral white matter changes consistent with chronic small vessel ischemic disease. No evidence for acute intracranial pathology.  No evidence for acute intracranial pathology. No change from the comparison study. Radiation dose for this scan was reduced using automated exposure control, adjustment of the mA and/or kV according to patient size, or iterative reconstruction technique. PATEL QUINTEROS MD        ASSESSMENT     1. Dementia.  2. Seizure, treated with Keppra. No recent level available.  3. 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    RECOMMENDATIONS   1. Continue IV solumedrol and Keppra.  2. I recommend obtaining a Keppra level in the morning.  3. Continue supportive care.        Vasquez Coelho M.D., Ph.D.    [facility]    [id number]  1950

## 2017-10-02 NOTE — PROGRESS NOTES
[facility] Nurse Inpatient Adult Pressure Injury (PI) Wound Assessment     Assessment of PI(s) on pt's:   Right Hip    Data:   Patient History:      per MD note(s):  67 year old female with known advance dementia (evaluated at [facility] with early onset Alzheimer's) admitted on 2017 with reported episodes of seizures     Current mattress:  Pulsate  Current pressure relieving devices:  Pillows        Current Diet / Nutrition:           Active Diet Order      Regular Diet Adult    Herman Assessment and sub scores:   Herman Score  Av.6  Min: 14  Max: 15   Labs:   Recent Labs   Lab Test  17   0858  17   0810  17   0535   ALBUMIN  2.8*   --    --    HGB   --   14.5  14.0   WBC   --   8.9  7.8   A1C   --    --   5.5                                                                                                                          Skin Injury Assessment  (location):   Abdomen/ axilla/ breast skin folds    Wound History:   See above    Specific Dimensions (length x width x depth, in cm) :   Very superficial erosion due to moisture to deep skin folds, pink dermis    Periwound Skin: intact,  Superficial fungal satellite lesions noted    Color: normal and consistent with surrounding tissue    Temperature  normal     Drainage:   Amount: scant,  Color: serous       Odor: none    Pain:  unable to assess , due to dementia, pt did not flinch or yell when examined         Intervention:     Patient's chart evaluated.      Herman Interventions:  Current Herman Interventions and Care Plan reviewed and updated, appropriate at this time.    Wound was assessed.    Wound Care: was done: Visual inspection    Application of topical critic aid paste to open areas,    Orders  Written    Supplies  Placed at Bedside    Discussed plan of care with Patient, Nurse and Physician           Assessment:     Pressure Injury noted on admission to right hip not assessed      Status: wound  initial assessment  to skin folds, erosion of epidermis with fungal rash, Stable; Asymptomatic         Plan:     Nursing to notify the Provider(s) and re-consult the WOC Nurse if wound(s) deteriorate(s)or if the wound care plan needs reevaluation.    Plan for wound care to wound located on skin folds: BID/ PRN    1. Cleanse with Steven and soft cloth to wipe and wash,    2. Lightly dust with Antifungal powder, wipe into skin and dust off excess    3. Apply light layer of Critic aid paste to open skin    WOC Nurse will return: completed; stable, please reconsult if new open issues occur  Face to face time: 25 min

## 2017-10-02 NOTE — PROGRESS NOTES
[facility]  Hospitalist Progress Note  Augusto Padilla MD, MD 10/02/2017  (Text Page)  Reason for Stay (Diagnosis): seizure events         Assessment and Plan:      Summary of Stay: Zahira Sutherland is a 67 year old female with known advance dementia (evaluated at [facility] with early onset Alzheimer's) admitted on 9/28/2017 with reported episodes of seizures    Problem List:   1. 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 Hx of early onset Alzheimer's  3. Hx of DM (per medical chart) not on any medications  4. Hypertension  5. UTI with growing E coli    Continue inpatient care. Appreciate neuro input.  Started on IV medrol with 500 mg daily for 3 doses for possible immune mediated seizures with pending paraneoplastic panel.  Will await further instructions/recommendations from neurology regarding seizures management  Monitor glucose levels as anticipating increase levels with IV medrol on board. Finish medrol dosing  Resume with prior donepezil, cymbalta, neurontin and zyrexa  Please secure [facility] records as well.  Her R maxillary and left submandibular area appears erythematous,  but no tenderness. Remained afebrile. Change cipro to rocephin    DVT Prophylaxis: Pneumatic Compression Devices  Code Status: Full Code  Discharge Dispo: back to LTC  Estimated Disch Date / # of Days until Disch: likely in 24-36 hours        Interval History (Subjective):      Continuing care today.  Seen and examined  Zahira is awake, redirectable, appears comfortable and has no ongoing complaints.                  Physical Exam:      Last Vital Signs:  /54 (BP Location: Left arm)  Pulse 64  Temp 96.8  F (36  C) (Oral)  Resp 16  SpO2 92%    I/O last 3 completed shifts:  In: 2963 [P.O.:1540; I.V.:1423]  Out: -   Wt Readings from Last 1 Encounters:   09/19/17 114.4 kg (252 lb 4.8 oz)     There were no vitals filed for this visit.    Constitutional: Awake, alert,  no apparent distress   Respiratory: Clear to  auscultation bilaterally, no crackles or wheezing   Cardiovascular: Regular rate and rhythm, normal S1 and S2, and no murmur noted   Abdomen: Normal bowel sounds, soft, non-distended, non-tender   Skin: Erythema seen on the R maxillary area, L submandibular area, no warmth, non tender   Neuro: Alert, , no weakness, spontaneous and coherent speech, not following simple verbal instructions   Extremities: No edema, normal range of motion   Other(s): Euthymic mood, not agitated       All other systems: Negative          Medications:      All current medications were reviewed with changes reflected in problem list.         Data:      All new lab and imaging data was reviewed.   Labs:    Recent Labs  Lab 10/01/17  0520   CULT >100,000 colonies/mLEscherichia coliSusceptibility testing in progress*       Recent Labs  Lab 09/28/17  0810   WBC 8.9   HGB 14.5   HCT 45.6   MCV 99          Recent Labs  Lab 09/29/17  0858 09/28/17  0819 09/28/17  0810     --  141   POTASSIUM 3.8  --  4.5   CHLORIDE 109  --  108   CO2 29  --  24   ANIONGAP 4  --  9   GLC 88  --  123*   BUN 15  --  19   CR 0.80  --  0.85   GFRESTIMATED 71 62 66   GFRESTBLACK 86 75 80   EVY 8.4*  --  9.2   MAG 2.3  --   --    PROTTOTAL 6.2*  --   --    ALBUMIN 2.8*  --   --    BILITOTAL 0.8  --   --    ALKPHOS 86  --   --    AST 13  --   --    ALT 24  --   --        Recent Labs  Lab 10/01/17  2008 09/30/17  1905 09/30/17  0913 09/30/17  0300 09/29/17  0858 09/29/17  0642  09/28/17  0810   GLC  --   --   --   --  88  --   --  123*   * 119* 146* 155*  --  93  < >  --    < > = values in this interval not displayed.  No results for input(s): INR in the last 168 hours.    Recent Labs  Lab 10/01/17  0520   COLOR Yellow   APPEARANCE Slightly Cloudy   URINEGLC Negative   URINEBILI Negative   URINEKETONE Negative   SG 1.010   UBLD Negative   URINEPH 6.0   PROTEIN Negative   NITRITE Negative   LEUKEST Moderate*   RBCU 2   WBCU 31*      Imaging:   Results  for orders placed or performed during the hospital encounter of 09/28/17   CT Head w/o Contrast    Narrative    CT OF THE HEAD WITHOUT CONTRAST  9/28/2017 9:42 AM     COMPARISON: Head CT 7/25/2011.    HISTORY: Seizure.    TECHNIQUE: 5 mm thick axial CT images of the head were acquired  without IV contrast material.    FINDINGS:  There is moderate diffuse cerebral volume loss. There are  subtle patchy areas of decreased density in the cerebral white matter  bilaterally that are consistent with sequela of chronic small vessel  ischemic disease.     The ventricles and basal cisterns are within normal limits in  configuration given the degree of cerebral volume loss.  There is no  midline shift. There are no extra-axial fluid collections.     No intracranial hemorrhage, mass or recent infarct.    The visualized paranasal sinuses are well-aerated. There is no  mastoiditis. There are no fractures of the visualized bones.  Thickening and irregularity of the inner table of the left parietal  skull again noted without change. This area of thickening and  irregularity is adjacent to bony nodule on the inner table that could  represent a calcified meningioma. Therefore the adjacent thickening  could represent bony reaction to the meningioma.      Impression    IMPRESSION:  Diffuse cerebral volume loss and cerebral white matter  changes consistent with chronic small vessel ischemic disease. No  evidence for acute intracranial pathology.  No evidence for acute  intracranial pathology. No change from the comparison study.    Radiation dose for this scan was reduced using automated exposure  control, adjustment of the mA and/or kV according to patient size, or  iterative reconstruction technique.    PATEL QUINTEROS MD   Chest XR,  PA & LAT    Narrative    CHEST TWO VIEWS 9/28/2017 9:44 AM     HISTORY: Hypoxia    COMPARISON: None.    FINDINGS: There are no acute infiltrates. The cardiac silhouette is  not enlarged. Pulmonary  vasculature is unremarkable.      Impression    IMPRESSION: No acute disease.    KENDRA DUARTE MD

## 2017-10-02 NOTE — PLAN OF CARE
Problem: Patient Care Overview  Goal: Plan of Care/Patient Progress Review  Outcome: No Change  Pt remains hospitalized for: Seizures   Ambulatory Status:  Pt up lift. On speciality mattress   Orientation: disorientated x4  VS:  [place] at times   Pain:  No visible signs of pain   Resp: LS dim.   GI:  good appetite, on regular diet. +BS.  Passing flatus.  Last BM 10/2-incontinent of stool.-pt able to feed self with finger foods   :  Incontinent   Skin:  Abd,and breast fold red, powder applied. Erythema noted on face.   Tx:  Rocephin   Consults:  WOC  Disposition:  tbd

## 2017-10-02 NOTE — PROCEDURES
ELECTROENCEPHALOGRAM   ORDERING PHYSICIAN: Dr. Morris Gutierrez  EEG#   Location:     Test Type: portable EEG   CONDITION OF PATIENT: Patient awake, very restless with frequent eye opening, moving feet and hands and mumbling. Patient unable to follow command to close eyes and other commands. Unable to perform the HV or photic protocol due to patient inability to perform task. Patient needed frequent redirecting. RN was unaware of handedness.   REASON FOR TEST: Staff at [facility] ([place] witnessed abnormal responsiveness and bleeding from mouth. EMS was called and was reported to that patient had seizure like activity.   HISTORY: dementia, diabetes and hypertension   MEDICATIONS: aspirin, Aricept, Cymbalta, Neurontin, Keppra, Zyprexa, Zofran   CONCLUSION:  This is an 18-channel routine EEG with one channel of EKG.  The patient is mumbling and not able to follow any commands.  The photic stimulation and hyperventilation were not performed during this recording.   The EEG shows diffuse background slowing in the 6-7 Hz range.  The EEG shows frontal intermittent rhythmic delta activity.  There are episodes of intermittent sharp transients in the anterior leads bilaterally.  The episodes of a 1 Hz spike and wave discharges lasting 5 to 10 seconds with no associated clinical activity.  The EKG showed normal sinus rhythm of 64 beats per minute.   IMPRESSION: This is an abnormal electroencephalographic study showing diffuse slowing with intermittent electrical seizure activity.  There are significant interictal changes during this recording.  The patient may have underlying generalized tonic-clonic seizures.  Clinical correlation is recommended.         AMANDA LAMBERT MD             D: 2017 19:01   T: 10/02/2017 10:17   MT:       Name:     VILMA MARTINEZ   MRN:      [id number]        Account:        [id number]   :      1950           Procedure Date: 2017      Document: [id number]       cc: Brianna  Zak SUAZO CNP

## 2017-10-02 NOTE — PLAN OF CARE
Problem: Patient Care Overview  Goal: Plan of Care/Patient Progress Review  Outcome: Improving  No complaints of pain or nausea. Lift or roll with 3-4,  total cares, feed. Good appetite. Incontinent of bowel and bladder.  VPM for possible seizure activity.

## 2017-10-02 NOTE — PLAN OF CARE
Problem: SLP General Care Plan  Goal: Swallow Goal: Safely tolerate diet without aspiration (SLP)  Safely tolerate diet without signs/symptoms of aspiration  Outcome: Therapy, progress towards functional goals is fair  Discharge Planner SLP      Patient plan for discharge: Unstated.           Current status: Clinical swallow evaluation completed: PT presents with mild oropharyngeal dysphagia with increased risk of aspiration if lethargic or improperly positioned.  Recommend continue regular diet with thin liquids with 1:1 feeding assistance for use of upright positioning and small bites/sips.    Barriers to return to prior living situation: Weakness, lethargy.   Recommendations for discharge: Return to previous care center.   Rationale for recommendations: Skilled services and rehab intervention indicated.        Entered by: Dnaielle Min 10/02/2017 3:08 PM

## 2017-10-02 NOTE — PROGRESS NOTES
[facility]  Neurology Progress Note               Interval History:   Patient is alert and follows some commands.  She was noted to have UTI, culture positive for E. Coli.              Medications:   I have reviewed this patient's current medications               Physical Exam:   Blood pressure 103/54, pulse 64, temperature 97.7  F (36.5  C), temperature source Axillary, resp. rate 16, SpO2 94 %.  Awake, alert. Follows simple commands.  Not oriented to time or place.  Less movement in right arm and leg as compared to left.         Data:   Recent labs, imaging, and other studies were reviewed.         Lab Results   Component Value Date     09/29/2017    Lab Results   Component Value Date    CHLORIDE 109 09/29/2017    Lab Results   Component Value Date    BUN 15 09/29/2017      Lab Results   Component Value Date    POTASSIUM 3.8 09/29/2017    Lab Results   Component Value Date    CO2 29 09/29/2017    Lab Results   Component Value Date    CR 0.80 09/29/2017        Lab Results   Component Value Date    WBC 8.9 09/28/2017    HGB 14.5 09/28/2017    HCT 45.6 09/28/2017    MCV 99 09/28/2017     09/28/2017     No results found for: INR         Assessment and Plan:   Advanced dementia, UTI and recent seizure  Paraneoplastic panel pending.  IV solumedrol completed.   Manage UTI.  Continue keppra for seizures.  Follow up in clinic in 2 weeks.

## 2017-10-02 NOTE — PLAN OF CARE
Problem: Patient Care Overview  Goal: Plan of Care/Patient Progress Review  Outcome: No Change  Pt remains hospitalized for witnessed seizure.  No seizure activity this shift.   Vital signs stable, no signs of pain. Currently on 1L NC.   Up with lift, assist x 2-3.   IVF 75/hr. Started on oral Cipro.   WOC to see today.   Incont of B&B x 1 this shift.   VPM to monitor for seizure activity.

## 2017-10-02 NOTE — PROGRESS NOTES
CLINICAL SWALLOW EVALUATION       10/02/17 1400   General Information   Onset Date 09/28/17   Start of Care Date 10/02/17   Referring Physician Dr. Padilla   Patient Profile Review/OT: Additional Occupational Profile Info See Profile for full history and prior level of function   Swallowing Evaluation Bedside swallow evaluation   Behaviorial Observations Confused   Mode of current nutrition Oral diet   Type of oral diet Thin liquid;Regular   Respiratory Status Room air   Comments PT admitted 9/28/17 with seizures.  PMH is significant for early onset Alzheimer's.     Clinical Swallow Evaluation   Oral Musculature unable to assess due to poor participation/comprehension   Structural Abnormalities none present   Dentition present and adequate   Mucosal Quality good   Oral Musculature Comments Unable to assess lip and tongue function.    Additional Documentation Yes   Additional evaluation(s) completed today No   Clinical Swallow Eval: Thin Liquid Texture Trial   Mode of Presentation, Thin Liquids cup;straw;self-fed;fed by clinician   Volume of Liquid or Food Presented 4 oz   Oral Phase of Swallow Poor AP movement   Pharyngeal Phase of Swallow intact   Diagnostic Statement Mild to moderate delay in swallow initiation   Clinical Swallow Eval: Puree Solid Texture Trial   Mode of Presentation, Puree spoon;self-fed   Volume of Puree Presented 3 oz   Oral Phase, Puree WFL   Pharyngeal Phase, Puree intact   Diagnostic Statement No overt Sx of aspiration   Clinical Swallow Eval: Solid Food Texture Trial   Mode of Presentation, Solid self-fed   Volume of Solid Food Presented 3 crackers   Oral Phase, Solid WFL   Pharyngeal Phase, Solid intact   Diagnostic Statement Functional oral clearance   Swallow Compensations   Swallow Compensations Pacing;Reduce amounts  (Feeding assistance)   Swallow Eval: Clinical Impressions   Skilled Criteria for Therapy Intervention Skilled criteria met.  Treatment indicated.   Functional Assessment  Scale (FAS) 5   Dysphagia Outcome Severity Scale (COURT) Level 5 - COURT   Treatment Diagnosis Mild oropharyngeal dysphagia   Diet texture recommendations Regular diet;Thin liquids   Recommended Feeding/Eating Techniques maintain upright posture during/after eating for 30 mins;small sips/bites;other (see comments)  (1:1 feeding assistance)   Demonstrates Need for Referral to Another Service occupational therapy;physical therapy   Therapy Frequency 5 times/wk   Predicted Duration of Therapy Intervention (days/wks) 1 week   Anticipated Discharge Disposition extended care facility   Risks and Benefits of Treatment have been explained. Yes   Patient, family and/or staff in agreement with Plan of Care Yes   Clinical Impression Comments PT presents with mild oropharyngeal dysphagia characterized by delayed swallow initiation and risk of premature pharyngeal entry, but without overt Sx of aspiration for thin, puree, and solid PO trials this afternoon when alert and upright.  PT required 1:1 assistance for feeding, small sips via cup and straw with guidance from feeder.  Recommend SLP follow up to ensure diet tolerance across a meal.     Total Evaluation Time   Total Evaluation Time (Minutes) 25

## 2017-10-03 NOTE — PROGRESS NOTES
Pt discharged to [facility] with medical transport. Sent with AVS/packet + all personal belongings.

## 2017-10-03 NOTE — PLAN OF CARE
Problem: Patient Care Overview  Goal: Plan of Care/Patient Progress Review  Outcome: Improving  Patient lethargic and  But easily arousable . Repositioned q 2hrs, VSS except HR 45. Patient did not void all evening. Bladder scanned for 151 cc at 9 pm. IV fluids infusing at 50 ml/hr. Still no void at this time. Bladder scanned for 251 cc. E-page hospitalist. No seizure activities noted. VPM monitor on. Powder applied to abdominal folds.

## 2017-10-03 NOTE — DISCHARGE SUMMARY
[facility]  Discharge Summary  Name: Zahira Sutherland    MRN: [id number]  YOB: 1950    Age: 67 year old  Date of Discharge:  10/3/2017  Date of Admission: 9/28/2017  Primary Care Provider: Brianna Crespo  Discharge Physician:  Augusto Padilla MD  Discharging Service:  Hospitalist      Discharge Diagnosis:  1. 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 Hx of early onset Alzheimer's  3. Hx of DM (per medical chart) not on any medications  4. Hypertension  5. UTI with growing E coli     Other Diagnosis:  Past Medical History:   Diagnosis Date     Dementia      Diabetes      Hypertension           Discharge Disposition:  Discharged to home     Allergies:  Allergies   Allergen Reactions     Penicillins         Discharge Medications:   Current Discharge Medication List      START taking these medications    Details   levETIRAcetam 1000 MG TABS Take 1,000 mg by mouth 2 times daily  Qty: 60 tablet, Refills: 0    Associated Diagnoses: Grand mal seizure (H)      ciprofloxacin (CIPRO) 500 MG tablet Take 1 tablet (500 mg) by mouth 2 times daily for 5 days  Qty: 10 tablet, Refills: 0    Associated Diagnoses: Dysuria         CONTINUE these medications which have NOT CHANGED    Details   DONEPEZIL HCL PO Take 10 mg by mouth daily      aspirin 81 MG chewable tablet Take 81 mg by mouth daily      GABAPENTIN PO Take 100 mg by mouth 3 times daily      nystatin (MYCOSTATIN) 908250 UNIT/GM POWD Apply topically 2 times daily Twice daily to red areas.      OLANZAPINE PO Take 2.5 mg by mouth 2 times daily      DULOXETINE HCL PO Take 30 mg by mouth daily      cholecalciferol 2000 UNITS CAPS Take 1 capsule by mouth daily      Multiple Vitamin (MULTIVITAMIN) per tablet Take 1 tablet by mouth daily.  Qty: 90 tablet, Refills: 3    Associated Diagnoses: Hypertension goal BP (blood pressure) < 130/80         STOP taking these medications       lisinopril (PRINIVIL,ZESTRIL) 10 MG tablet  "Comments:   Reason for Stopping:  BP levels were soft even with out the ACEi on board               Condition on Discharge:  Discharge condition: Stable   Discharge vitals: Blood pressure 117/63, pulse 64, temperature 97.8  F (36.6  C), temperature source Axillary, resp. rate 14, height 1.753 m (5' 9\"), weight 118 kg (260 lb 2.3 oz), SpO2 93 %.   Code status on discharge: Full Code     History of Present Illness:  See detailed admission note for full details.        Significant Physical Exam Findings Day of Discharge:  HEENT; Atraumatic, normocephalic, pinkish conjuctiva, pupils bilateral reactive , facial redness noted yesterday has resolved  Skin: warm and moist, no rashes  Lungs: equal chest expansion, clear to auscultation, no wheezes, no stridor, no crackles,   Heart: normal rate, normal rhythm, no rubs or gallops.   Abdomen: normal bowel sounds, no tenderness, no peritoneal signs, no guarding  Extremities: no deformities, no edema   Neuro; follow commands, alert and oriented x3, spontaneous speech, coherent, moves all extremities spontaneously  Psych; no hallucination, euthymic mood, not agitated    Procedures other than Imaging:  none     Imaging:  No results found for this or any previous visit (from the past 48 hour(s)).     Consultations:  Consultation during this admission received from neurology.     Recent Lab Results:    Recent Labs  Lab 09/28/17  0810   WBC 8.9   HGB 14.5   HCT 45.6   MCV 99          Recent Labs  Lab 10/01/17  0520   CULT >100,000 colonies/mLEscherichia coli*       Recent Labs  Lab 09/29/17  0858 09/28/17  0819 09/28/17  0810     --  141   POTASSIUM 3.8  --  4.5   CHLORIDE 109  --  108   CO2 29  --  24   ANIONGAP 4  --  9   GLC 88  --  123*   BUN 15  --  19   CR 0.80  --  0.85   GFRESTIMATED 71 62 66   GFRESTBLACK 86 75 80   EVY 8.4*  --  9.2   MAG 2.3  --   --    PROTTOTAL 6.2*  --   --    ALBUMIN 2.8*  --   --    BILITOTAL 0.8  --   --    ALKPHOS 86  --   --    AST 13  " --   --    ALT 24  --   --        Recent Labs  Lab 10/03/17  1006 10/03/17  0803 10/03/17  0631 10/02/17  2022 10/01/17  2008  09/29/17  0858  09/28/17  0810   GLC  --   --   --   --   --   --  88  --  123*   BGM 88 74 88 130* 100*  < >  --   < >  --    < > = values in this interval not displayed.    Recent Labs  Lab 09/28/17  0953   LACT 2.0       Recent Labs  Lab 09/28/17  0819   TROPONIN 0.00       Recent Labs  Lab 10/01/17  0520   COLOR Yellow   APPEARANCE Slightly Cloudy   URINEGLC Negative   URINEBILI Negative   URINEKETONE Negative   SG 1.010   UBLD Negative   URINEPH 6.0   PROTEIN Negative   NITRITE Negative   LEUKEST Moderate*   RBCU 2   WBCU 31*          Pending Results:    Unresulted Labs Ordered in the Past 30 Days of this Admission     Date and Time Order Name Status Description    9/28/2017 1024 Paraneoplastic Antibodies with Reflex In process            Discharge Instructions and Follow-Up:   Discharge diet:   Active Diet Order      Regular Diet Adult      Diet   Discharge activity: Activity as tolerated   Discharge follow-up: 1-2 weeks with PCP, with neurology as scheduled with pending paraneoplastic panel   Outpatient therapy: None    Other instructions: None      Hospital Course:  Summary of Stay: Zahira Sutherland is a 67 year old female with known advance dementia (evaluated at [facility] with early onset Alzheimer's) admitted on 9/28/2017 with reported episodes of seizures     Problem List:   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 Earlier EEG showed seizure activity and suspected with immune mediated seizures and did well with 3 doses of medrol IV 500mg. Blood glucose showed normal/optimal levels.  Currently she is at her baseline. She is tolerating her oral diet. No reported fever, diarrhea, abdominal pain, vomiting.   Continued on oral  antibiotics for her UTI with isolated E coli  Resume with prior donepezil, cymbalta, neurontin and zyrexa  Please secure [place] records as well. Request sent but did not receive any records  Her R maxillary and left submandibular area appears erythematous earlier but resolved and no signs of infection. Remained afebrile.     Total time spent in face to face contact with the patient and coordinating discharge was:  > 30 Minutes.

## 2017-10-03 NOTE — DISCHARGE INSTRUCTIONS
[facility] -Follow up within 2 weeks.  [facility] [address], [address]  [place] [address]  DIRECTIONS  Neurology and Neurodiagnostic Testing: [phone]  Office Fax: [phone]  Rehabilitation: [phone]  Rehabilitation Fax: [phone]

## 2017-10-03 NOTE — PLAN OF CARE
Problem: Patient Care Overview  Goal: Plan of Care/Patient Progress Review  ST session cancelled. Attempted to see pt for dysphagia tx, however pt receiving nursing cares at time of attempt. Per chart review, pt to d/c to TCU today. Would recommend follow up ST upon d/c for dysphagia.     Speech Language Therapy Discharge Summary    Reason for therapy discharge:    Discharged to transitional care facility.    Progress towards therapy goal(s). See goals on Care Plan in [facility] electronic health record for goal details.  Goals partially met.  Barriers to achieving goals:   discharge from facility.    Therapy recommendation(s):    Continued therapy is recommended.  Rationale/Recommendations:  Continued ST for dysphagia.

## 2017-10-03 NOTE — PLAN OF CARE
Problem: Patient Care Overview  Goal: Plan of Care/Patient Progress Review  Outcome: Improving  No complaints of pain or nausea. Will feed herself finger foods upright in chair for meals. 1:1 supervision for meals. 2 hours in chair.  VPM to monitor for seizure activity, with no apparent seizures. Dry this shift, bladder scan for 151 ml. Ate most of dinner, but refused to drink fluids.

## 2017-10-03 NOTE — PLAN OF CARE
Problem: Patient Care Overview  Goal: Plan of Care/Patient Progress Review  Pt alert but disoriented. Illogical speech. Plans for discharge to [facility] via wheel chair at 1545. Turn and repo as tolerated per patient. Voiding.

## 2017-10-03 NOTE — PROGRESS NOTES
Pt is ready to dc back to [facility].  Dtg Rony was updated on Dc planning.  She is agreeable that I set up WC transport.  She is aware of the private cost.  HE WC is at 1700.   She is aware she needs to schedule f/u with Dr Phoenix within 2 weeks after dc.  Dc orders faxed to [facility].  [facility] notified of return.  Daniella CORONA CTS 1979

## 2017-10-04 PROBLEM — R00.1 BRADYCARDIA: Status: ACTIVE | Noted: 2017-01-01

## 2017-10-04 NOTE — PROGRESS NOTES
[facility]  PRIMARY CARE PROVIDER AND CLINIC:  Brianna Crespo [address]  [address] / [place] [address]  Chief Complaint   Patient presents with     Hospital F/U       HPI:    Zahira Sutherland is a 67 year old  (1950),admitted to [facility] from [facility].  Hospital stay 9/28/2017 through 10/3/2017.  Admitted to this facility for  rehab, medical management and nursing care.  HPI information obtained from: facility chart records, facility staff, patient report and [facility] chart review.    Current issues are:      Seizure (H)  New seizures> was admitted to [facility] on 9- for tx    Hypertension goal BP (blood pressure) < 130/80  BPs have been low ~ 110/70    Bradycardia  AP 48 RRR this am, was also low at times during the hospital stay  I wonder if this is a side effect to the Aricept    Early onset Alzheimer's disease with behavioral disturbance> was in [facility] spring 2016  She is not able to express her needs  Is essentially non verbal and dependent for all ADLs    Word finding difficulty  She is not able to express her needs  Is essentially non verbal and dependent for all ADLs    BMI 40.0-44.9, adult (H)  She is over weight but has low protein levels    UTI> she was started on Cipro during the hospital stay  Has ecoli UTI      CODE STATUS/ADVANCE DIRECTIVES DISCUSSION:   CPR/Full code   Patient's living condition: lives in a residential care facility    ALLERGIES:Penicillins  PAST MEDICAL HISTORY:  has a past medical history of Dementia; Diabetes; and Hypertension.  PAST SURGICAL HISTORY:  has a past surgical history that includes surgical history of - (1980s); gastric bypass (1992); Cholecystectomy; and back surgery.  FAMILY HISTORY: family history includes Breast Cancer in her mother; CEREBROVASCULAR DISEASE in her mother; Cancer - colorectal in her father; DIABETES in her father; HEART DISEASE  "in her mother; Hypertension in her sister; Obesity in her brother, father, mother, and sister; Other Cancer in her father.  SOCIAL HISTORY:  reports that she has never smoked. She has never used smokeless tobacco. She reports that she does not drink alcohol or use illicit drugs.    Post Discharge Medication Reconciliation Status: discharge medications reconciled and changed, per note/orders (see AVS).  Current Outpatient Prescriptions   Medication Sig Dispense Refill     levETIRAcetam 1000 MG TABS Take 1,000 mg by mouth 2 times daily (Patient taking differently: Take 1,000 mg by mouth every 12 hours ) 60 tablet 0     ciprofloxacin (CIPRO) 500 MG tablet Take 1 tablet (500 mg) by mouth 2 times daily for 5 days 10 tablet 0     aspirin 81 MG chewable tablet Take 81 mg by mouth daily       GABAPENTIN PO Take 100 mg by mouth 3 times daily       nystatin (MYCOSTATIN) 555131 UNIT/GM POWD Apply topically 2 times daily Twice daily to red areas.       OLANZAPINE PO Take 2.5 mg by mouth 2 times daily       DULOXETINE HCL PO Take 30 mg by mouth daily       cholecalciferol 2000 UNITS CAPS Take 1 capsule by mouth daily       Multiple Vitamin (MULTIVITAMIN) per tablet Take 1 tablet by mouth daily. 90 tablet 3       ROS:  Unobtainable secondary to cognitive impairment or aphasia.    Exam:  /72  Pulse (!) 49  Temp 97.6  F (36.4  C)  Resp 16  Ht 5' 6\" (1.676 m)  Wt 252 lb 14.4 oz (114.7 kg)  SpO2 95%  BMI 40.82 kg/m2  GENERAL APPEARANCE:  Alert, cooperative  ENT:  Mouth and posterior oropharynx normal, moist mucous membranes  EYES:  EOM, conjunctivae, lids, pupils and irises normal, nice eye contact today  RESP:  respiratory effort and palpation of chest normal, lungs clear to auscultation , no respiratory distress  CV:  Palpation and auscultation of heart done , no edema, bradycardic AP ~ 48 RRR  ABDOMEN:  normal bowel sounds, soft, nontender, no hepatosplenomegaly or other masses  M/S:   Gait and station abnormal > " resting in bed> Depends on staff for transfers, is transported by staff with a w/c  SKIN:  Soft and normal temp  NEURO:   Cranial nerves 2-12 are normal tested and grossly at patient's baseline, no evidence of seizure activity during my visit  PSYCH:  Not able to assess since she is non verbal     BP Range: /51-72 mmHg    Lab/Diagnostic data:  CBC RESULTS:   Recent Labs   Lab Test  10/04/17   0305  09/28/17   0810   WBC  10.7  8.9   RBC  4.12  4.60   HGB  13.0  14.5   HCT  40.7  45.6   MCV  99  99   MCH  31.6  31.5   MCHC  31.9  31.8   RDW  13.9  13.6   PLT  151  179       Last Basic Metabolic Panel:  Recent Labs   Lab Test  10/04/17   0305  09/29/17   0858   NA  141  142   POTASSIUM  5.0  3.8   CHLORIDE  111*  109   EVY  8.0*  8.4*   CO2  30  29   BUN  27  15   CR  0.78  0.80   GLC  82  88       Liver Function Studies -   Recent Labs   Lab Test  10/04/17   0305  09/29/17   0858   PROTTOTAL  6.0*  6.2*   ALBUMIN  2.5*  2.8*   BILITOTAL  0.4  0.8   ALKPHOS  69  86   AST  41  13   ALT  48  24       TSH   Date Value Ref Range Status   09/29/2017 2.49 0.40 - 4.00 mU/L Final   05/31/2017 3.89 0.40 - 4.00 mU/L Final     Lab Results   Component Value Date    A1C 5.5 05/31/2017    A1C 5.9 08/15/2011       ASSESSMENT/PLAN:  Seizure (H)  She is now on Keppra 1000 mg po bid> I wrote an order for q 12 hrs  I also will obtain a Keppra level on 10-5-2017  I will communicate with neurologist if labs are abnormal    Hypertension goal BP (blood pressure) < 130/80  Is off Lisinopril now  No new tx    Bradycardia  I did review meds and wonder if the Aricept is the culprit  I did talk with her dtr who gave me the ok to d/c the Aricept  This will start on 10-5-2017    Early onset Alzheimer's disease with behavioral disturbance> was in [facility]  I did review meds and wonder if the Aricept is the culprit  I did talk with her dtr who gave me the ok to d/c the Aricept  This will start on 10-5-2017  No change in any  of the other meds at this time    Word finding difficulty  Will use simple sentences  Will have eye contact when talking with her    BMI 40.0-44.9, adult (H)  She eats food prepared in LTC  She is not able to exercise  She also had low albumin and protein level  Dietary will address that    Urinary tract infection without hematuria, site unspecified  She does have a UTI with ecoli  She will finish the current Cipro     Orders:  See A&P    Total time spent with patient visit at the skilled nursing facility was 40 min including patient visit, review of past records and phone call to patient contact. Greater than 50% of total time spent with counseling and coordinating care due to she has complex care needs    Electronically signed by:  GABRIELE Brooks CNP

## 2017-10-04 NOTE — ED NOTES
Pt non-responsive to staff at [facility] home since 10pm this evening.  Non-verbal but awake with EMS and on arrival here.  New pt to staff at facility so unsure on baseline mental status.   by EMS.  Pt does track movements and voice of this RN, able to nod when asked questions.

## 2017-10-04 NOTE — ED AVS SNAPSHOT
[facility] Emergency Department    [address] [place] [address]    Phone:  [phone]    Fax:  [phone]                                       Zahira Sutherland   MRN: [id number]    Department:  [facility] Emergency Department   Date of Visit:  10/4/2017           Patient Information     Date Of Birth          1950        Your diagnoses for this visit were:     Altered mental status, unspecified altered mental status type        You were seen by Prabhakar Guerrero MD.      Follow-up Information     Follow up with Brianna Crespo APRN CNP. Call in 2 days.    Specialty:  Nurse Practitioner - Gerontology    Contact information:    [address]  [address]  [facility] [address]  [phone]          Discharge Instructions         Altered Level of Consciousness  Level of consciousness (LOC) is a measure of a person s ability to interact with other people and to react to the surroundings. A person with an altered level of consciousness may not respond to touch or voices. He or she may look vacant or blank and may not make eye contact with others. He or she may be limp and may not move for a long time or show little interest in moving. He or she may also be confused.  There are many causes of altered LOC. They include low blood sugar, infection, medicines, injuries, or other medical problems.  Altered LOC is a medical emergency. The healthcare provider will do tests to help find the cause. These may include blood tests and imaging tests. The person is treated so breathing and heart rate are stable. An intravenous (IV) line may be put into a vein in the arm or hand to give medicines. Once the cause of altered LOC is found, the goal is to treat the cause.  In almost all cases, the person will be admitted to the hospital for observation.  Home care  When your loved one is released from the hospital, you will be given guidelines for caring for him or her. In  general:    Follow the healthcare provider's instructions for giving any prescribed medicines to your child.    Stay with your loved one or have another responsible adult look after him or her. Watch carefully for any return of symptoms or changes in behavior.    If the person has diabetes, make sure that any approved medicines are given on time and as prescribed.  Follow-up care  Follow up with the healthcare provider or our staff.  When to seek medical advice  Call the healthcare provider right away for any of the following:    Symptoms of altered LOC return    New symptoms appear  Date Last Reviewed: 8/23/2015 2000-2017 [employer]. [address] [place], [place] [address]. All rights reserved. This information is not intended as a substitute for professional medical care. Always follow your healthcare professional's instructions.          Future Appointments        Provider Department Dept Phone Center    10/4/2017 5:30 AM SUZANNE Rodriguez [facility] Speech Therapy [phone] [place] [facility]    10/4/2017 1:30 PM GABRIELE Brooks CNP [facility] [phone] [facility]      24 Hour Appointment Hotline       To make an appointment at any [facility] clinic, call [phone] ([phone]). If you don't have a family doctor or clinic, we will help you find one. [facility] clinics are conveniently located to serve the needs of you and your family.             Review of your medicines      Our records show that you are taking the medicines listed below. If these are incorrect, please call your family doctor or clinic.        Dose / Directions Last dose taken    aspirin 81 MG chewable tablet   Dose:  81 mg        Take 81 mg by mouth daily   Refills:  0        cholecalciferol 2000 UNITS Caps   Dose:  1 capsule        Take 1 capsule by mouth daily   Refills:  0        ciprofloxacin 500 MG tablet   Commonly known as:  CIPRO   Dose:  500 mg   Quantity:  10  tablet        Take 1 tablet (500 mg) by mouth 2 times daily for 5 days   Refills:  0        DONEPEZIL HCL PO   Dose:  10 mg        Take 10 mg by mouth daily   Refills:  0        DULOXETINE HCL PO   Dose:  30 mg        Take 30 mg by mouth daily   Refills:  0        GABAPENTIN PO   Dose:  100 mg        Take 100 mg by mouth 3 times daily   Refills:  0        levETIRAcetam 1000 MG Tabs   Dose:  1000 mg   Quantity:  60 tablet        Take 1,000 mg by mouth 2 times daily   Refills:  0        multivitamin per tablet   Dose:  1 tablet   Quantity:  90 tablet        Take 1 tablet by mouth daily.   Refills:  3        nystatin 326375 UNIT/GM Powd   Commonly known as:  MYCOSTATIN        Apply topically 2 times daily Twice daily to red areas.   Refills:  0        OLANZAPINE PO   Dose:  2.5 mg        Take 2.5 mg by mouth 2 times daily   Refills:  0                Procedures and tests performed during your visit     CBC with platelets differential    Comprehensive metabolic panel    EKG 12 lead    Head CT w/o contrast    Lactic acid whole blood      Orders Needing Specimen Collection     None      Pending Results     Date and Time Order Name Status Description    10/4/2017 0257 EKG 12 lead Preliminary             Pending Culture Results     No orders found from 10/2/2017 to 10/5/2017.            Pending Results Instructions     If you had any lab results that were not finalized at the time of your Discharge, you can call the ED Lab Result RN at [phone]. You will be contacted by this team for any positive Lab results or changes in treatment. The nurses are available 7 days a week from 10A to 6:30P.  You can leave a message 24 hours per day and they will return your call.        Test Results From Your Hospital Stay        10/4/2017  4:18 AM      Component Results     Component Value Ref Range & Units Status    WBC 10.7 4.0 - 11.0 10e9/L Final    RBC Count 4.12 3.8 - 5.2 10e12/L Final    Hemoglobin 13.0 11.7 - 15.7 g/dL Final     Hematocrit 40.7 35.0 - 47.0 % Final    MCV 99 78 - 100 fl Final    MCH 31.6 26.5 - 33.0 pg Final    MCHC 31.9 31.5 - 36.5 g/dL Final    RDW 13.9 10.0 - 15.0 % Final    Platelet Count 151 150 - 450 10e9/L Final    Diff Method Automated Method  Final    % Neutrophils 40.5 % Final    % Lymphocytes 48.7 % Final    % Monocytes 6.6 % Final    % Eosinophils 3.5 % Final    % Basophils 0.3 % Final    % Immature Granulocytes 0.4 % Final    Nucleated RBCs 0 0 /100 Final    Absolute Neutrophil 4.3 1.6 - 8.3 10e9/L Final    Absolute Lymphocytes 5.2 0.8 - 5.3 10e9/L Final    Absolute Monocytes 0.7 0.0 - 1.3 10e9/L Final    Absolute Eosinophils 0.4 0.0 - 0.7 10e9/L Final    Absolute Basophils 0.0 0.0 - 0.2 10e9/L Final    Abs Immature Granulocytes 0.0 0 - 0.4 10e9/L Final    Absolute Nucleated RBC 0.0  Final    Reactive Lymphs Present  Final    RBC Morphology   Final    Consistent with reported results    Platelet Estimate Normal  Final         10/4/2017  3:42 AM      Component Results     Component Value Ref Range & Units Status    Sodium 141 133 - 144 mmol/L Final    Potassium 5.0 3.4 - 5.3 mmol/L Final    Specimen slightly hemolyzed, potassium may be falsely elevated    Chloride 111 (H) 94 - 109 mmol/L Final    Carbon Dioxide 30 20 - 32 mmol/L Final    Anion Gap <1 (L) 3 - 14 mmol/L Final    Glucose 82 70 - 99 mg/dL Final    Urea Nitrogen 27 7 - 30 mg/dL Final    Creatinine 0.78 0.52 - 1.04 mg/dL Final    GFR Estimate 73 >60 mL/min/1.7m2 Final    Non  GFR Calc    GFR Estimate If Black 88 >60 mL/min/1.7m2 Final    African American GFR Calc    Calcium 8.0 (L) 8.5 - 10.1 mg/dL Final    Bilirubin Total 0.4 0.2 - 1.3 mg/dL Final    Albumin 2.5 (L) 3.4 - 5.0 g/dL Final    Protein Total 6.0 (L) 6.8 - 8.8 g/dL Final    Alkaline Phosphatase 69 40 - 150 U/L Final    ALT 48 0 - 50 U/L Final    AST 41 0 - 45 U/L Final    Specimen is hemolyzed which can falsely elevate AST. Analysis of a   non-hemolyzed specimen may result  in a lower value.           10/4/2017  3:17 AM      Component Results     Component Value Ref Range & Units Status    Lactic Acid 1.5 0.7 - 2.0 mmol/L Final         10/4/2017  3:50 AM      Narrative     CT HEAD W/O CONTRAST  10/4/2017 3:31 AM     HISTORY: AMS. Diabetes and hypertension. Dementia.    TECHNIQUE: Axial images of the head and coronal reformations without  IV contrast material. Radiation dose for this scan was reduced using  automated exposure control, adjustment of the mA and/or kV according  to patient size, or iterative reconstruction technique.    COMPARISON: 9/28/2017.    FINDINGS: Mild diffuse ventricular prominence which may be on the  basis of atrophy. No intracranial hemorrhage, mass or mass effect. No  acute infarct identified. No shift of midline structures. No  significant change.        Impression     IMPRESSION: No acute intracranial findings.    CÉSAR ORTIZ MD                Clinical Quality Measure: Blood Pressure Screening     Your blood pressure was checked while you were in the emergency department today. The last reading we obtained was  BP: 120/73 . Please read the guidelines below about what these numbers mean and what you should do about them.  If your systolic blood pressure (the top number) is less than 120 and your diastolic blood pressure (the bottom number) is less than 80, then your blood pressure is normal. There is nothing more that you need to do about it.  If your systolic blood pressure (the top number) is 120-139 or your diastolic blood pressure (the bottom number) is 80-89, your blood pressure may be higher than it should be. You should have your blood pressure rechecked within a year by a primary care provider.  If your systolic blood pressure (the top number) is 140 or greater or your diastolic blood pressure (the bottom number) is 90 or greater, you may have high blood pressure. High blood pressure is treatable, but if left untreated over time it can put you  at risk for heart attack, stroke, or kidney failure. You should have your blood pressure rechecked by a primary care provider within the next 4 weeks.  If your provider in the emergency department today gave you specific instructions to follow-up with your doctor or provider even sooner than that, you should follow that instruction and not wait for up to 4 weeks for your follow-up visit.        Thank you for choosing [facility]       Thank you for choosing [facility] for your care. Our goal is always to provide you with excellent care. Hearing back from our patients is one way we can continue to improve our services. Please take a few minutes to complete the written survey that you may receive in the mail after you visit with us. Thank you!        [employer] [place] Information     [employer] gives you secure access to your electronic health record. If you see a primary care provider, you can also send messages to your care team and make appointments. If you have questions, please call your primary care clinic.  If you do not have a primary care provider, please call [phone] and they will assist you.        Care EveryWhere ID     This is your Care EveryWhere ID. This could be used by other organizations to access your [facility] medical records  [id number]        Equal Access to Services     BRO SHAHID : Yohana Logan, rosalinda campuzano, ron allan . So [facility] [place] [facility] [phone].    ATENCIÓN: Si habla español, tiene a candelario disposición servicios gratuitos de asistencia lingüística. Nydiaame al [phone].    We comply with applicable federal civil rights laws and [place] laws. We do not discriminate on the basis of race, color, national origin, age, disability, sex, sexual orientation, or gender identity.            After Visit Summary       This is your record. Keep this with you and show to your community pharmacist(s) and doctor(s) at your next visit.

## 2017-10-04 NOTE — ED NOTES
Pt resting quietly, responds to voice by opening eyes.  Tracks movement and is able to nod/shake head.  Still non-verbal.

## 2017-10-04 NOTE — DISCHARGE INSTRUCTIONS
Altered Level of Consciousness  Level of consciousness (LOC) is a measure of a person s ability to interact with other people and to react to the surroundings. A person with an altered level of consciousness may not respond to touch or voices. He or she may look vacant or blank and may not make eye contact with others. He or she may be limp and may not move for a long time or show little interest in moving. He or she may also be confused.  There are many causes of altered LOC. They include low blood sugar, infection, medicines, injuries, or other medical problems.  Altered LOC is a medical emergency. The healthcare provider will do tests to help find the cause. These may include blood tests and imaging tests. The person is treated so breathing and heart rate are stable. An intravenous (IV) line may be put into a vein in the arm or hand to give medicines. Once the cause of altered LOC is found, the goal is to treat the cause.  In almost all cases, the person will be admitted to the hospital for observation.  Home care  When your loved one is released from the hospital, you will be given guidelines for caring for him or her. In general:    Follow the healthcare provider's instructions for giving any prescribed medicines to your child.    Stay with your loved one or have another responsible adult look after him or her. Watch carefully for any return of symptoms or changes in behavior.    If the person has diabetes, make sure that any approved medicines are given on time and as prescribed.  Follow-up care  Follow up with the healthcare provider or our staff.  When to seek medical advice  Call the healthcare provider right away for any of the following:    Symptoms of altered LOC return    New symptoms appear  Date Last Reviewed: 8/23/2015 2000-2017 [employer]. [address] [place] [address], [place] [address]. All rights reserved. This information is not intended as a substitute for professional medical  care. Always follow your healthcare professional's instructions.

## 2017-10-04 NOTE — ED PROVIDER NOTES
History     Chief Complaint:  Altered Mental Status      The history is provided by the EMS personnel. History limited by: patient's nonverbal status.      Zahira Sutherland is a 67 year old female who presents via EMS with altered mental status. Per EMS patient was unresponsive to staff since 2200 last night. The on-call for the facility recommended watching patient as her vitals were normal. The charge nurse contacted a different on-call this morning given continued unresponsiveness who recommended evaluation in the emergency department prompting them to contact EMS. On their arrival patient was non-verbal but did track movements and nod when asked questions. Blood sugar was 110. Patient remains nonverbal in ED, provides no history. The patient did just arrive at this facility after hospital stay for new onset seizure, see hospital course as below.     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 Earlier EEG showed seizure activity and suspected with immune mediated seizures and did well with 3 doses of medrol IV 500mg. Blood glucose showed normal/optimal levels.  Currently she is at her baseline. She is tolerating her oral diet. No reported fever, diarrhea, abdominal pain, vomiting.   Continued on oral antibiotics for her UTI with isolated E coli  Resume with prior donepezil, cymbalta, neurontin and zyrexa  Please secure [facility] records as well. Request sent but did not receive any records  Her R maxillary and left submandibular area appears erythematous earlier but resolved and no signs of infection. Remained  afebrile.    Allergies:  Penicillins     Medications:    Levetiracetam   Cipro   Donepezil  Aspirin 81 mg  Gabapentin  Nystatin powder  Olanzapine  Duloxetine  Cholecalciferol   MVI    Past Medical History:    Dementia  Early onset alzheimer's disease  Word finding difficulty  Diabetes  HTN  Plantar fasciitis  Vitamin D deficiency   Onychomycosis   Apraxia   HTN  Seizure    Past Surgical History:    Hernia repair  Cholecystectomy   Gastric bypass  Back surgery     Family History:    Obesity  CV Disease  Breast cancer  Colorectal cancer  Diabetes  HTN    Social History:  Presents via EMS  Lives in Nursing Home   Tobacco use: Never  Alcohol use: Rarely  PCP: Brianna Crespo    Marital Status:        Review of Systems   Unable to perform ROS: Patient nonverbal     Physical Exam     Patient Vitals for the past 24 hrs:   BP Temp Temp src Pulse Resp SpO2   10/04/17 0330 - - - - - 96 %   10/04/17 0311 - - - - - 98 %   10/04/17 0310 120/73 - - - - -   10/04/17 0236 111/77 98.5  F (36.9  C) Oral (!) 47 16 96 %      Physical Exam  Constitutional:  Oriented to person, place, and time. Eyes tracking. Nonverbal. Not following commands.   HENT:   Head:    Normocephalic.   Mouth/Throat:   Oropharynx is clear and moist.   Eyes:    EOM are normal. Pupils are equal, round, and reactive to light.   Neck:    Neck supple.   Cardiovascular:  Normal rate, regular rhythm and normal heart sounds.      Exam reveals no gallop and no friction rub.       No murmur heard.  Pulmonary/Chest:  Effort normal and breath sounds normal.      No respiratory distress. No wheezes. No rales.      No reproducible chest wall pain.  Abdominal:   Soft. No distension. No tenderness. No rebound and no guarding.   Musculoskeletal:  Normal range of motion.   Neurological:   Dementia. Nonverbal. Not following commands.      Passively will hold all 4 extremities up. Strength is equal.      Cranial nerves grossly intact.   Skin:    No rash  noted. No pallor.        Emergency Department Course   ECG (02:36:33):  Rate 47 bpm. IN interval *. QRS duration 110. QT/QTc 484/428. P-R-T axes 71 -23 41. Undetermined rhythm. Otherwise normal ECG. Agree with computer interpretation. Interpreted at 0236 by Prabhakar Guerrero MD.     Imaging:  Radiographic findings were communicated with the care facility who voiced understanding of the findings.    CT Head without contrast:  IMPRESSION: No acute intracranial findings.    CÉSAR ORTIZ MD    Imaging independently reviewed and agree with radiologist interpretation.      Laboratory:  CBC: WNL (WBC 10.7, HGB 13.0, )   CMP: Chloride 111 (H), AG <1 (L), Calcium 8.0 (L), Albumin 2.5 (L), Protein total 6.0 (L) ow WNL (Creatinine 0.78)   Lactic Acid: 1.5     Emergency Department Course:  The patient arrived in the emergency department via EMS.  Past medical records, nursing notes, and vitals reviewed.  0243: I performed an exam of the patient and obtained history, as documented above.   IV inserted and blood drawn.   The patient was sent for a CT while in the emergency department, findings above.   Patient's daughters were attempted to be reached but unsuccessful.   I personally reviewed the laboratory results with the caregiver and answered all related questions prior to discharge.   0415: I rechecked the patient. Findings and plan explained to the caregiver. Patient discharged home with instructions regarding supportive care, medications, and reasons to return. The importance of close follow-up was reviewed.      Impression & Plan    Medical Decision Making:  Zahira Sutherland is a 67 year old female with recent admission for new onset seizures, discharged on Keppra, was found to have unrousable state for some hours at assisted living facility. Differential includes seizure, sepsis, stroke, ACS equivalent, or other causes. She underwent workup as seen. Workup otherwise nonspecific including CT of her head.She is  already on appropriate abx for her known UTI with no signs of sepsis. She is currently awake and alert but nonverbal. I did note on her previous admission charting that she does wax and wane from being verbal and following commands to being nonverbal. I do suspect that this is her sundowning. As she is otherwise comfortable, awake, and alert with eyes tracking appropriately I see no reason to admit for significant altered mental status. I did try to contact patient's 2 daughters and sister, unfortunately there is no answer at this time. She will be discharged back to her living facility.     Diagnosis:    ICD-10-CM    1. Altered mental status, unspecified altered mental status type R41.82 CBC with platelets differential       Disposition:  Discharged to home.    Vince Nieves  10/4/2017   [facility] EMERGENCY DEPARTMENT  I, Vince Nieves, am serving as a scribe at 2:43 AM on 10/4/2017 to document services personally performed by Prabhakar Guerrero MD based on my observations and the provider's statements to me.       Prabhakar Guerrero MD  10/04/17 0647       Prabhakar Guerrero MD  10/04/17 0648       Prabhakar Guerrero MD  10/04/17 0649

## 2017-10-04 NOTE — ED AVS SNAPSHOT
[facility] Emergency Department    [address]    [facility] [address]    Phone:  [phone]    Fax:  [phone]                                       Zahira Sutherland   MRN: [id number]    Department:  [facility] Emergency Department   Date of Visit:  10/4/2017           After Visit Summary Signature Page     I have received my discharge instructions, and my questions have been answered. I have discussed any challenges I see with this plan with the nurse or doctor.    ..........................................................................................................................................  Patient/Patient Representative Signature      ..........................................................................................................................................  Patient Representative Print Name and Relationship to Patient    ..................................................               ................................................  Date                                            Time    ..........................................................................................................................................  Reviewed by Signature/Title    ...................................................              ..............................................  Date                                                            Time

## 2017-11-01 PROBLEM — R47.01: Status: ACTIVE | Noted: 2017-01-01

## 2017-11-01 PROBLEM — R13.10 DYSPHAGIA, UNSPECIFIED TYPE: Status: ACTIVE | Noted: 2017-01-01

## 2017-11-01 NOTE — PROGRESS NOTES
[facility]    Chief Complaint   Patient presents with     halfway Regulatory       HPI:    Zahira Sutherland is a 67 year old  (1950), who is being seen today for a federally mandated E/M visit at [facility].  HPI information obtained from: facility chart records.   Today's concerns are:  Seizure (H)  She has been free of seizures since her hospital d/c    Word finding difficulty  She is not able to express her needs    Gibberish aphasia  She speaks gibberish    Early onset Alzheimer's disease with behavioral disturbance> was in [facility] spring 2016  She is dependent for all ADLs     BMI 40.0-44.9, adult (H)  BMI is 40.1> she has lost 2 lbs    Dysphagia  She coughs with thin liquids      ALLERGIES: Penicillins  PAST MEDICAL HISTORY:  has a past medical history of Dementia; Diabetes; and Hypertension.  PAST SURGICAL HISTORY:  has a past surgical history that includes surgical history of - (1980s); gastric bypass (1992); Cholecystectomy; and back surgery.  FAMILY HISTORY: family history includes Breast Cancer in her mother; CEREBROVASCULAR DISEASE in her mother; Cancer - colorectal in her father; DIABETES in her father; HEART DISEASE in her mother; Hypertension in her sister; Obesity in her brother, father, mother, and sister; Other Cancer in her father.  SOCIAL HISTORY:  reports that she has never smoked. She has never used smokeless tobacco. She reports that she does not drink alcohol or use illicit drugs.    MEDICATIONS:  Current Outpatient Prescriptions   Medication Sig Dispense Refill     levETIRAcetam 1000 MG TABS Take 1,000 mg by mouth 2 times daily 60 tablet 0     aspirin 81 MG chewable tablet Take 81 mg by mouth daily       GABAPENTIN PO Take 100 mg by mouth 3 times daily       nystatin (MYCOSTATIN) 071576 UNIT/GM POWD Apply topically 2 times daily Twice daily to red areas.       OLANZAPINE PO Take 2.5 mg by mouth 2 times daily       DULOXETINE  "HCL PO Take 30 mg by mouth daily       cholecalciferol 2000 UNITS CAPS Take 1 capsule by mouth daily       Multiple Vitamin (MULTIVITAMIN) per tablet Take 1 tablet by mouth daily. 90 tablet 3     Medications reviewed:  Medications reconciled to facility chart and changes were made to reflect current medications as identified as above med list. Below are the changes that were made:   Medications stopped since last EPIC medication reconciliation:   There are no discontinued medications.    Medications started since last [facility] medication reconciliation:  No orders of the defined types were placed in this encounter.    Case Management:  I have reviewed the care plan and MDS and do agree with the plan. Patient's desire to return to the community is not present.  Information reviewed:  Medications, vital signs, orders, and nursing notes.    ROS:  Unobtainable secondary to cognitive impairment or aphasia.    Exam:  Vitals: /80  Pulse 54  Temp 98  F (36.7  C)  Resp 19  Ht 5' 6\" (1.676 m)  Wt 248 lb 3.2 oz (112.6 kg)  SpO2 96%  BMI 40.06 kg/m2  BMI= Body mass index is 40.06 kg/(m^2).  GENERAL APPEARANCE:  Alert, morbidly obese, she looks befuddled  ENT:  Mouth and posterior oropharynx normal, moist mucous membranes, she appears to hear  She coughed while drinking water> her head was tilted back  EYES:  EOM, conjunctivae, lids, pupils and irises normal, she had nice eye contact  RESP:  respiratory effort and palpation of chest normal, lungs clear to auscultation , no respiratory distress  CV:  Palpation and auscultation of heart done , regular rate and rhythm, no murmur, rub, or gallop, no edema  ABDOMEN:  normal bowel sounds, soft, nontender, no hepatosplenomegaly or other masses> diff to examine 2nd to obesity  M/S:   Gait and station abnormal >Depends on staff for transfers, is transported by staff with a w/c  NEURO:   Cranial nerves 2-12 are normal tested and grossly at patient's baseline  PSYCH:  oriented to " her name, insight and judgement impaired, memory impaired , anxious     BP 10/05-10/29:  104-124/54-83 mmHg    Lab/Diagnostic data:    CBC RESULTS:   Recent Labs   Lab Test  10/04/17   0305  09/28/17   0810   WBC  10.7  8.9   RBC  4.12  4.60   HGB  13.0  14.5   HCT  40.7  45.6   MCV  99  99   MCH  31.6  31.5   MCHC  31.9  31.8   RDW  13.9  13.6   PLT  151  179       Last Basic Metabolic Panel:  Recent Labs   Lab Test  10/04/17   0305  09/29/17   0858   NA  141  142   POTASSIUM  5.0  3.8   CHLORIDE  111*  109   EVY  8.0*  8.4*   CO2  30  29   BUN  27  15   CR  0.78  0.80   GLC  82  88       Liver Function Studies -   Recent Labs   Lab Test  10/04/17   0305  09/29/17   0858   PROTTOTAL  6.0*  6.2*   ALBUMIN  2.5*  2.8*   BILITOTAL  0.4  0.8   ALKPHOS  69  86   AST  41  13   ALT  48  24       TSH   Date Value Ref Range Status   09/29/2017 2.49 0.40 - 4.00 mU/L Final   05/31/2017 3.89 0.40 - 4.00 mU/L Final       Lab Results   Component Value Date    A1C 5.5 05/31/2017    A1C 5.9 08/15/2011       ASSESSMENT/PLAN  Seizure (H)  Has been free of seizures> will cont with current tx  I also talked with nursing about her behaviors  I got mixed answers  I asked for documentation to assess if I can decrease the Olanzapine since the Keppra was started    Word finding difficulty  Will always talk with her in simple sentences  Will have eye talk while talking with her  Will give her time to answer simple questions    Gibberish aphasia  Will always talk with her in simple sentences  Will have eye talk while talking with her  Will give her time to answer simple questions    Early onset Alzheimer's disease with behavioral disturbance> was in [facility] 2016  No new tx  I did ask nursing staff to document her behaviors> this is as a means to assess if I can decrease the Zyprexa    BMI 40.0-44.9, adult (H)  She has no means to burn up extra calories since she is not mobile on her own    Dysphagia, unspecified type  I did  ask again that she gets a high back w/c for optimal positioning of head      Orders:  See A&P      Time> 25 min    Electronically signed by:  GABRIELE Brooks CNP

## 2017-11-03 PROBLEM — I63.9 CVA (CEREBRAL VASCULAR ACCIDENT) (H): Status: ACTIVE | Noted: 2017-01-01

## 2017-11-03 NOTE — IP AVS SNAPSHOT
"          [facility] [address] [facility]: [phone]                                              INTERAGENCY TRANSFER FORM - PHYSICIAN ORDERS   11/3/2017                    Hospital Admission Date: 11/3/2017  VILMA MARTINEZ   : 1950  Sex: Female        Attending Provider: Leonel Junior MD     Allergies:  Penicillins    Infection:  None   Service:  HOSPITALIST    Ht:  1.676 m (5' 6\")   Wt:  113.9 kg (251 lb)   Admission Wt:  115 kg (253 lb 8.5 oz)    BMI:  40.51 kg/m 2   BSA:  2.3 m 2            Patient PCP Information     Provider PCP Type    GABRIELE Brooks [facility]      ED Clinical Impression     Diagnosis Description Comment Added By Time Added    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 (H) [I63.40] 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 (H) [I63.40]  Carli Shoemaker MD 11/3/2017  4:04 PM      Hospital Problems as of 2017              Priority Class Noted POA    CVA (cerebral vascular accident) (H) Medium  11/3/2017 Yes      Non-Hospital Problems as of 2017              Priority Class Noted    Hypertension goal BP (blood pressure) < 130/80 High  Unknown    Knee pain Medium  11/3/2008    Plantar fasciitis Medium  11/3/2008    Vitamin D deficiency Medium  2009    Advance Care Planning Medium  10/31/2011    Early onset Alzheimer's disease with behavioral disturbance> was in [facility] spring 2016 High  2017    Moderate episode of recurrent major depressive disorder (H) High  2017    Apraxia Medium  2017    Family history of malignant neoplasm of ovary> sister  Medium  2017    Onychomycosis> toe nails Medium  2017    History of diabetes mellitus, type II Medium  2017    Routine general medical examination at a health care facility> done 2017 Low  2017    Bariatric surgery status Medium  2017    Word finding difficulty High  2017    BMI 40.0-44.9, adult (H) " Medium  8/4/2017    Seizure (H) Medium  9/28/2017    Bradycardia High  10/4/2017    Gibberish aphasia Medium  11/1/2017    Dysphagia, unspecified type Medium  11/1/2017      Code Status History     Date Active Date Inactive Code Status Order ID Comments User Context    11/6/2017 10:36 AM  Full Code [id number]  Derek Kimball MD Outpatient    11/3/2017  6:11 PM 11/6/2017 10:36 AM Full Code [id number]  Leonel Junior MD Inpatient    10/3/2017  8:38 AM 11/3/2017  6:11 PM Full Code [id number]  Augusto Padilla MD Outpatient    9/28/2017  2:41 PM 10/3/2017  8:38 AM Full Code [id number]  Morris Gutierrez MD Inpatient    5/26/2017 12:20 PM 9/28/2017  2:41 PM Full Code [id number]  Brianna Crespo APRN CNP Outpatient         Medication Review      CONTINUE these medications which have NOT CHANGED        Dose / Directions Comments    aspirin 81 MG chewable tablet        Dose:  81 mg   Take 81 mg by mouth daily   Refills:  0        bisacodyl 10 MG Suppository   Commonly known as:  DULCOLAX        Dose:  10 mg   Place 10 mg rectally as needed for constipation   Refills:  0        cholecalciferol 2000 UNITS Caps        Dose:  1 capsule   Take 1 capsule by mouth daily   Refills:  0        DULOXETINE HCL PO        Dose:  30 mg   Take 30 mg by mouth daily   Refills:  0        GABAPENTIN PO        Dose:  100 mg   Take 100 mg by mouth 3 times daily   Refills:  0        levETIRAcetam 1000 MG Tabs   Used for:  Grand mal seizure (H)        Dose:  1000 mg   Take 1,000 mg by mouth 2 times daily   Quantity:  60 tablet   Refills:  0        multivitamin per tablet   Used for:  Hypertension goal BP (blood pressure) < 130/80        Dose:  1 tablet   Take 1 tablet by mouth daily.   Quantity:  90 tablet   Refills:  3        OLANZAPINE PO        Dose:  2.5 mg   Take 2.5 mg by mouth 2 times daily   Refills:  0                Summary of Visit     Reason for your hospital stay       Mrs. Sutherland came to the hospital with  the concerns of a stroke. The nurses at her facility saw a left facial droop and left sided weakness, the EMT personnel thought they saw a left facial droop and right sided weakness and the ED physician saw a right facial droop and right sided weakness.  The exams on the floor had fluctuating levels of weakness and both right and left nasolabial fold flattening at different times that changed quickly  An attempt at a MRI was technically difficult due to her not able to understand to hold still. 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  The family was informed of this and a discussion of the natural history of this disease and prognosis were undertaken.  She did have an echocardiogram and bubble study and the bubble study was negative. The echocardiogram suggested a left atrial mass. Cardiology was consulted and their recommendation was a cardiac MRI.  However, given her previous difficulties with the MRI scanner and the fact that there is no evidence of cerebral emboli and her limited prognosis, it was felt this was not appropriate  I have discussed sending her back to [facility] without further testing or treatments and the family is in agreement. I also broached the possibility of Palliative care consultation with discussion about code status and whether or not she will be further treated or hospitalized.             After Care     Activity - Up with nursing assistance           Advance Diet as Tolerated       Follow this diet upon discharge: Orders Placed This Encounter      Room Service      Combination Diet Dysphagia Diet Level 2: Mechan Altered; Thin Liquids (water, ice chips, juice, milk gelatin, ice cream, etc)       Fall precautions           General info for SNF       Length of Stay Estimate: Long Term  Care  Condition at Discharge: Stable  Level of care:board and care  Rehabilitation Potential: Poor  Admission H&P remains valid and up-to-date: Yes  Recent Chemotherapy: N/A  Use Nursing Home Standing Orders: Yes       Mantoux instructions       Give two-step Mantoux (PPD) Per Facility Policy Yes             Statement of Approval     Ordered          11/06/17 1037  I have reviewed and agree with all the recommendations and orders detailed in this document.  EFFECTIVE NOW     Approved and electronically signed by:  Derek Kimball MD

## 2017-11-03 NOTE — IP AVS SNAPSHOT
` `     [facility]: [phone]                 INTERAGENCY TRANSFER FORM - NOTES (H&P, Discharge Summary, Consults, Procedures, Therapies)   11/3/2017                    Hospital Admission Date: 11/3/2017  ZAHIRA MARTINEZ   : 1950  Sex: Female        Patient PCP Information     Provider PCP Type    GABRIELE Brooks CNP General         History & Physicals      H&P by Leonel Junior MD at 11/3/2017  5:02 PM     Author:  Leonel Junior MD Service:  Hospitalist Author Type:  Physician    Filed:  11/3/2017  9:30 PM Date of Service:  11/3/2017  5:02 PM Creation Time:  11/3/2017  5:02 PM    Status:  Signed :  Leonel Junior MD (Physician)         [facility]    History and Physical  Hospitalist       Date of Admission:  11/3/2017    Assessment & Plan   Zahira Martinez is a 67 year old female who presents with[JW1.1] right-sided facial droop and extremity weakness    Expressive aphasia[JW1.2]:[JW1.1] Concerning for CVA with associated right-sided facial droop and upper extremity weakness, though diffusion-weighted CT images limited by motion artifact. Patient with a history of expressive aphasia as well which precipitated evaluation for seizure earlier this year. Certainly patient's presenting symptoms could be secondary to a Ralph's paralysis following known seizure disorder (EEG diagnosis in 2017), though unclear at this time given patient's inability to provide history and definitive imaging still pending. At this time, will be evaluating for stroke as well as seizure with appropriate precautions in place.[JW1.2]  -[JW1.1]Neurology consulted, aware of patient from the emergency department  -Daily aspirin p.o./[place]  -N.p.o. until cleared by speech pathology  -Speech pathology, PT/OT pending  -Seizure precautions  -MRI brain pending  -Keppra level added on  -Transitioned to Keppra 1 g b.i.d. IV given n.p.o.  status  -Normal saline at 100 per hour    Epilepsy: Potentially contributing to expressive aphasia as above. Diagnosed with seizure disorder at [facility] in October 2017 in the setting of urinary tract infection. 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 Patient does have a known left parietal calcified meningioma which appears stable on imaging, though this could also be contributing.   -Neurology consulted  -I have not initiated IV Solu-Medrol at this time; paraneoplastic/Purkinje cell nuclear IgG screen negative from prior hospitalization  -As above, Keppra now converted to 1 g IV b.i.d. given n.p.o. status    Early onset Alzheimer's dementia with associated behavioral disturbance:   -Olanzapine 2.5 mg b.i.d. when able to tolerate all her medications  -Gabapentin 100 mg t.i.d. when able to tolerate oral medications    Morbid obesity: Increased risk of all cause mortality. Has undergone bariatric surgery historically. History of type 2 diabetes, currently diet controlled following bariatric surgery.  -Multivitamin when tolerating oral intake[JW1.2]    DVT Prophylaxis:[JW1.1] Pneumatic Compression Devices[JW1.3]    Code Status:[JW1.1] Full Code[JW1.3] as per chart review and most recent scanned in [facility].[JW1.2]    Disposition: Expected discharge in[JW1.1] potentially 2-3 days pending evaluation of seizures versus CVA[JW1.2]    Leonel [facility]    Primary Care Physician   Brianna Crespo    Chief Complaint[JW1.1]   Expressive aphasia[JW1.2]    History is obtained from Dr Shoemaker in the Emergency Department, chart review. Pt only able to provide very limited history given dementia and expressive aphasia (able to state in the affirmative that she has difficulty speaking, though unable to follow commands in a degree that is reassuring to me that patient does not have receptive aphasia or dementia limiting history even to this extent.)[JW1.1] Family is no  longer present in the emergency department at time of my evaluation.[JW1.4]    History of Present Illness   Zahira Sutherland is a 67 year old female who presents with[JW1.1] expressive aphasia, L-sided facial droop, weakness in the setting of early onset Alzheimer's dementia for which she resides at [facility]. Patient with onset of symptoms noted by staff at approximately 10 AM, left-sided facial droop noted approximately noon, arrived in the emergency department at approximately 2 PM. Initial CT imaging without evidence of thrombosis, no intracranial bleed, no acute intracranial pathology, diffusion-weighted CT images limited by motion artifact, though no definitive evidence of stroke. Dr. Hunter of neurology was made aware of patient's presentation in the emergency department. Throughout her emergency department stay, patient with no significant improvement in her symptoms. On my evaluation, family is no longer present. Patient is able to answer in the affirmative when asked if she is having difficulty getting her words out. Otherwise with garbled speech and limited ability to follow commands consistently. Neurologic exam is also limited by inability to follow commands. This said, patient does have an overt left-sided facial droop as compared to right and thickened attempts at speech.    Patient's recent history is significant for a diagnosis of seizures by EEG at [facility] early October. Patient with episodes of expressive aphasia at that time as well. Neurologic findings currently observed could be secondary to uncontrolled seizure disorder if workup for CVA is negative. Regardless, neurology will follow. 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 No report of unilateral weakness or facial droop at that time.    Patient is unable  to provide any additional meaningful history. Unclear if this is secondary to postictal state, expressive and receptive aphasia, or chronic dementing illness. Reportedly, however, patient is conversant at baseline, making Alzheimer's and unlikely cause of her current inability to provide history.[JW1.2]    Past Medical History    I have reviewed this patient's medical history and updated it with pertinent information if needed.   Past Medical History:   Diagnosis Date     Alzheimer's disease with early onset      Anemia      Apraxia      Dementia      Depressive disorder      Diabetes      Hypertension      Hypertension      Major depressive disorder, recurrent (H)      Obesity      Personal history of other endocrine, nutritional and metabolic disease      Tinea unguium      Vitamin D deficiency        Past Surgical History   I have reviewed this patient's surgical history and updated it with pertinent information if needed.  Past Surgical History:   Procedure Laterality Date     BACK SURGERY      hernia repair in her 38     CHOLECYSTECTOMY      in her 30s     GASTRIC BYPASS  1992     SURGICAL HISTORY OF -   1980s    back surgery- uncertain type       Prior to Admission Medications   Prior to Admission Medications   Prescriptions Last Dose Informant Patient Reported? Taking?   DULOXETINE HCL PO 11/2/2017 at 1500 Nursing Home Yes Yes   Sig: Take 30 mg by mouth daily   GABAPENTIN PO 11/3/2017 at 1200 Nursing Home Yes Yes   Sig: Take 100 mg by mouth 3 times daily   Multiple Vitamin (MULTIVITAMIN) per tablet 11/3/2017 at 800 Nursing Home No Yes   Sig: Take 1 tablet by mouth daily.   OLANZAPINE PO 11/3/2017 at 800 [facility] Yes Yes   Sig: Take 2.5 mg by mouth 2 times daily   aspirin 81 MG chewable tablet 11/3/2017 at 800 [facility] Yes Yes   Sig: Take 81 mg by mouth daily   bisacodyl (DULCOLAX) 10 MG Suppository 11/2/2017 at 2233 Nursing Home Yes Yes   Sig: Place 10 mg rectally as needed for constipation    cholecalciferol 2000 UNITS CAPS 11/3/2017 at 800 [facility] Yes Yes   Sig: Take 1 capsule by mouth daily   levETIRAcetam 1000 MG TABS 11/3/2017 at 800 [facility] No Yes   Sig: Take 1,000 mg by mouth 2 times daily      Facility-Administered Medications: None     Allergies   Allergies   Allergen Reactions     Penicillins        Social History   I have reviewed this patient's social history and updated it with pertinent information if needed. Zahira Sutherland  reports that she has never smoked. She has never used smokeless tobacco. She reports that she does not drink alcohol or use illicit drugs.    Family History   I have reviewed this patient's family history and updated it with pertinent information if needed.   Family History   Problem Relation Age of Onset     Obesity Mother      CEREBROVASCULAR DISEASE Mother      Breast Cancer Mother      HEART DISEASE Mother      Obesity Father      Cancer - colorectal Father      DIABETES Father      Other Cancer Father      Obesity Sister      Obesity Brother      Hypertension Sister        Review of Systems[JW1.1]   The only history that patient is able to contribute is that she answers in the affirmative that she is having difficulty speaking. As above, is unable to follow commands with regularity raising suspicion that patient's history even to this extent is limited.[JW1.4]    Physical Exam   Temp: 98  F (36.7  C) Temp src: Temporal BP: (!) 117/95 Pulse: 55 Heart Rate: 51 Resp: 10 SpO2: 100 % O2 Device: None (Room air)    Vital Signs with Ranges  Temp:  [98  F (36.7  C)] 98  F (36.7  C)  Pulse:  [54-90] 55  Heart Rate:  [51-64] 51  Resp:  [10-19] 10  BP: (109-138)/() 117/95  SpO2:  [91 %-100 %] 100 %  253 lbs 8.46 oz    Constitutional: no acute distress, alert[JW1.1]. Sitting up in bed. Mildly obese.[JW1.5]  Eyes: no scleral icterus or injection  HEENT: moist mucous membranes  Respiratory: breath sounds clear bilaterally to auscultation, no wheezes, no  crackles[JW1.1]. Poor inspiration[JW1.5]  Cardiovascular: regular rate and rhythm, no murmur  GI: abdomen soft, non-tender, normoactive bowel sounds, no masses  Lymph/Hematologic:[JW1.1] chronic bilateral lymphedema with trace pitting[JW1.5]  Musculoskeletal: muscular tone intact in all extremities  Neurologic:[JW1.1] patient alert. Unfortunately, patient cannot consistently follow commands. For instance, patient is unable to follow directions for assessment of extraocular musculature; strength exam is variable. 1/6 gripstrength bilaterally in upper extremities. Thickened speech. Unclear if receptive aphasia is present or this is secondary to dementia.[JW1.5]  Psychiatric:[JW1.1] unable to assess[JW1.5]    Data   Data reviewed today:  I personally reviewed[JW1.1] the head CT image(s) showing left temporal calcified (likely) meningioma present, noted on prior scans as well, no hemorrhage present.[JW1.5].      Recent Labs  Lab 11/03/17  1415   WBC 7.7   HGB 15.2   MCV 98      INR 0.99      POTASSIUM 4.0   CHLORIDE 107   CO2 30   BUN 14   CR 0.90   ANIONGAP 7   EVY 9.5   *       Recent Results (from the past 24 hour(s))   CT Head w/o Contrast    Narrative    CT HEAD W/O CONTRAST  11/3/2017 2:39 PM    HISTORY: Code stroke. Listing to the left side in the nursing  facility. Right facial droop.    TECHNIQUE: Scans were obtained through the head without IV contrast.   Radiation dose for this scan was reduced using automated exposure  control, adjustment of the mA and/or kV according to patient size, or  iterative reconstruction technique.    COMPARISON: 10/04/2017.    FINDINGS: Moderate atrophy. Mild low-density changes in the white  matter both hemispheres consistent with chronic small vessel ischemic  disease.  No hemorrhage, mass lesion, or focal area of acute  infarction identified. Paranasal sinuses are normal. No bony  abnormality. No interval change. CT angiogram to follow.      Impression     IMPRESSION:   1. Nothing acute.  2. Atrophy.  3. CT angiogram to follow.    RADHA WILCOX MD   CT Head w Contrast    Narrative    CT ANGIOGRAM OF THE HEAD AND NECK WITH CONTRAST  11/3/2017 3:32 PM     HISTORY: Code stroke.    TECHNIQUE:  Precontrast localizing scans were followed by CT  angiography with an injection of 70mL Isovue-370 (accession  [id number]), 50mL Isovue-370 (accession [id number]) IV with scans  through the head and neck.  Images were transferred to a separate 3-D  workstation where multiplanar reformations and 3-D images were  created.  Estimates of carotid stenoses are made relative to the  distal internal carotid artery diameters except as noted. Radiation  dose for this scan was reduced using automated exposure control,  adjustment of the mA and/or kV according to patient size, or iterative  reconstruction technique.  Perfusion scans were performed at three  levels with injection of an additional 40 mL IV nonionic contrast and  20 mL saline flush.  These images were processed on a separate 3-D  workstation.    COMPARISON: None.    CT HEAD FINDINGS:  No contrast enhancing lesions. CT perfusion images  are limited due to motion artifact.    CT ANGIOGRAM HEAD FINDINGS:  The major intracranial arteries including  the proximal branches of the anterior cerebral, middle cerebral, and  posterior cerebral arteries appear patent without vascular cutoff. No  aneurysm identified. No significant stenosis. The P1 segment of the  left posterior cerebral artery is not visualized and is likely  hypoplastic or congenitally absent. The left posterior cerebral artery  is supplied by the patent left posterior communicating artery. Venous  circulation is unremarkable.     CT ANGIOGRAM NECK FINDINGS:   Normal origin of the great vessels from the aortic arch.    Right carotid artery: The right common and internal carotid arteries  are patent.  No significant stenosis.  Torturous right common and  internal carotid  arteries with a retropharyngeal course.    Left carotid artery: The left common and internal carotid arteries are  patent.  No significant stenosis.  Tortuous left common and internal  carotid arteries with a retropharyngeal course.    Vertebral arteries:  Vertebral arteries are patent without evidence of  dissection.  No significant stenosis.      Other findings: Degenerative changes in the spine.      Impression    IMPRESSION:   1. Patent arteries in the head and neck without vascular cutoff. No  evidence of dissection. No aneurysm identified. No significant  stenosis.  2. CT perfusion images of the head are limited due to motion artifact.    Results discussed with Carli Shoemaker at 3:45 PM on 11/3/2017.    CHERRI MIN MD   CTA Angiogram Head Neck    Narrative    CT ANGIOGRAM OF THE HEAD AND NECK WITH CONTRAST  11/3/2017 3:32 PM     HISTORY: Code stroke.    TECHNIQUE:  Precontrast localizing scans were followed by CT  angiography with an injection of 70mL Isovue-370 (accession  [id number]), 50mL Isovue-370 (accession [id number]) IV with scans  through the head and neck.  Images were transferred to a separate 3-D  workstation where multiplanar reformations and 3-D images were  created.  Estimates of carotid stenoses are made relative to the  distal internal carotid artery diameters except as noted. Radiation  dose for this scan was reduced using automated exposure control,  adjustment of the mA and/or kV according to patient size, or iterative  reconstruction technique.  Perfusion scans were performed at three  levels with injection of an additional 40 mL IV nonionic contrast and  20 mL saline flush.  These images were processed on a separate 3-D  workstation.    COMPARISON: None.    CT HEAD FINDINGS:  No contrast enhancing lesions. CT perfusion images  are limited due to motion artifact.    CT ANGIOGRAM HEAD FINDINGS:  The major intracranial arteries including  the proximal branches of the anterior cerebral, middle  cerebral, and  posterior cerebral arteries appear patent without vascular cutoff. No  aneurysm identified. No significant stenosis. The P1 segment of the  left posterior cerebral artery is not visualized and is likely  hypoplastic or congenitally absent. The left posterior cerebral artery  is supplied by the patent left posterior communicating artery. Venous  circulation is unremarkable.     CT ANGIOGRAM NECK FINDINGS:   Normal origin of the great vessels from the aortic arch.    Right carotid artery: The right common and internal carotid arteries  are patent.  No significant stenosis.  Torturous right common and  internal carotid arteries with a retropharyngeal course.    Left carotid artery: The left common and internal carotid arteries are  patent.  No significant stenosis.  Tortuous left common and internal  carotid arteries with a retropharyngeal course.    Vertebral arteries:  Vertebral arteries are patent without evidence of  dissection.  No significant stenosis.      Other findings: Degenerative changes in the spine.      Impression    IMPRESSION:   1. Patent arteries in the head and neck without vascular cutoff. No  evidence of dissection. No aneurysm identified. No significant  stenosis.  2. CT perfusion images of the head are limited due to motion artifact.    Results discussed with Carli Shoemaker at 3:45 PM on 11/3/2017.    CHERRI MIN MD[JW1.1]        Revision History        User Key Date/Time User Provider Type Action    > JW1.2 11/3/2017  9:30 PM Leonel Junior MD Physician Sign     JW1.5 11/3/2017  7:11 PM Leonel Junior MD Physician      JW1.4 11/3/2017  6:40 PM Leonel Junior MD Physician      JW1.3 11/3/2017  5:18 PM Leonel Junior MD Physician      JW1.1 11/3/2017  5:02 PM Leonel Junior MD Physician                      Discharge Summaries      Discharge Summaries by Derek Kimball MD at 11/6/2017 10:57 AM     Author:  Derek Kimball MD Service:  Internal Medicine  Author Type:  Physician    Filed:  11/6/2017 10:57 AM Date of Service:  11/6/2017 10:57 AM Creation Time:  11/6/2017 10:37 AM    Status:  Signed :  Derek Kimball MD (Physician)         [facility]    Discharge Summary  Hospitalist    Date of Admission:  11/3/2017  Date of Discharge:  11/6/2017  Discharging Provider: Derek Kimball MD  Date of Service (when I saw the patient): 11/06/17    Discharge Diagnoses   Frontal Temporal Dementia  Seizure disorder  Possible left atrial mass    History of Present Illness   Zahira Sutherland is an 67 year old female who presented with possible right and left sided weakness ( different examiners and different time of examination yielded inconsistent results)  A stroke workup was done and the MRI was of poor quality due to motion. Due to her dementia, she was unable to hold still The Radiologist felt there was a possible right lacunar infarct but the Neurologist felt this was artifact. It was felt not appropriate to sedate her to repeat the MRI  She had a echocardiogram and bubble study as part of the stroke workup and the bubble study was negative for potential paradoxical emboli, but the left atrium suggested a mass. Cardiology was consulted and their recommendation for further workup would be a cardiac MRI. Given she has no signs of cerebral emboli, the difficultiies with the head MRI and her Frontal Temporal dementia, prognosis and what the therapy would be for a cardiac mass (surgery or observation) it was felt a cardiac MRI was not indicated at this time  I have discussed the diagnosis with the family.  I have strongly suggested a  Palliative medicine consultation when she is back at [facility] with discussion of how to maximize her quality of life, consideration of changing her code status and consideration of any further hospitalization or antibiotic treatment.    Hospital Course   Zahira Sutherland was admitted on 11/3/2017.  The following  problems were addressed during her hospitalization:        Derek Kimball MD    Significant Results and Procedures   See the consultation notes, H&P, progress notes, labs and imaging    Pending Results   These results will be followed up by Ms. Zak  Unresulted Labs Ordered in the Past 30 Days of this Admission     Date and Time Order Name Status Description    11/3/2017 1811 Vitamin D Deficiency In process           Code Status   Full Code This needs further discussion       Primary Care Physician   Brianna Crespo    Physical Exam   Temp: 98.4  F (36.9  C) Temp src: Axillary BP: 100/58 Pulse: 81 Heart Rate: 53 Resp: 16 SpO2: 95 % O2 Device: None (Room air)    Vitals:    11/03/17 1427 11/04/17 0538   Weight: 115 kg (253 lb 8.5 oz) 113.9 kg (251 lb)     Vital Signs with Ranges  Temp:  [97.7  F (36.5  C)-98.6  F (37  C)] 98.4  F (36.9  C)  Pulse:  [81] 81  Heart Rate:  [48-63] 53  Resp:  [16] 16  BP: (100-131)/(56-86) 100/58  SpO2:  [91 %-95 %] 95 %  I/O last 3 completed shifts:  In: 350 [P.O.:350]  Out: -     Constitutional: opens eyes when spoken to, usually one or no word response and then no further speech  Eyes: clear, EOM full but she doesn't tract well  ENT: not examined, mouth moist  Respiratory: lungs are clear to auscultation  Cardiovascular: regular rhythm, normal S1 and S2, no murmurs or S3, no edema  GI: normal bowel sounds, not tender  Lymph/Hematologic: not examined  Genitourinary: not examined  Skin: intact  Musculoskeletal: no wasting.   Neurologic: Extraocular movements full, but hard to exam. Depending on her facial expression, either the right or left nasolabial fold can appear flattened. Moves both arms and legs in a limited fashion, often inconsistent, but no consistent weakness  Neuropsychiatric: alert, limited verbal response    Discharge Disposition   Discharged to long-term care facility  Condition at discharge: Guarded    Consultations This Hospital Stay    NEUROLOGY IP CONSULT  PHYSICAL THERAPY ADULT IP CONSULT  OCCUPATIONAL THERAPY ADULT IP CONSULT  SPEECH LANGUAGE PATH ADULT IP CONSULT  SWALLOW EVAL SPEECH PATH AT BEDSIDE IP CONSULT  SMOKING CESSATION PROGRAM IP CONSULT  PHARMACY IP CONSULT  CARDIOLOGY IP CONSULT    Time Spent on this Encounter   I, Derek Kimball, personally saw the patient today and spent greater than 30 minutes discharging this patient.    Discharge Orders     General info for SNF   Length of Stay Estimate: Long Term Care  Condition at Discharge: Stable  Level of care:board and care  Rehabilitation Potential: Poor  Admission H&P remains valid and up-to-date: Yes  Recent Chemotherapy: N/A  Use Nursing Home Standing Orders: Yes     Mantoux instructions   Give two-step Mantoux (PPD) Per Facility Policy Yes     Reason for your hospital stay   Mrs. Sutherland came to the hospital with the concerns of a stroke. The nurses at her facility saw a left facial droop and left sided weakness, the EMT personnel thought they saw a left facial droop and right sided weakness and the ED physician saw a right facial droop and right sided weakness.  The exams on the floor had fluctuating levels of weakness and both right and left nasolabial fold flattening at different times that changed quickly  An attempt at a MRI was technically difficult due to her not able to understand to hold still. There was a suggestion of a right basal ganglia infarct but the neurologist felt this was artifact  Further history from her nurse at [facility] and with the family was consistent with Frontal Temporal dementia, not Alzheimer's.  The family was informed of this and a discussion of the natural history of this disease and prognosis were undertaken.  She did have an echocardiogram and bubble study and the bubble study was negative. The echocardiogram suggested a left atrial mass. Cardiology was consulted and their recommendation was a cardiac MRI.  However, given her previous  difficulties with the MRI scanner and the fact that there is no evidence of cerebral emboli and her limited prognosis, it was felt this was not appropriate  I have discussed sending her back to [facility] without further testing or treatments and the family is in agreement. I also broached the possibility of Palliative care consultation with discussion about code status and whether or not she will be further treated or hospitalized.     Activity - Up with nursing assistance     Full Code     Fall precautions     Advance Diet as Tolerated   Follow this diet upon discharge: Orders Placed This Encounter     Room Service     Combination Diet Dysphagia Diet Level 2: Mechan Altered; Thin Liquids (water, ice chips, juice, milk gelatin, ice cream, etc)       Discharge Medications   Current Discharge Medication List      CONTINUE these medications which have NOT CHANGED    Details   bisacodyl (DULCOLAX) 10 MG Suppository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 nonionic contrast and  20 mL saline flush.  These images were processed on a separate 3-D  workstation.    COMPARISON: None.    CT HEAD FINDINGS:  No contrast enhancing lesions. CT perfusion images  are limited due to motion artifact.    CT ANGIOGRAM HEAD FINDINGS:  The major intracranial arteries including  the  proximal branches of the anterior cerebral, middle cerebral, and  posterior cerebral arteries appear patent without vascular cutoff. No  aneurysm identified. No significant stenosis. The P1 segment of the  left posterior cerebral artery is not visualized and is likely  hypoplastic or congenitally absent. The left posterior cerebral artery  is supplied by the patent left posterior communicating artery. Venous  circulation is unremarkable.     CT ANGIOGRAM NECK FINDINGS:   Normal origin of the great vessels from the aortic arch.    Right carotid artery: The right common and internal carotid arteries  are patent.  No significant stenosis.  Torturous right common and  internal carotid arteries with a retropharyngeal course.    Left carotid artery: The left common and internal carotid arteries are  patent.  No significant stenosis.  Tortuous left common and internal  carotid arteries with a retropharyngeal course.    Vertebral arteries:  Vertebral arteries are patent without evidence of  dissection.  No significant stenosis.      Other findings: Degenerative changes in the spine.      Impression    IMPRESSION:   1. Patent arteries in the head and neck without vascular cutoff. No  evidence of dissection. No aneurysm identified. No significant  stenosis.  2. CT perfusion images of the head are limited due to motion artifact.    Results discussed with Carli Shoemaker at 3:45 PM on 11/3/2017.    CHERRI MIN MD   Troponin I   Result Value Ref Range    Troponin I ES <0.015 0.000 - 0.045 ug/L   CRP inflammation   Result Value Ref Range    CRP Inflammation 3.4 0.0 - 8.0 mg/L   Hemoglobin A1c   Result Value Ref Range    Hemoglobin A1C 5.6 4.3 - 6.0 %   Lipid panel reflex to direct LDL   Result Value Ref Range    Cholesterol 206 (H) <200 mg/dL    Triglycerides 83 <150 mg/dL    HDL Cholesterol 49 (L) >49 mg/dL    LDL Cholesterol Calculated 140 (H) <100 mg/dL    Non HDL Cholesterol 157 (H) <130 mg/dL   TSH with free T4 reflex    Result Value Ref Range    TSH 3.20 0.40 - 4.00 mU/L   Glucose by meter   Result Value Ref Range    Glucose 61 (L) 70 - 99 mg/dL   Troponin I   Result Value Ref Range    Troponin I ES <0.015 0.000 - 0.045 ug/L   Glucose by meter   Result Value Ref Range    Glucose 114 (H) 70 - 99 mg/dL   Glucose by meter   Result Value Ref Range    Glucose 71 70 - 99 mg/dL   Glucose by meter   Result Value Ref Range    Glucose 75 70 - 99 mg/dL   MR Brain w/o Contrast    Narrative    MR BRAIN W/O CONTRAST 11/4/2017 10:15 AM     HISTORY: Seizures/cva    TECHNIQUE: Multiplanar, multisequence MRI of the brain without IV  contrast material.    COMPARISON: None.    FINDINGS: Patient motion degrades the quality of this study.  There is  generalized atrophy of the brain.  White matter changes are present in  the cerebral hemispheres that are consistent with small vessel  ischemic disease in this age patient. There is a small questionable  area of restricted diffusion in the region of the right thalamus. This  is seen on axial image 11 of series 702. This could represent a small  lacunar type infarct.. . The facial structures appear normal. The  arteries at the base of the brain and the dural venous sinuses appear  patent.      Impression    IMPRESSION:   1. Significant patient motion artifact.  2. Question small lacunar type infarct in the region of the right  thalamus. Correlation with clinical symptoms suggested. No large  cortical infarct identified.  3. Age-related changes including generalized atrophy of the brain.  White matter changes in the cerebral hemispheres consistent with small  vessel ischemic disease in this age patient.    JUNI WILKINS MD   Glucose by meter   Result Value Ref Range    Glucose 81 70 - 99 mg/dL[SP1.1]            Revision History        User Key Date/Time User Provider Type Action    > SP1.3 11/4/2017  1:28 PM Gilles Crews MD Physician Sign     SP1.2 11/4/2017  1:24 PM Gilles Crews MD Physician       SP1.1 11/4/2017  1:00 PM Gilles Crews MD Physician                      Progress Notes - Physician (Notes from 11/03/17 through 11/06/17)      Progress Notes by Jovana Green LSW at 11/6/2017 11:16 AM     Author:  Jovana Green LSW Service:  Social Work Author Type:      Filed:  11/6/2017 11:28 AM Date of Service:  11/6/2017 11:16 AM Creation Time:  11/6/2017 11:16 AM    Status:  Signed :  Jovana Green LSW ()         SW:  D: Pt admitted on 11/3/17 from [facility] with CVA. Pt cleared for discharge back to [facility] today.  I: INGRID contacted [place] at [facility] to confirm pt's return today  Discharge orders faxed to [phone] per request.  Transportation ordered though [facility] for stretcher ride at 12:30 pm today. Pt requiring 2 person or lift for mobility. Pt is aphasic and has sever frontal lobe dementia requiring supervision during transport for safety.  PCS form completed, faxed and placed on pt chart.    CC, RN, HUC,BB, [facility] and pt's family updated with discharge plan.  No PAS required.    RYLAN Singh  [facility]  Phone: [phone][SC1.1]      Revision History        User Key Date/Time User Provider Type Action    > SC1.1 11/6/2017 11:28 AM Jovana Green LSW  Sign            Progress Notes by Hernesto Torres MD at 11/6/2017 10:21 AM     Author:  Hernesto Torres MD Service:  Cardiology Author Type:  Physician    Filed:  11/6/2017 10:25 AM Date of Service:  11/6/2017 10:21 AM Creation Time:  11/6/2017 10:21 AM    Status:  Signed :  Hernesto Torres MD (Physician)         Dr Kimball called me on this patient. She was unable to have an MRI of her head for stroke on this admission because of confusion and l;ack of cooperation . He informs that patient has significant frontal lobe dementia and overall has a poorer prognosis and unlikely that one would act on the  results of an MRI even if this could be done. Please let me know if one wants to continue with cardiac conusltation as it does appear that MRI would be unlikely to be successfully performed on this patient who has significant other medical issues. Thanks[KF1.1]     Revision History        User Key Date/Time User Provider Type Action    > KF1.1 11/6/2017 10:25 AM Hernesto Torres MD Physician Sign            Progress Notes by Bere Molina MD at 11/6/2017  8:35 AM     Author:  Bere Molina MD Service:  Neurology Author Type:  Physician    Filed:  11/6/2017  8:53 AM Date of Service:  11/6/2017  8:35 AM Creation Time:  11/6/2017  8:35 AM    Status:  Addendum :  Bere Molina MD (Physician)         [facility]  Neurology Progress Note          Assessment and Plan:[SB1.1]   1. Dementia  Not new at least since 2015 probably before  FTD?  Severe aphasia and unlikely to get better, prognosis is poor    2. Seziures  Recent diagnosis while at [facility], on Keppra continue    3. New symptoms of weakness-  Not clear of etiology  MRI suggested possibe right thalamic infarct  Continue aspirin 81 mg daily (decreased form 325)  Echo suggested a possible mass in left atrium I have ordered cardi[SB1.2]ology consult[SB1.3] to further assess[SB1.2] whether we need an MRI as report recommended[SB1.3]-but I think at this time it might warrant a pause and conversation with pt's decision makers. She has an incurable progressive neurological condition (dementia) would we treat a thrombus or cardiac mass aggressively. If answer is no consider palliative care consult    I spoke with nurse no family present. She will pass on concerns I have to Dr Kimball    I believe Dr Phoenix in [place] office is following pt as outpatient and she can see him in follow up.[SB1.2]       Attestation:  I have reviewed today's vital signs, medications, labs and imaging.           Interval History:[SB1.1]   No change per nursing.[SB1.2]             Review of Systems:   Review of systems not obtained due to patient factors - confusion          Medications:     Current Facility-Administered Medications Ordered in Epic   Medication Dose Route Frequency Last Rate Last Dose     levETIRAcetam (KEPPRA) solution 1,000 mg  1,000 mg Oral BID   1,000 mg at 11/05/17 2016     glucose 40 % gel 15-30 g  15-30 g Oral Q15 Min PRN        Or     dextrose 50 % injection 25-50 mL  25-50 mL Intravenous Q15 Min PRN   25 mL at 11/04/17 0235    Or     glucagon injection 1 mg  1 mg Subcutaneous Q15 Min PRN         miconazole (MICATIN; MICRO GUARD) 2 % powder   Topical Q1H PRN         benztropine (COGENTIN) tablet 1-2 mg  1-2 mg Oral TID PRN         OLANZapine (zyPREXA) injection 2.5 mg  2.5 mg Intramuscular Q6H PRN         cholecalciferol (vitamin D3) tablet 2,000 Units  2,000 Units Oral Daily   2,000 Units at 11/05/17 0918     DULoxetine (CYMBALTA) EC capsule 30 mg  30 mg Oral Daily   30 mg at 11/05/17 0918     gabapentin (NEURONTIN) capsule 100 mg  100 mg Oral TID   100 mg at 11/05/17 2016     OLANZapine zydis (zyPREXA) ODT half-tab 2.5 mg  2.5 mg Oral BID   2.5 mg at 11/05/17 2016     naloxone (NARCAN) injection 0.1-0.4 mg  0.1-0.4 mg Intravenous Q2 Min PRN         Medication Instruction   Does not apply Continuous PRN         lidocaine 1 % 1 mL  1 mL Other Q1H PRN         lidocaine (LMX4) cream   Topical Q1H PRN         sodium chloride (PF) 0.9% PF flush 3 mL  3 mL Intracatheter Q1H PRN         acetaminophen (TYLENOL) tablet 650 mg  650 mg Oral Q4H PRN   650 mg at 11/04/17 1506     acetaminophen (TYLENOL) Suppository 650 mg  650 mg Rectal Q4H PRN         potassium chloride SA (K-DUR/KLOR-CON M) CR tablet 20-40 mEq  20-40 mEq Oral Q2H PRN         potassium chloride (KLOR-CON) Packet 20-40 mEq  20-40 mEq Oral or Feeding Tube Q2H PRN         potassium chloride 10 mEq in 100 mL sterile water intermittent infusion  (premix)  10 mEq Intravenous Q1H PRN         potassium chloride 10 mEq in 100 mL intermittent infusion with 10 mg lidocaine  10 mEq Intravenous Q1H PRN         magnesium sulfate 4 g in 100 mL sterile water (premade)  4 g Intravenous Q4H PRN         HYDROmorphone (PF) (DILAUDID) injection 0.2 mg  0.2 mg Intravenous Q2H PRN         senna-docusate (SENOKOT-S;PERICOLACE) 8.6-50 MG per tablet 1 tablet  1 tablet Oral BID PRN        Or     senna-docusate (SENOKOT-S;PERICOLACE) 8.6-50 MG per tablet 2 tablet  2 tablet Oral BID PRN         polyethylene glycol (MIRALAX/GLYCOLAX) Packet 17 g  17 g Oral Daily PRN         bisacodyl (DULCOLAX) Suppository 10 mg  10 mg Rectal Daily PRN         ondansetron (ZOFRAN-ODT) ODT tab 4 mg  4 mg Oral Q6H PRN        Or     ondansetron (ZOFRAN) injection 4 mg  4 mg Intravenous Q6H PRN         prochlorperazine (COMPAZINE) injection 5 mg  5 mg Intravenous Q6H PRN        Or     prochlorperazine (COMPAZINE) tablet 5 mg  5 mg Oral Q6H PRN        Or     prochlorperazine (COMPAZINE) Suppository 12.5 mg  12.5 mg Rectal Q12H PRN         lidocaine 1 % 1 mL  1 mL Other Q1H PRN         lidocaine (LMX4) cream   Topical Q1H PRN         sodium chloride (PF) 0.9% PF flush 3 mL  3 mL Intracatheter Q1H PRN         sodium chloride (PF) 0.9% PF flush 3 mL  3 mL Intracatheter Q8H   3 mL at 11/06/17 0215     aspirin EC EC tablet 325 mg  325 mg Oral Daily   325 mg at 11/05/17 0918    Or     aspirin Suppository 300 mg  300 mg Rectal Daily   300 mg at 11/04/17 1035     labetalol (NORMODYNE/TRANDATE) injection 10-40 mg  10-40 mg Intravenous Q10 Min PRN         hydrALAZINE (APRESOLINE) injection 10-20 mg  10-20 mg Intravenous Q1H PRN         LORazepam (ATIVAN) injection 2 mg  2 mg Intravenous Q3 Min PRN         No current Epic-ordered outpatient prescriptions on file.[SB1.1]      PE[SB1.2]  /58 (BP Location: Right arm)  Pulse 81  Temp 98.4  F (36.9  C) (Axillary)  Resp 16  Wt 113.9 kg (251 lb)  SpO2 95%   BMI 40.51 kg/m2[SB1.4]  Gen: awake, nonverbal, no cooperative with exam  CV: RRR  Chest: CTA B  Neuro: Non verbal, does follow commands, face symmetric, EOMI, PERRL, moves all four ext voluntarily but with poor effort.   Ext: Mild edema[SB1.2]          Data:     Results for orders placed or performed during the hospital encounter of 17 (from the past 24 hour(s))   Echo Complete Bubble    Narrative    [id number]  [facility]  [id number]  [id number]^ALEISHA^LISBET^A           [facility]  Echocardiography Laboratory  [address] [place] [address]        Name: VILMA MARTINEZ  MRN: [id number]  : 1950  Study Date: 2017 09:54 AM  Age: 67 yrs  Gender: Female  Patient Location: [facility]  Reason For Study: Cerebrovascular Incident  Ordering Physician: LISBET MOARES  Referring Physician: Brianna Crespo  Performed By: Katharina Taylor RDCS     BSA: 2.2 m2  Height: 66 in  Weight: 251 lb  HR: 80  BP: 124/80 mmHg  _____________________________________________________________________________  __        Procedure  Complete Portable Bubble Echo Adult.  _____________________________________________________________________________  __        Interpretation Summary     Echoes are present in the left atrium suggestive of left atrial mass, I think  it is calcified thickening in the lateral wall consider MRI  A contrast injection (Bubble Study) was performed that was negative for flow  across the interatrial septum.  Sinus rhythm was noted.  There is no comparison study available.  _____________________________________________________________________________  __        Left Ventricle  The left ventricle is normal in size. There is normal left ventricular wall  thickness. The visual ejection fraction is estimated at 55-60%. Grade I or  early diastolic dysfunction. Normal left ventricular wall motion.     Right Ventricle  The right ventricle is normal in structure, function and  size.     Atria  Normal left atrial size. Echoes are present in the left atrium suggestive of  left atrial mass. Right atrial size is normal. Intact atrial septum. A  contrast injection (Bubble Study) was performed that was negative for flow  across the interatrial septum.     Mitral Valve  Thickened mitral valve posterior leaflet. There is mild mitral annular  calcification. There is trace to mild mitral regurgitation.        Tricuspid Valve  The tricuspid valve is normal in structure and function. Right ventricular  systolic pressure could not be approximated due to inadequate tricuspid  regurgitation.     Aortic Valve  The aortic valve is trileaflet with aortic valve sclerosis.     Vessels  Normal size aorta. Normal size ascending aorta. The IVC is normal in size and  reactivity with respiration, suggesting normal central venous pressure.     Pericardium  The pericardium appears normal.     Rhythm  Sinus rhythm was noted.     _____________________________________________________________________________  __  MMode/2D Measurements & Calculations  IVSd: 1.1 cm  LVIDd: 4.1 cm  LVIDs: 3.0 cm  LVPWd: 1.2 cm  FS: 25.9 %  EDV(Teich): 74.8 ml  ESV(Teich): 36.4 ml  LV mass(C)d: 155.5 grams  LV mass(C)dI: 70.6 grams/m2     Ao root diam: 3.2 cm  LA dimension: 3.2 cm  LA/Ao: 1.0  LVOT diam: 1.9 cm  LVOT area: 2.8 cm2  RWT: 0.57        Doppler Measurements & Calculations  MV E max edin: 76.2 cm/sec  MV A max edin: 112.0 cm/sec  MV E/A: 0.68  MV dec slope: 340.1 cm/sec2  E/E' av.1  Lateral E/e': 8.8  Medial E/e': 13.4              _____________________________________________________________________________  __        Report approved by: Zoraida Atkinson 2017 01:12 PM      Glucose by meter   Result Value Ref Range    Glucose 102 (H) 70 - 99 mg/dL   Glucose by meter   Result Value Ref Range    Glucose 92 70 - 99 mg/dL   Glucose by meter   Result Value Ref Range    Glucose 116 (H) 70 - 99 mg/dL   Glucose by meter    Result Value Ref Range    Glucose 82 70 - 99 mg/dL   Glucose by meter   Result Value Ref Range    Glucose 78 70 - 99 mg/dL   Glucose by meter   Result Value Ref Range    Glucose 77 70 - 99 mg/dL     -  -[SB1.1]Bere Molina MD  [facility]  11/6/2017 8:47 AM[SB1.2]           Revision History        User Key Date/Time User Provider Type Action    > SB1.3 11/6/2017  8:53 AM Bere Molina MD Physician Addend     SB1.4 11/6/2017  8:48 AM Bere Molian MD Physician Sign     SB1.2 11/6/2017  8:41 AM Bere Molina MD Physician      SB1.1 11/6/2017  8:35 AM Bere Molina MD Physician             Progress Notes by Derek Kimball MD at 11/5/2017  1:05 PM     Author:  Derek Kimball MD Service:  Internal Medicine Author Type:  Physician    Filed:  11/5/2017  1:16 PM Date of Service:  11/5/2017  1:05 PM Creation Time:  11/5/2017  1:05 PM    Status:  Signed :  Derek Kimball MD (Physician)         [facility]    Hospitalist Progress Note    Date of Service (when I saw the patient): 11/05/2017    Assessment & Plan   Zahira Sutherland is a 67 year old female who was admitted on 11/3/2017. 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    Derek Kimball MD    Interval History   No history. Is eating and needs to be fed.  No response to questions about pain or dyspnea, no vomiting seen. She does not appear to be in distress    -Data reviewed today: I reviewed all new labs  and imaging results over the last 24 hours. I personally reviewed no images or EKG's today.    Physical Exam   Temp: 97.7  F (36.5  C) Temp src: Oral BP: 116/64 Pulse: 62 Heart Rate: 58 Resp: 16 SpO2: 95 % O2 Device: None (Room air)    Vitals:    11/03/17 1427 11/04/17 0538   Weight: 115 kg (253 lb 8.5 oz) 113.9 kg (251 lb)     Vital Signs with Ranges  Temp:  [97.4  F (36.3  C)-98.8  F (37.1  C)] 97.7  F (36.5  C)  Pulse:  [62] 62  Heart Rate:  [50-58] 58  Resp:  [16-18] 16  BP: (107-127)/(58-77) 116/64  SpO2:  [91 %-95 %] 95 %  I/O last 3 completed shifts:  In: 2520 [P.O.:470; I.V.:2050]  Out: -     Constitutional: Awake, says yes when I say hello, nothing else intelligible and speech quickly falls off  Respiratory: Clear to ausculation  Cardiovascular: regular rhythm, normal S1 and S2, no murmurs or S3, no edema.   GI: normal bowel sounds, not tender  Skin/Integumen: no lesions  Other:  little speech and no facial drooping doesn't track with eyes and doesn't follow commands. Minimal movement of extremities and what she has seems symmetric. Echocardiogram has been done, no report yet.     Medications     - MEDICATION INSTRUCTIONS -         levETIRAcetam  1,000 mg Oral BID     cholecalciferol  2,000 Units Oral Daily     DULoxetine (CYMBALTA) EC capsule 30 mg  30 mg Oral Daily     gabapentin (NEURONTIN) capsule 100 mg  100 mg Oral TID     OLANZapine zydis (zyPREXA) ODT half-tab 2.5 mg  2.5 mg Oral BID     sodium chloride (PF)  3 mL Intracatheter Q8H     aspirin EC  325 mg Oral Daily    Or     aspirin  300 mg Rectal Daily       Data     Recent Labs  Lab 11/04/17  0210 11/03/17  1924 11/03/17  1415   WBC  --   --  7.7   HGB  --   --  15.2   MCV  --   --  98   PLT  --   --  161   INR  --   --  0.99   NA  --   --  144   POTASSIUM  --   --  4.0   CHLORIDE  --   --  107   CO2  --   --  30   BUN  --   --  14   CR  --   --  0.90   ANIONGAP  --   --  7   EVY  --   --  9.5   GLC  --   --  109*   TROPI <0.015 <0.015  --   "      Imaging:  No results found for this or any previous visit (from the past 24 hour(s)).[DW1.1]       Revision History        User Key Date/Time User Provider Type Action    > DW1.1 11/5/2017  1:16 PM Derek Kimball MD Physician Sign            Progress Notes by Gilles Crews MD at 11/5/2017  9:46 AM     Author:  Gilles Crews MD Service:  Neurology Author Type:  Physician    Filed:  11/5/2017  9:49 AM Date of Service:  11/5/2017  9:46 AM Creation Time:  11/5/2017  9:46 AM    Status:  Signed :  Gilles Crews MD (Physician)         Neurology Progress Note    S: Has been eating with assistance. Says \"good morning\" today in response to my greeting.  O: VSS afebrile. Awake, no apparent weakness. She slurred \"good morning\" an dshe tracks, but does not respond or follow commands. No apparent focal weakness today.  A: Awaiting ECHO for completion of stroke work up. Suspect FTLD. Possibly a thalamic infarct may have contributed to the recent decline.  TERESO Crews MD[SP1.1]     Revision History        User Key Date/Time User Provider Type Action    > SP1.1 11/5/2017  9:49 AM Gilles Crews MD Physician Sign            ED Provider Notes by Carli Shoemaker MD at 11/3/2017  2:05 PM     Author:  Carli Shoemaker MD Service:  Emergency Medicine Author Type:  Physician    Filed:  11/5/2017  1:21 AM Date of Service:  11/3/2017  2:05 PM Creation Time:  11/3/2017  2:10 PM    Status:  Signed :  Carli Shoemaker MD (Physician)           History     Chief Complaint:  Facial Asymmetry and One-Sided Weakness     HPI The history is obtained primarily through EMS and is limited due to the patient's mental status changes.     Zahira Sutherland is a[CH1.1] full code[CH1.2] 67 year old female with a history of diabetes, hypertension, and dementia who presents via EMS for evaluation of facial asymmetry and one-sided weakness. The patient is currently living at the [facility]" facility and is reportedly conversant at baseline. This morning around 1000, staff at the patient's facility noticed that she was leaning to the left, which is abnormal for her. Around 1200, they additionally noticed that she had developed left-sided facial drooping[CH1.1] and was having difficulties speaking[CH1.2]. At 1330, they called EMS to have her brought into the ED for evaluation with primary concern that she could be having a stroke. On EMS' evaluation, the patient had a blood sugar of 140[CH1.1] and they report that[CH1.3] s[CH1.4]he had decreased  strength in h[CH1.3]er[CH1.4] right hand.[CH1.3] Currently in the ED, the patient is not able to provide additional history.[CH1.4]     Allergies:  Penicillins      Medications:    levETIRAcetam 1000 MG TABS  aspirin 81 MG chewable tablet  GABAPENTIN PO  nystatin (MYCOSTATIN) 260318 UNIT/GM POWD  OLANZAPINE PO  DULOXETINE HCL PO  cholecalciferol 2000 UNITS CAPS  Multiple Vitamin (MULTIVITAMIN) per tablet    Past Medical History:    Dementia  Diabetes[CH1.1] mellitus, type II[CH1.4]  Hypertension[CH1.1]   Depression[CH1.4]     Past Surgical History:    Hernia repair  Cholecystectomy  Gastric bypass   Back surgery     Family History:    Obesity - Mother, father, brother, and sister   Cerebrovascular disease - Mother  Cancer, breast - Mother  Cancer, colorectal - Father   Heart disease - Mother  Diabetes - Father   Hypertension - Sister     Social History:  Tobacco use:    Never smoker  Alcohol use:    Negative  Marital status:       Accompanied to ED by:  EMS   Living situation:   The patient lives at the [facility]      Review of Systems   Unable to perform ROS: Mental status change     Physical Exam   First Vitals:[CH1.1]  BP: (!) 120/95  Pulse: 90  Heart Rate: 53  Temp: 98  F (36.7  C)  Resp: 12  Weight: 115 kg (253 lb 8.5 oz)  SpO2: 97 %[CH1.5]      Physical Exam[CH1.1]  General: Resting on the gurney, appears  uncomfortable  Head:  The scalp, face, and head appear normal  Mouth/Throat: Mucus membranes are moist  CV:  Regular rate    Normal S1 and S2  No pathological murmur   Resp:  Breath sounds clear and equal bilaterally    Non-labored, no retractions or accessory muscle use    No coarseness    No wheezing   GI:  Abdomen is soft, no rigidity    No tenderness to palpation  MS:  Good capillary refill noted.  Skin:  No rash or lesions noted.  Neuro: Right hand grasp slightly diminished. Slumping to the left.     Facial asymmetry present.     Bilateral lower extremity weakness. Lowe[CH1.6]r[KD1.1] extremity strength is symmetric.     Speech is aphasic, unable to name basic objects such as pen or paper.     Speech is slurred and difficult to understand.    The patient does not follow commands reliably.   Psych:  Awake. Alert.[CH1.6]      Emergency Department Course     Imaging:[CH1.1]  Radiographic findings were communicated with the patient and family who voiced understanding of the findings.    CTA Angiogram Head Neck:  IMPRESSION:   1. Patent arteries in the head and neck without vascular cutoff. No evidence of dissection. No aneurysm identified. No significant stenosis.  2. CT perfusion images of the head are limited due to motion artifact.  Per radiology.     CT Head w Contrast:  IMPRESSION:   1. Patent arteries in the head and neck without vascular cutoff. No evidence of dissection. No aneurysm identified. No significant stenosis.  2. CT perfusion images of the head are limited due to motion artifact.  Per radiology.     CT Head w/o Contrast:  IMPRESSION:   1. Nothing acute.  2. Atrophy.  3. CT angiogram to follow.  Per radiology.[CH1.5]     Laboratory:[CH1.1]  CBC: WNL (WBC 7.7, HGB 15.2, )  BMP: Glucose 109 high, o/w WNL (Creatinine 0.90)    INR: 0.99  Partial thromboplastin time: 35   Keppra level: Pending[CH1.5]     Interventions:[CH1.1]  1548 NS 1,000 mL IV  1633 Aspirin 300 mg Rectal[CH1.5]     Emergency  Department Course:  Patient was brought to the ED via EMS.     Nursing notes and vitals reviewed.  1405: I performed an exam of the patient as documented above.     1407: Code stroke.     1411: I spoke with [CH1.1] Mars[CH1.7] of the neurology service regarding patient's presentation, findings, and plan of care.[CH1.1]    1422: I spoke to the patient's daughter over the phone.[CH1.2]     1535: I spoke with Dr. Crews of the neurology service regarding patient's presentation, findings, and plan of care.[CH1.7]    1604: I updated the patient's family.     1649: I spoke with Dr. Junior of the hospitalist service regarding patient's presentation, findings, and plan of care.[CH1.8]     Findings and plan explained to the Patient who consents to admission. Discussed the patient with Dr. Junior, who will admit the patient to a neuro bed for further monitoring, evaluation, and treatment.[CH1.5]     Impression & Plan      Medical Decision Making:[CH1.1]  Zahira Sutherland is a 67 year old female presents for evaluation of weakness, left-sided facial droop, speech difficulty, and confusion.  The patient's symptoms started at 1000 and they are not in the window for TPA.   A Code Stroke was called, based on symptoms and timing.  The patient's symptoms are currently[CH1.5] more severe[CH1.4] compared to when they first started. Per my evaluation and neurology recommendations, TPA was not given.[CH1.5]  She was a potential thrombectomy candidate, however no large thrombus was seen on CT.[CH1.4] Other etiologies for the patient's symptoms, such as hypo/hyperglycemia, anemia, encephalitis, electrolyte abnormality, seizure, etc were considered, and evaluated as appropriate, however, stroke is more likely.  The patient was seen by neurology who agrees with this assessment.  The patient is to be admitted to the hospitalist for further management and treatment.    Disposition:  The patient is admitted in[CH1.5] stable[CH1.4]  condition.[CH1.5]      Critical Care time:[CH1.1]  was 15 minutes for this patient excluding procedures.[CH1.4]    Diagnosis:[CH1.1]    ICD-10-CM   1. 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 Junior.[CH1.5]       I Scott Med, am serving as a scribe at 2:05 PM on 11/3/2017 to document services personally performed by Dr. Shoemaker, based on my observations and the provider's statements to me.     EMERGENCY DEPARTMENT[CH1.1]       Carli Shoemaker MD  11/05/17 0121  [KD1.2]     Revision History        User Key Date/Time User Provider Type Action    > KD1.2 11/5/2017  1:21 AM Carli Shoemaker MD Physician Sign     KD1.1 11/5/2017  1:20 AM Carli Shoemaker MD Physician      CH1.4 11/3/2017  8:33 PM Med, Rakan Scribe Share     CH1.5 11/3/2017  5:39 PM Med, Rakan Scribe Share     CH1.6 11/3/2017  4:59 PM Jovel, Rakan Scribe Share     CH1.8 11/3/2017  4:49 PM Jovel, Rakan Scribe Share     CH1.7 11/3/2017  3:40 PM Med, Rakan Scribe Share     CH1.3 11/3/2017  3:38 PM Med, Rakan Scribe Share     [N/A] 11/3/2017  3:10 PM Jovel, Rakan Scribe Share     [N/A] 11/3/2017  2:22 PM Med, Rakan Scribe Share     CH1.2 11/3/2017  2:18 PM Jovel, Rakan Scribe Share     CH1.1 11/3/2017  2:16 PM Med, Rakan Scribe Share            Progress Notes by Neris Winn SLP at 11/4/2017 12:38 PM     Author:  Neris Winn SLP Service:  Acute IP Rehab Author Type:  Speech Language Pathologist    Filed:  11/4/2017 12:38 PM Date of Service:  11/4/2017 12:38 PM Creation Time:  11/4/2017 12:38 PM    Status:  Signed :  Neris Winn SLP (Speech Language Pathologist)            11/04/17 1200   General Information   Onset Date 11/03/17   Start of Care Date 11/04/17   Referring Physician Junior, Leonel LOPEZ   Patient Profile Review/OT: Additional Occupational Profile Info See Profile for full history and prior level of  function   Patient/Family Goals Statement none stated   Swallowing Evaluation Bedside swallow evaluation   Behaviorial Observations Distractible;Initiation problems   Mode of current nutrition NPO   Comments Per MD note:Zahira Sutherland is a full code 67 year old female with a history of diabetes, hypertension, and dementia who presents via EMS for evaluation of facial asymmetry and one-sided weakness. The patient is currently living at the [facility] and is reportedly conversant at baseline. This morning around 1000, staff at the patient's facility noticed that she was leaning to the left, which is abnormal for her. Around 1200, they additionally noticed that she had developed left-sided facial drooping and was having difficulties speaking.  Therapy has spoken with the facility, who report the pt can respond to y/n questions and eats finger foods. In separate note, Dr Kimball noted that the pt has been known to see assistance with eating.    Clinical Swallow Evaluation   Oral Musculature unable to assess due to poor participation/comprehension   Structural Abnormalities none present   Dentition present and adequate   Oral Musculature Comments Poor participation/following directions   Clinical Swallow Eval: Thin Liquid Texture Trial   Mode of Presentation, Thin Liquids cup   Volume of Liquid or Food Presented 4 tsp   Oral Phase of Swallow WFL   Pharyngeal Phase of Swallow intact   Diagnostic Statement No impairment   Clinical Swallow Eval: Puree Solid Texture Trial   Mode of Presentation, Puree spoon   Volume of Puree Presented 2 tsp   Oral Phase, Puree WFL   Pharyngeal Phase, Puree intact   Diagnostic Statement No impairment   Clinical Swallow Eval: Solid Food Texture Trial   Mode of Presentation, Solid fed by clinician   Volume of Solid Food Presented 1 bite   Oral Phase, Solid WFL   Pharyngeal Phase, Solid intact   Diagnostic Statement No impairment   Esophageal Phase of Swallow   Patient  reports or presents with symptoms of esophageal dysphagia No   Swallow Eval: Clinical Impressions   Skilled Criteria for Therapy Intervention No problems identified which require skilled intervention   Functional Assessment Scale (FAS) 7   Diet texture recommendations Dysphagia diet level 2;Thin liquids   Recommended Feeding/Eating Techniques small sips/bites;maintain upright posture during/after eating for 30 mins   Risks and Benefits of Treatment have been explained. Yes   Patient, family and/or staff in agreement with Plan of Care Yes   Clinical Impression Comments Bedside swallow eval completed: Pt is likely at baseline for swallowing and speech, and is demonstrating similar behaviors with PO intake as previous swallow eval on 10/2/17. No overt s/sx of aspiration with thins, purees or cracker. No worrisome oral residue. Minimal delay in hyolaryngeal elevation onset time that does not appear to affect safety of swallowing. Needs 1:1 assist with all PO intake as she is likely to spill or lack initiation to eat independently. Y/N questions asked, but pt most of the time stared blankly. One time she nodded yes, correctly, in response to a question. She did start speaking but only a few short words/phrases, mostly when staff spoke about her daughter. REC: Dysphagia diet 2 and thins. 1:1 assist. Pt is likely at baseline based on what we have heard from staff at the pt's LTC facility. Meds crushed in purees. Standard aspiration precautions recommended. No further speech therapy needs anticipated.    Total Evaluation Time   Total Evaluation Time (Minutes) 25[AP1.1]        Revision History        User Key Date/Time User Provider Type Action    > AP1.1 11/4/2017 12:38 PM Neris Winn, SLP Speech Language Pathologist Sign            Progress Notes by Derek Kimball MD at 11/4/2017 10:55 AM     Author:  Derek Kimball MD Service:  Internal Medicine Author Type:  Physician    Filed:  11/4/2017 11:19 AM Date of  Service:  11/4/2017 10:55 AM Creation Time:  11/4/2017 10:55 AM    Status:  Signed :  Derek Kimball MD (Physician)         [facility]    Hospitalist Progress Note    Date of Service (when I saw the patient): 11/04/2017    Assessment & Plan   Zahira Sutherland is a 67 year old female who was admitted on 11/3/2017. 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    Derek Kimball MD    Interval History   Ms. Sutherladn is a 66 yo woman who lives at an [facility] who has had a seizure disorder diagnosed last month at [facility].  She is unable to give me a history and from the chart she may or may not have an expressive aphasia that preceeds this admission.  She was noted to have a left sided facial droop yesterday AM and some weakness. I attempted to call her daughters and neither answer their cell phones, I will try later today. I called [facility] and spoke to a nurse ([place]), she says that the aphasia is not new, she is wheelchair bound for a long while, is the recent past (about a month or so) she can't feed herself as well. She has not had previous problems with swallowing, She reports that she choked on her food 2 days ago and she has a flat affect but Albina has not noted any facial weakness.     -Data reviewed today: I reviewed all new labs and imaging results over the last 24 hours. I personally reviewed the brain MRI image(s) showing a right thalamic  infarction.    Physical Exam   Temp: 98.2  F (36.8  C) Temp src: Oral BP: 111/61 Pulse: 55 Heart Rate: 54 Resp: 16 SpO2: 96 % O2 Device: None (Room air)    Vitals:    11/03/17 1427 11/04/17 0538   Weight: 115 kg (253 lb 8.5 oz) 113.9 kg (251 lb)     Vital Signs with Ranges  Temp:  [97.6  F (36.4  C)-98.2  F (36.8  C)] 98.2  F (36.8  C)  Pulse:  [54-90] 55  Heart Rate:  [51-64] 54  Resp:  [10-19] 16  BP: (109-143)/() 111/61  SpO2:  [91 %-100 %] 96 %  I/O last 3 completed shifts:  In: 1129 [I.V.:1129]  Out: -     Constitutional: Lethargic, not speaking   Respiratory: Lungs are clear to auscultation  Cardiovascular: regular rhythm, normal S1 and S2, no S3 or murmurs, no edema  GI: normal bowel sounds, not tender   Skin/Integumen: face is mark, possible rosaeca   Other:  subtle left nasolabial fold flattening, Limited response to following commands. Speech limited and slurred.  Moves both arms in a limited manner. A possible upgoing Babinski on the left. Some foot movement, not much more.     Medications     NaCl 100 mL/hr at 11/04/17 1042     - MEDICATION INSTRUCTIONS -         cholecalciferol  2,000 Units Oral Daily     DULoxetine (CYMBALTA) EC capsule 30 mg  30 mg Oral Daily     gabapentin (NEURONTIN) capsule 100 mg  100 mg Oral TID     OLANZapine zydis (zyPREXA) ODT half-tab 2.5 mg  2.5 mg Oral BID     sodium chloride (PF)  3 mL Intracatheter Q8H     sodium chloride (PF)  3 mL Intracatheter Q8H     aspirin EC  325 mg Oral Daily    Or     aspirin  300 mg Rectal Daily     levETIRAcetam  1,000 mg Intravenous Q12H       Data     Recent Labs  Lab 11/04/17  0210 11/03/17  1924 11/03/17  1415   WBC  --   --  7.7   HGB  --   --  15.2   MCV  --   --  98   PLT  --   --  161   INR  --   --  0.99   NA  --   --  144   POTASSIUM  --   --  4.0   CHLORIDE  --   --  107   CO2  --   --  30   BUN  --   --  14   CR  --   --  0.90   ANIONGAP  --   --  7   EVY  --   --  9.5   GLC  --   --  109*   TROPI <0.015 <0.015  --         Imaging:  Recent Results (from the past 24 hour(s))   CT Head w/o Contrast    Narrative    CT HEAD W/O CONTRAST  11/3/2017 2:39 PM    HISTORY: Code stroke. Listing to the left side in the nursing  facility. Right facial droop.    TECHNIQUE: Scans were obtained through the head without IV contrast.   Radiation dose for this scan was reduced using automated exposure  control, adjustment of the mA and/or kV according to patient size, or  iterative reconstruction technique.    COMPARISON: 10/04/2017.    FINDINGS: Moderate atrophy. Mild low-density changes in the white  matter both hemispheres consistent with chronic small vessel ischemic  disease.  No hemorrhage, mass lesion, or focal area of acute  infarction identified. Paranasal sinuses are normal. No bony  abnormality. No interval change. CT angiogram to follow.      Impression    IMPRESSION:   1. Nothing acute.  2. Atrophy.  3. CT angiogram to follow.    RADHA WILCOX MD   CT Head w Contrast    Narrative    CT ANGIOGRAM OF THE HEAD AND NECK WITH CONTRAST  11/3/2017 3:32 PM     HISTORY: Code stroke.    TECHNIQUE:  Precontrast localizing scans were followed by CT  angiography with an injection of 70mL Isovue-370 (accession  [id number]), 50mL Isovue-370 (accession [id number]) IV with scans  through the head and neck.  Images were transferred to a separate 3-D  workstation where multiplanar reformations and 3-D images were  created.  Estimates of carotid stenoses are made relative to the  distal internal carotid artery diameters except as noted. Radiation  dose for this scan was reduced using automated exposure control,  adjustment of the mA and/or kV according to patient size, or iterative  reconstruction technique.  Perfusion scans were performed at three  levels with injection of an additional 40 mL IV nonionic contrast and  20 mL saline flush.  These images were processed on a separate 3-D  workstation.    COMPARISON: None.    CT HEAD FINDINGS:  No contrast  enhancing lesions. CT perfusion images  are limited due to motion artifact.    CT ANGIOGRAM HEAD FINDINGS:  The major intracranial arteries including  the proximal branches of the anterior cerebral, middle cerebral, and  posterior cerebral arteries appear patent without vascular cutoff. No  aneurysm identified. No significant stenosis. The P1 segment of the  left posterior cerebral artery is not visualized and is likely  hypoplastic or congenitally absent. The left posterior cerebral artery  is supplied by the patent left posterior communicating artery. Venous  circulation is unremarkable.     CT ANGIOGRAM NECK FINDINGS:   Normal origin of the great vessels from the aortic arch.    Right carotid artery: The right common and internal carotid arteries  are patent.  No significant stenosis.  Torturous right common and  internal carotid arteries with a retropharyngeal course.    Left carotid artery: The left common and internal carotid arteries are  patent.  No significant stenosis.  Tortuous left common and internal  carotid arteries with a retropharyngeal course.    Vertebral arteries:  Vertebral arteries are patent without evidence of  dissection.  No significant stenosis.      Other findings: Degenerative changes in the spine.      Impression    IMPRESSION:   1. Patent arteries in the head and neck without vascular cutoff. No  evidence of dissection. No aneurysm identified. No significant  stenosis.  2. CT perfusion images of the head are limited due to motion artifact.    Results discussed with Carli Shoemaker at 3:45 PM on 11/3/2017.    CHERRI MIN MD   CTA Angiogram Head Neck    Narrative    CT ANGIOGRAM OF THE HEAD AND NECK WITH CONTRAST  11/3/2017 3:32 PM     HISTORY: Code stroke.    TECHNIQUE:  Precontrast localizing scans were followed by CT  angiography with an injection of 70mL Isovue-370 (accession  [id number]), 50mL Isovue-[id number] (accession [id number]) IV with scans  through the head and neck.  Images were  transferred to a separate 3-D  workstation where multiplanar reformations and 3-D images were  created.  Estimates of carotid stenoses are made relative to the  distal internal carotid artery diameters except as noted. Radiation  dose for this scan was reduced using automated exposure control,  adjustment of the mA and/or kV according to patient size, or iterative  reconstruction technique.  Perfusion scans were performed at three  levels with injection of an additional 40 mL IV nonionic contrast and  20 mL saline flush.  These images were processed on a separate 3-D  workstation.    COMPARISON: None.    CT HEAD FINDINGS:  No contrast enhancing lesions. CT perfusion images  are limited due to motion artifact.    CT ANGIOGRAM HEAD FINDINGS:  The major intracranial arteries including  the proximal branches of the anterior cerebral, middle cerebral, and  posterior cerebral arteries appear patent without vascular cutoff. No  aneurysm identified. No significant stenosis. The P1 segment of the  left posterior cerebral artery is not visualized and is likely  hypoplastic or congenitally absent. The left posterior cerebral artery  is supplied by the patent left posterior communicating artery. Venous  circulation is unremarkable.     CT ANGIOGRAM NECK FINDINGS:   Normal origin of the great vessels from the aortic arch.    Right carotid artery: The right common and internal carotid arteries  are patent.  No significant stenosis.  Torturous right common and  internal carotid arteries with a retropharyngeal course.    Left carotid artery: The left common and internal carotid arteries are  patent.  No significant stenosis.  Tortuous left common and internal  carotid arteries with a retropharyngeal course.    Vertebral arteries:  Vertebral arteries are patent without evidence of  dissection.  No significant stenosis.      Other findings: Degenerative changes in the spine.      Impression    IMPRESSION:   1. Patent arteries in the  head and neck without vascular cutoff. No  evidence of dissection. No aneurysm identified. No significant  stenosis.  2. CT perfusion images of the head are limited due to motion artifact.    Results discussed with Carli Shoemaker at 3:45 PM on 11/3/2017.    CHERRI MIN MD   MR Brain w/o Contrast    Narrative    MR BRAIN W/O CONTRAST 11/4/2017 10:15 AM     HISTORY: Seizures/cva    TECHNIQUE: Multiplanar, multisequence MRI of the brain without IV  contrast material.    COMPARISON: None.    FINDINGS: Patient motion degrades the quality of this study.  There is  generalized atrophy of the brain.  White matter changes are present in  the cerebral hemispheres that are consistent with small vessel  ischemic disease in this age patient. There is a small questionable  area of restricted diffusion in the region of the right thalamus. This  is seen on axial image 11 of series 702. This could represent a small  lacunar type infarct.. . The facial structures appear normal. The  arteries at the base of the brain and the dural venous sinuses appear  patent.      Impression    IMPRESSION:   1. Significant patient motion artifact.  2. Question small lacunar type infarct in the region of the right  thalamus. Correlation with clinical symptoms suggested. No large  cortical infarct identified.  3. Age-related changes including generalized atrophy of the brain.  White matter changes in the cerebral hemispheres consistent with small  vessel ischemic disease in this age patient.[DW1.1]          Revision History        User Key Date/Time User Provider Type Action    > DW1.1 11/4/2017 11:19 AM Derek Kimball MD Physician Sign            Progress Notes by Tremaine Dorsey MD at 11/4/2017  1:11 AM     Author:  Tremaine Dorsey MD Service:  Hospitalist Author Type:  Physician    Filed:  11/4/2017  1:34 AM Date of Service:  11/4/2017  1:11 AM Creation Time:  11/4/2017  1:11 AM    Status:  Addendum :  Tremaine Dorsey MD  (Physician)         Paged by nurse regarding BG 61.[SF1.1] Hx DM2 diet controlled not on any oral antidiabetics and not on insulin[SF1.2]. Will initiate hypoglycemia protocol. Continue NS @ 100ml/hr[SF1.1]     Revision History        User Key Date/Time User Provider Type Action    > SF1.1 11/4/2017  1:34 AM Tremaine Dorsey MD Physician Addend     SF1.2 11/4/2017  1:11 AM Tremaine Dorsey MD Physician Sign            ED Notes signed by Mojgan Non-Provider at 11/3/2017  6:29 PM      Author:  Mojgan Non-Provider Service:  (none) Author Type:  (none)    Filed:  11/3/2017  6:29 PM Date of Service:  11/3/2017  6:27 PM Creation Time:  11/3/2017  6:29 PM    Status:  Signed :  Mojgan Non-Provider     Scan on 11/3/2017  6:29 PM by Mojgan Non-Provider : [facility] DEPARTMENT 1          Revision History        User Key Date/Time User Provider Type Action    > [N/A] 11/3/2017  6:29 PM Scan, Non-Provider (none) Sign            ED Notes by Dreyer, Bret R, RN at 11/3/2017  4:05 PM     Author:  Dreyer, Bret R, RN Service:  (none) Author Type:  Registered Nurse    Filed:  11/3/2017  4:45 PM Date of Service:  11/3/2017  4:05 PM Creation Time:  11/3/2017  4:45 PM    Status:  Signed :  Dreyer, Bret R, RN (Registered Nurse)         No TPA per MD, pt to move to room 4. Family at bedside with MD.[BD1.1]     Revision History        User Key Date/Time User Provider Type Action    > BD1.1 11/3/2017  4:45 PM Dreyer, Bret R, RN Registered Nurse Sign            ED Notes by Dreyer, Bret R, RN at 11/3/2017  4:45 PM     Author:  Dreyer, Bret R, RN Service:  (none) Author Type:  Registered Nurse    Filed:  11/3/2017  4:45 PM Date of Service:  11/3/2017  4:45 PM Creation Time:  11/3/2017  4:45 PM    Status:  Signed :  Dreyer, Jaciel R, RN (Registered Nurse)         Reported off[BD1.1]       Revision History        User Key Date/Time User Provider Type Action    > BD1.1 11/3/2017  4:45 PM Dreyer, Bret R, RN Registered Nurse  Sign            ED Notes by Dreyer, Bret R, RN at 11/3/2017  4:14 PM     Author:  Dreyer, Bret R, RN Service:  (none) Author Type:  Registered Nurse    Filed:  11/3/2017  4:18 PM Date of Service:  11/3/2017  4:14 PM Creation Time:  11/3/2017  4:16 PM    Status:  Addendum :  Dreyer, Bret R, RN (Registered Nurse)         [facility]  ED Nurse Handoff Report    ED Chief complaint: One-sided Weakness (staff at NH noticed pt leaning left with aphasia at approx 1000.)      ED Diagnosis:   Final diagnoses:   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 (H)       Code Status: Full Code    Allergies:   Allergies   Allergen Reactions     Penicillins        Activity level - Baseline/Home:  Total Care    Activity Level - Current:   Total Care     Needed?: No    Isolation: No  Infection: Not Applicable    Bariatric?: No    Vital Signs:   Vitals:    11/03/17 1523 11/03/17 1530 11/03/17 1545 11/03/17 1600   BP:  109/71 (!) 124/108 136/74   Pulse:  57  55   Resp: 17 12 12 12   Temp:       TempSrc:       SpO2: 97% 97% 96% 97%   Weight:           Cardiac Rhythm: ,   Cardiac  Cardiac Rhythm: Sinus bradycardia    Pain level: 0-10 Pain Scale: 0    Is this patient confused?: Yes    Patient Report: Initial Complaint: aphasia  Focused Assessment:[BD1.1] 67 year old female with a history of diabetes, hypertension, and dementia who presents via EMS for evaluation of facial asymmetry and one-sided weakness. The patient is currently living at the [facility] assisted living [facility] and is reportedly conversant at baseline. This morning around 1000, staff at the patient's facility noticed that she was leaning to the left, which is abnormal for her. Around 1200, they additionally noticed that she had developed left-sided facial drooping and was having difficulties speaking. At 1330, they called EMS to have her brought into the ED for evaluation with primary concern that she could be having a stroke. On  EMS' evaluation, the patient had a blood sugar of 140. Per family left side leaning has been for days, but facial droop and speech changes are new today.   [BD1.2]  Tests Performed: labs, EKG, head CTs  Abnormal Results: no acute abnormality on labs/CT  Treatments provided: IV fluid, ASA    Family Comments: at bedside    OBS brochure/video discussed/provided to patient: N/A    ED Medications:   Medications   0.9% sodium chloride BOLUS (1,000 mLs Intravenous New Bag 11/3/17 4988)   aspirin Suppository 300 mg (not administered)   iopamidol (ISOVUE-370) solution 120 mL (120 mLs Intravenous Given 11/3/17 1519)   saline flush 100mL (100 mLs Intravenous Given 11/3/17 1519)       Drips infusing?:  Yes      ED NURSE PHONE NUMBER: [id number][BD1.1]            Revision History        User Key Date/Time User Provider Type Action    > BD1.2 11/3/2017  4:18 PM Dreyer, Bret R, RN Registered Nurse Addend     BD1.1 11/3/2017  4:16 PM Dreyer, Bret R, RN Registered Nurse Sign            ED Notes by Dreyer, Bret R, RN at 11/3/2017  3:14 PM     Author:  Dreyer, Bret R, RN Service:  (none) Author Type:  Registered Nurse    Filed:  11/3/2017  3:14 PM Date of Service:  11/3/2017  3:14 PM Creation Time:  11/3/2017  3:14 PM    Status:  Signed :  Dreyer, Bret R, RN (Registered Nurse)         Pt in CT with flying squad[BD1.1]     Revision History        User Key Date/Time User Provider Type Action    > BD1.1 11/3/2017  3:14 PM Dreyer, Bret R, RN Registered Nurse Sign            ED Notes by Trevin Cook RN at 11/3/2017  2:08 PM     Author:  Trevin Cook RN Service:  (none) Author Type:  Registered Nurse    Filed:  11/3/2017  2:08 PM Date of Service:  11/3/2017  2:08 PM Creation Time:  11/3/2017  2:08 PM    Status:  Signed :  Trevin Cook RN (Registered Nurse)         Bed: ST03  Expected date:   Expected time:   Means of arrival:   [facility]     Revision History        User Key Date/Time User Provider Type  Action    > RH1.1 11/3/2017  2:08 PM Trevin Cook, RN Registered Nurse Sign                  Procedure Notes     No notes of this type exist for this encounter.      Progress Notes - Therapies (Notes from 11/03/17 through 11/06/17)     No notes of this type exist for this encounter.

## 2017-11-03 NOTE — LETTER
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 (H) [I63.40]  Admit Date/Time: 11/3/2017  2:08 PM  Discharge Date  11/7 /17  Payor Source: Payor: UCARE / Plan: [employer] / Product Type: HMO /     Readmission Assessment Measure (LEONARDO) Risk Score/category: elevated    Plan of Care Goals/Milestone Events:          Reason for Communication Hand-off Referral: FYI Discharge to [facility]  Discharge Plan:       Concern for non-adherence with plan of care:   no  Discharge Needs Assessment:  Needs       Most Recent Value    # of Referrals Placed by CTS Communication hand-offs to next level of Care Providers          Any outstanding tests or procedures:              Antunez Recommendations:  Zahira Sutherland is an 67 year old female who presented with possible right and left sided weakness ( different examiners and different time of examination yielded inconsistent results)  A stroke workup was done and the MRI was of poor quality due to motion. Due to her dementia, she was unable to hold still The Radiologist felt there was a possible right lacunar infarct but the Neurologist felt this was artifact. It was felt not appropriate to sedate her to repeat the MRI  She had a echocardiogram and bubble study as part of the stroke workup and the bubble study was negative for potential paradoxical emboli, but the left atrium suggested a mass. Cardiology was consulted and their recommendation for further workup would be a cardiac MRI. Given she has no signs of cerebral emboli, the difficultiies with the head MRI and her Frontal Temporal dementia, prognosis and what the therapy would be for a cardiac mass (surgery or  observation) it was felt a cardiac MRI was not indicated at this time  I have discussed the diagnosis with the family.  I have strongly suggested a  Palliative medicine consultation when she is back at [facility] with discussion of how to maximize her quality of life, consideration of changing her code status and consideration of any further hospitalization or antibiotic treatment.        Isabel Cunha    AVS/Discharge Summary is the source of truth; this is a helpful guide for improved communication of patient story

## 2017-11-03 NOTE — PHARMACY-ADMISSION MEDICATION HISTORY
Admission medication history interview status for the 11/3/2017  admission is complete. See EPIC admission navigator for prior to admission medications     Medication history source reliability:Good    Actions taken by pharmacist (provider contacted, etc):None     Additional medication history information not noted on PTA med list :    1. Medications added: none  2. Medications deleted: Donepezil, lisinopril, nystatin    Medication reconciliation/reorder completed by provider prior to medication history? No    Time spent in this activity: 10 minutes    Prior to Admission medications    Medication Sig Last Dose Taking? Auth Provider   bisacodyl (DULCOLAX) 10 MG Suppository Place 10 mg rectally as needed for constipation 11/2/2017 at 2233 Yes Reported, Patient   levETIRAcetam 1000 MG TABS Take 1,000 mg by mouth 2 times daily 11/3/2017 at 800 Yes Augusto Padilla MD   aspirin 81 MG chewable tablet Take 81 mg by mouth daily 11/3/2017 at 800 Yes Unknown, Entered By History   GABAPENTIN PO Take 100 mg by mouth 3 times daily 11/3/2017 at 1200 Yes Reported, Patient   OLANZAPINE PO Take 2.5 mg by mouth 2 times daily 11/3/2017 at 800 Yes Reported, Patient   DULOXETINE HCL PO Take 30 mg by mouth daily 11/2/2017 at 1500 Yes Reported, Patient   cholecalciferol 2000 UNITS CAPS Take 1 capsule by mouth daily 11/3/2017 at 800 Yes Reported, Patient   Multiple Vitamin (MULTIVITAMIN) per tablet Take 1 tablet by mouth daily. 11/3/2017 at 800 Yes Trevin Syed MD

## 2017-11-03 NOTE — IP AVS SNAPSHOT
"` `           [facility]: [phone]                                              INTERAGENCY TRANSFER FORM - NURSING   11/3/2017                    Hospital Admission Date: 11/3/2017  VILMA MARTINEZ   : 1950  Sex: Female        Attending Provider: Leonel Junior MD     Allergies:  Penicillins    Infection:  None   Service:  HOSPITALIST    Ht:  1.676 m (5' 6\")   Wt:  113.9 kg (251 lb)   Admission Wt:  115 kg (253 lb 8.5 oz)    BMI:  40.51 kg/m 2   BSA:  2.3 m 2            Patient PCP Information     Provider PCP Type    GABRIELE Brooks CNP General      Current Code Status     Date Active Code Status Order ID Comments User Context       Prior      Code Status History     Date Active Date Inactive Code Status Order ID Comments User Context    2017 10:36 AM  Full Code [id number]  Derek Kimball MD Outpatient    11/3/2017  6:11 PM 2017 10:36 AM Full Code [id number]  Leonel Junior MD Inpatient    10/3/2017  8:38 AM 11/3/2017  6:11 PM Full Code [id number]  Augusto Padilla MD Outpatient    2017  2:41 PM 10/3/2017  8:38 AM Full Code [id number]  Morris Gutierrez MD Inpatient    2017 12:20 PM 2017  2:41 PM Full Code [id number]  Brianna Crespo APRN CNP Outpatient      Advance Directives        Does patient have a scanned Advance Directive/ACP document in EPIC?           No (Pt has POLST)        Hospital Problems as of 2017              Priority Class Noted POA    CVA (cerebral vascular accident) (H) Medium  11/3/2017 Yes      Non-Hospital Problems as of 2017              Priority Class Noted    Hypertension goal BP (blood pressure) < 130/80 High  Unknown    Knee pain Medium  11/3/2008    Plantar fasciitis Medium  11/3/2008    Vitamin D deficiency Medium  2009    Advance Care Planning Medium  10/31/2011    Early onset Alzheimer's disease with behavioral disturbance> [facility] 2016 High  2017    Moderate episode of recurrent major depressive disorder (H) High  2017    Apraxia Medium  2017    Family history of malignant neoplasm of ovary> sister  Medium  2017    Onychomycosis> toe nails Medium  2017    History of diabetes mellitus, type II Medium  2017    Routine general medical examination at a health care facility> done 2017 Low  2017    Bariatric surgery status Medium  2017    Word finding difficulty High  2017    BMI 40.0-44.9, adult (H) Medium  2017    Seizure (H) Medium  2017    Bradycardia High  10/4/2017    Gibberish aphasia Medium  2017    Dysphagia, unspecified type Medium  2017      Immunizations     Name Date      Influenza (High Dose) 3 valent vaccine 10/03/17     Influenza (IIV3) 12     Pneumococcal (PCV 13) 17     Pneumococcal 23 valent 13     TDAP Vaccine (Boostrix) 11          END      ASSESSMENT     Discharge Profile Flowsheet     DISCHARGE NEEDS ASSESSMENT     Inspection of bony prominences  Full 17 1049    # of Referrals Placed by CTS  Post Acute Facilities;Transportation 10/03/17 1328   Inspection under devices  Full 17 1049    GASTROINTESTINAL (ADULT,PEDIATRIC,OB)     Skin WDL  ex 17 1049    GI WDL  ex 17 1049   Skin Color/Characteristics  bruised (ecchymotic);redness blanchable 17 1049    Abdominal Appearance  obese 17 1049   Skin Temperature  other (see comments) (Right arm cool/gregor cheeks) 17 0330    Last Bowel Movement  17 1049   Skin Moisture  moist 17 0330    Incontinence Containment Product  diaper 17 1049   Skin Integrity  abrasion(s);bruise(s);excoriation 17 1049    Passing flatus  yes 17 1049   SAFETY      COMMUNICATION ASSESSMENT     Safety WDL  WDL 17 1049    Patient's communication style  spoken language (English or Bilingual) 17 1426   All Alarms  alarm(s)  "activated and audible 11/06/17 1049    SKIN                        Assessment WDL (Within Defined Limits) Definitions           Safety WDL     Effective: 09/28/15    Row Information: <b>WDL Definition:</b> Bed in low position, wheels locked; call light in reach; upper side rails up x 2; ID band on<br> <font color=\"gray\"><i>Item=AS safety wdl>>List=AS safety wdl>>Version=F14</i></font>      Skin WDL     Effective: 09/28/15    Row Information: <b>WDL Definition:</b> Warm; dry; intact; elastic; without discoloration; pressure points without redness<br> <font color=\"gray\"><i>Item=AS skin wdl>>List=AS skin wdl>>Version=F14</i></font>      Vitals     Vital Signs Flowsheet     VITAL SIGNS     Side Effects Monitoring: Sedation Level  S 11/06/17 0315    Temp  98.4  F (36.9  C) 11/06/17 0813   HEIGHT AND WEIGHT      Temp src  Axillary 11/06/17 0813   Height Method  Actual 11/03/17 1428    Resp  16 11/06/17 0813   Weight  113.9 kg (251 lb) 11/04/17 0538    Pulse  81 11/05/17 2028   EKG MONITORING      Heart Rate  53 11/06/17 0813   Cardiac Regularity  Regular 11/03/17 1429    Pulse/Heart Rate Source  Monitor 11/06/17 0813   Cardiac Rhythm  SB 11/03/17 1429    BP  100/58 11/06/17 0813   DELONTE COMA SCALE      BP Location  Right arm 11/06/17 0813   Best Eye Response  4-->(E4) spontaneous 11/06/17 0522    OXYGEN THERAPY     Best Motor Response  6-->(M6) obeys commands 11/06/17 0522    SpO2  95 % 11/06/17 0813   Best Verbal Response  2-->(V2) incomprehensible speech 11/06/17 0522    O2 Device  None (Room air) 11/06/17 0813   Magnet Coma Scale Score  12 11/06/17 0522    PAIN/COMFORT     POSITIONING      Patient Currently in Pain  LORIN 11/06/17 0315   Body Position  log-rolled;side-lying, right 11/06/17 1049    Preferred Pain Scale  number (Numeric Rating Pain Scale) 11/05/17 0312   Head of Bed (HOB)  HOB at 30 degrees 11/06/17 1049    0-10 Pain Scale  0 11/03/17 1428   Positioning/Transfer Devices  pillows;applied;in use 11/06/17 " 1049    FACES Pain Rating  0-->no hurt 11/05/17 1541   DAILY CARE      Pain Intervention(s)  Repositioned 11/06/17 0315   Activity Management  activity adjusted per tolerance 11/06/17 1049    ANALGESIA SIDE EFFECTS MONITORING     Activity Assistance Provided  assistance, 2 people 11/06/17 1049    Side Effects Monitoring: Respiratory Quality  R 11/06/17 0315   ECG      Side Effects Monitoring: Respiratory Depth  N 11/06/17 0315   Equipment  electrodes changed;telemetry batteries changed 11/04/17 1223            Patient Lines/Drains/Airways Status    Active LINES/DRAINS/AIRWAYS     Name: Placement date: Placement time: Site: Days: Last dressing change:    Peripheral IV 11/04/17 Left Hand 11/04/17 0230   Hand   2     Pressure Injury 09/28/17 Right Hip R hip Redness 09/28/17   2000    38     Rash Bilateral lower lower quadrant                     Patient Lines/Drains/Airways Status    Active PICC/CVC     None            Intake/Output Detail Report     Date Intake     Output    Shift P.O. I.V. IV Piggyback Total Total       Noc 11/04/17 2300 - 11/05/17 0659 0 800 -- 800 -- 800    Day 11/05/17 0700 - 11/05/17 1459 150 -- -- 150 -- 150    Jen 11/05/17 1500 - 11/05/17 2259 200 -- -- 200 -- 200    Noc 11/05/17 2300 - 11/06/17 0659 0 -- -- -- -- 0    Day 11/06/17 0700 - 11/06/17 1459 -- -- -- -- -- 0      Last Void/BM       Most Recent Value    Urine Occurrence 1 at 11/06/2017 0650    Stool Occurrence 1 at 11/06/2017 0650      Case Management/Discharge Planning     Case Management/Discharge Planning Flowsheet     REFERRAL INFORMATION     QUESTION TO PATIENT: Do you feel safe going back to the place where you are living?  patient declined to answer or is unable to answer 11/03/17 1438    # of Referrals Placed by CTS  Post Acute Facilities;Transportation 10/03/17 1328   OBSERVATION: Is there reason to believe there has been maltreatment of a vulnerable adult (ie. Physical/Sexual/Emotional abuse, self neglect, lack of adequate  food, shelter, medical care, or financial exploitation)?  no 11/03/17 1438    ABUSE RISK SCREEN     HOMICIDE RISK      QUESTION TO PATIENT:  Has a member of your family or a partner(now or in the past) intimidated, hurt, manipulated, or controlled you in any way?  patient declined to answer or is unable to answer 11/03/17 1438   Feels Like Hurting Others  other (see comments) (pt is unable to answer) 11/03/17 1435

## 2017-11-03 NOTE — PROGRESS NOTES
"[facility]    Chief Complaint   Patient presents with     Nursing Home Acute     Fatigue       HPI:    Zahira Sutherland is a 67 year old  (1950), who is being seen today for an episodic care visit at [facility]. Today's concern is: new onset of weakness and leaning to L  Generalized muscle weakness  She is leaning to the L, this was noticed this am by nursing staff    Early onset Alzheimer's disease with behavioral disturbance> [facility]  She is not able to talk and respond to questions    Seizure (H)  Has been free of seizures      REVIEW OF SYSTEMS:  Unobtainable secondary to cognitive impairment or aphasia.    GENERAL APPEARANCE:  Alert, in no distress  EXAM:  Constitutional: alert> but not responding in her usual manner to voice and touch, no distress and morbidly obese   Cardiovascular: negative, PMI normal. No lifts, heaves, or thrills. RRR. No murmurs, clicks gallops or rub  Head: Normocephalic. No masses, lesions, tenderness or abnormalities  Neck> leaning to L, has exaggerated cords in R side of neck while leaning    NEURO: positive findings: new L sided weakness> with exaggerated leaning of trunk and head to L side  No grasping in her hands  No eye contact    Assessment:  Generalized muscle weakness  I am concerned that she may have a CVA in progression  I did see her earlier this week and she did present very differently \"normal\" at that time    Early onset Alzheimer's disease with behavioral disturbance> [facility]  She is non verbal, but occ makes an attempt to answer questions    Seizure (H)  Non observed  I am concerned that her Keppra level might be too high      Plan:  Send to [facility] ER ASAP  I did talk with her family and dtr and sister agreed to this plan    Time 20 min    GABRIELE Brooks CNP  "

## 2017-11-03 NOTE — IP AVS SNAPSHOT
[address] [facility]    [facility] [address]    [place] [address]    Phone:  [phone]                                       After Visit Summary   11/3/2017    Zahira Sutherland    MRN: [id number]           After Visit Summary Signature Page     I have received my discharge instructions, and my questions have been answered. I have discussed any challenges I see with this plan with the nurse or doctor.    ..........................................................................................................................................  Patient/Patient Representative Signature      ..........................................................................................................................................  Patient Representative Print Name and Relationship to Patient    ..................................................               ................................................  Date                                            Time    ..........................................................................................................................................  Reviewed by Signature/Title    ...................................................              ..............................................  Date                                                            Time

## 2017-11-03 NOTE — H&P
[facility]    History and Physical  Hospitalist       Date of Admission:  11/3/2017    Assessment & Plan   Zahira Sutherland is a 67 year old female who presents with right-sided facial droop and extremity weakness    Expressive aphasia: Concerning for CVA with associated right-sided facial droop and upper extremity weakness, though diffusion-weighted CT images limited by motion artifact. Patient with a history of expressive aphasia as well which precipitated evaluation for seizure earlier this year. Certainly patient's presenting symptoms could be secondary to a Ralph's paralysis following known seizure disorder (EEG diagnosis in October 2017), though unclear at this time given patient's inability to provide history and definitive imaging still pending. At this time, will be evaluating for stroke as well as seizure with appropriate precautions in place.  -Neurology consulted, aware of patient from the emergency department  -Daily aspirin p.o./NC  -N.p.o. until cleared by speech pathology  -Speech pathology, PT/OT pending  -Seizure precautions  -MRI brain pending  -Keppra level added on  -Transitioned to Keppra 1 g b.i.d. IV given n.p.o. status  -Normal saline at 100 per hour    Epilepsy: Potentially contributing to expressive aphasia as above. Diagnosed with seizure disorder at [facility] in October 2017 in the setting of urinary tract infection. 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 Patient does have a known left parietal calcified meningioma which appears stable on imaging, though this could also be contributing.   -Neurology consulted  -I have not initiated IV Solu-Medrol at this time; paraneoplastic/Purkinje cell nuclear IgG screen negative from prior hospitalization  -As above, Keppra now converted to 1 g IV b.i.d. given n.p.o. status    Early onset Alzheimer's dementia with associated behavioral disturbance:   -Olanzapine 2.5 mg b.i.d. when able  to tolerate all her medications  -Gabapentin 100 mg t.i.d. when able to tolerate oral medications    Morbid obesity: Increased risk of all cause mortality. Has undergone bariatric surgery historically. History of type 2 diabetes, currently diet controlled following bariatric surgery.  -Multivitamin when tolerating oral intake    DVT Prophylaxis: Pneumatic Compression Devices    Code Status: Full Code as per chart review and most recent scanned in [facility].    Disposition: Expected discharge in potentially 2-3 days pending evaluation of seizures versus CVA    [facility]    Primary Care Physician   Brianna Crespo    Chief Complaint   Expressive aphasia    History is obtained from Dr Shoemaker in the Emergency Department, chart review. Pt only able to provide very limited history given dementia and expressive aphasia (able to state in the affirmative that she has difficulty speaking, though unable to follow commands in a degree that is reassuring to me that patient does not have receptive aphasia or dementia limiting history even to this extent.) Family is no longer present in the emergency department at time of my evaluation.    History of Present Illness   Zahira Sutherland is a 67 year old female who presents with expressive aphasia, L-sided facial droop, weakness in the setting of early onset Alzheimer's dementia for which she resides at [facility]. Patient with onset of symptoms noted by staff at approximately 10 AM, left-sided facial droop noted approximately noon, arrived in the emergency department at approximately 2 PM. Initial CT imaging without evidence of thrombosis, no intracranial bleed, no acute intracranial pathology, diffusion-weighted CT images limited by motion artifact, though no definitive evidence of stroke. Dr. Hunter of neurology was made aware of patient's presentation in the emergency department. Throughout her emergency department stay, patient with no  significant improvement in her symptoms. On my evaluation, family is no longer present. Patient is able to answer in the affirmative when asked if she is having difficulty getting her words out. Otherwise with garbled speech and limited ability to follow commands consistently. Neurologic exam is also limited by inability to follow commands. This said, patient does have an overt left-sided facial droop as compared to right and thickened attempts at speech.    Patient's recent history is significant for a diagnosis of seizures by EEG at [facility] early October. Patient with episodes of expressive aphasia at that time as well. Neurologic findings currently observed could be secondary to uncontrolled seizure disorder if workup for CVA is negative. Regardless, neurology will follow. 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 No report of unilateral weakness or facial droop at that time.    Patient is unable to provide any additional meaningful history. Unclear if this is secondary to postictal state, expressive and receptive aphasia, or chronic dementing illness. Reportedly, however, patient is conversant at baseline, making Alzheimer's and unlikely cause of her current inability to provide history.    Past Medical History    I have reviewed this patient's medical history and updated it with pertinent information if needed.   Past Medical History:   Diagnosis Date     Alzheimer's disease with early onset      Anemia      Apraxia      Dementia      Depressive disorder      Diabetes      Hypertension      Hypertension      Major depressive disorder, recurrent (H)      Obesity      Personal history of other endocrine, nutritional and metabolic disease      Tinea unguium      Vitamin D deficiency        Past Surgical History   I have reviewed this patient's surgical  history and updated it with pertinent information if needed.  Past Surgical History:   Procedure Laterality Date     BACK SURGERY      hernia repair in her 38     CHOLECYSTECTOMY      in her 30s     GASTRIC BYPASS  1992     SURGICAL HISTORY OF -   1980s    back surgery- uncertain type       Prior to Admission Medications   Prior to Admission Medications   Prescriptions Last Dose Informant Patient Reported? Taking?   DULOXETINE HCL PO 11/2/2017 at 1500 Nursing Home Yes Yes   Sig: Take 30 mg by mouth daily   GABAPENTIN PO 11/3/2017 at 1200 Nursing Home Yes Yes   Sig: Take 100 mg by mouth 3 times daily   Multiple Vitamin (MULTIVITAMIN) per tablet 11/3/2017 at 800 Nursing [place] No Yes   Sig: Take 1 tablet by mouth daily.   OLANZAPINE PO 11/3/2017 at 800 residential Yes Yes   Sig: Take 2.5 mg by mouth 2 times daily   aspirin 81 MG chewable tablet 11/3/2017 at 800 residential Yes Yes   Sig: Take 81 mg by mouth daily   bisacodyl (DULCOLAX) 10 MG Suppository 11/2/2017 at 2233 Nursing [place] Yes Yes   Sig: Place 10 mg rectally as needed for constipation   cholecalciferol 2000 UNITS CAPS 11/3/2017 at 800 residential Yes Yes   Sig: Take 1 capsule by mouth daily   levETIRAcetam 1000 MG TABS 11/3/2017 at 800 Nursing [place] No Yes   Sig: Take 1,000 mg by mouth 2 times daily      Facility-Administered Medications: None     Allergies   Allergies   Allergen Reactions     Penicillins        Social History   I have reviewed this patient's social history and updated it with pertinent information if needed. Zahira Arringtonrosie  reports that she has never smoked. She has never used smokeless tobacco. She reports that she does not drink alcohol or use illicit drugs.    Family History   I have reviewed this patient's family history and updated it with pertinent information if needed.   Family History   Problem Relation Age of Onset     Obesity Mother      CEREBROVASCULAR DISEASE Mother      Breast Cancer Mother      HEART DISEASE Mother       Obesity Father      Cancer - colorectal Father      DIABETES Father      Other Cancer Father      Obesity Sister      Obesity Brother      Hypertension Sister        Review of Systems   The only history that patient is able to contribute is that she answers in the affirmative that she is having difficulty speaking. As above, is unable to follow commands with regularity raising suspicion that patient's history even to this extent is limited.    Physical Exam   Temp: 98  F (36.7  C) Temp src: Temporal BP: (!) 117/95 Pulse: 55 Heart Rate: 51 Resp: 10 SpO2: 100 % O2 Device: None (Room air)    Vital Signs with Ranges  Temp:  [98  F (36.7  C)] 98  F (36.7  C)  Pulse:  [54-90] 55  Heart Rate:  [51-64] 51  Resp:  [10-19] 10  BP: (109-138)/() 117/95  SpO2:  [91 %-100 %] 100 %  253 lbs 8.46 oz    Constitutional: no acute distress, alert. Sitting up in bed. Mildly obese.  Eyes: no scleral icterus or injection  HEENT: moist mucous membranes  Respiratory: breath sounds clear bilaterally to auscultation, no wheezes, no crackles. Poor inspiration  Cardiovascular: regular rate and rhythm, no murmur  GI: abdomen soft, non-tender, normoactive bowel sounds, no masses  Lymph/Hematologic: chronic bilateral lymphedema with trace pitting  Musculoskeletal: muscular tone intact in all extremities  Neurologic: patient alert. Unfortunately, patient cannot consistently follow commands. For instance, patient is unable to follow directions for assessment of extraocular musculature; strength exam is variable. 1/6 gripstrength bilaterally in upper extremities. Thickened speech. Unclear if receptive aphasia is present or this is secondary to dementia.  Psychiatric: unable to assess    Data   Data reviewed today:  I personally reviewed the head CT image(s) showing left temporal calcified (likely) meningioma present, noted on prior scans as well, no hemorrhage present..      Recent Labs  Lab 11/03/17  1415   WBC 7.7   HGB 15.2   MCV 98   PLT  161   INR 0.99      POTASSIUM 4.0   CHLORIDE 107   CO2 30   BUN 14   CR 0.90   ANIONGAP 7   EVY 9.5   *       Recent Results (from the past 24 hour(s))   CT Head w/o Contrast    Narrative    CT HEAD W/O CONTRAST  11/3/2017 2:39 PM    HISTORY: Code stroke. Listing to the left side in the nursing  facility. Right facial droop.    TECHNIQUE: Scans were obtained through the head without IV contrast.   Radiation dose for this scan was reduced using automated exposure  control, adjustment of the mA and/or kV according to patient size, or  iterative reconstruction technique.    COMPARISON: 10/04/2017.    FINDINGS: Moderate atrophy. Mild low-density changes in the white  matter both hemispheres consistent with chronic small vessel ischemic  disease.  No hemorrhage, mass lesion, or focal area of acute  infarction identified. Paranasal sinuses are normal. No bony  abnormality. No interval change. CT angiogram to follow.      Impression    IMPRESSION:   1. Nothing acute.  2. Atrophy.  3. CT angiogram to follow.    RADHA WILCOX MD   CT Head w Contrast    Narrative    CT ANGIOGRAM OF THE HEAD AND NECK WITH CONTRAST  11/3/2017 3:32 PM     HISTORY: Code stroke.    TECHNIQUE:  Precontrast localizing scans were followed by CT  angiography with an injection of 70mL Isovue-370 (accession  [id number]), 50mL Isovue-370 (accession [id number]) IV with scans  through the head and neck.  Images were transferred to a separate 3-D  workstation where multiplanar reformations and 3-D images were  created.  Estimates of carotid stenoses are made relative to the  distal internal carotid artery diameters except as noted. Radiation  dose for this scan was reduced using automated exposure control,  adjustment of the mA and/or kV according to patient size, or iterative  reconstruction technique.  Perfusion scans were performed at three  levels with injection of an additional 40 mL IV nonionic contrast and  20 mL saline flush.  These  images were processed on a separate 3-D  workstation.    COMPARISON: None.    CT HEAD FINDINGS:  No contrast enhancing lesions. CT perfusion images  are limited due to motion artifact.    CT ANGIOGRAM HEAD FINDINGS:  The major intracranial arteries including  the proximal branches of the anterior cerebral, middle cerebral, and  posterior cerebral arteries appear patent without vascular cutoff. No  aneurysm identified. No significant stenosis. The P1 segment of the  left posterior cerebral artery is not visualized and is likely  hypoplastic or congenitally absent. The left posterior cerebral artery  is supplied by the patent left posterior communicating artery. Venous  circulation is unremarkable.     CT ANGIOGRAM NECK FINDINGS:   Normal origin of the great vessels from the aortic arch.    Right carotid artery: The right common and internal carotid arteries  are patent.  No significant stenosis.  Torturous right common and  internal carotid arteries with a retropharyngeal course.    Left carotid artery: The left common and internal carotid arteries are  patent.  No significant stenosis.  Tortuous left common and internal  carotid arteries with a retropharyngeal course.    Vertebral arteries:  Vertebral arteries are patent without evidence of  dissection.  No significant stenosis.      Other findings: Degenerative changes in the spine.      Impression    IMPRESSION:   1. Patent arteries in the head and neck without vascular cutoff. No  evidence of dissection. No aneurysm identified. No significant  stenosis.  2. CT perfusion images of the head are limited due to motion artifact.    Results discussed with Carli Shoemaker at 3:45 PM on 11/3/2017.    CHERRI MIN MD   CTA Angiogram Head Neck    Narrative    CT ANGIOGRAM OF THE HEAD AND NECK WITH CONTRAST  11/3/2017 3:32 PM     HISTORY: Code stroke.    TECHNIQUE:  Precontrast localizing scans were followed by CT  angiography with an injection of 70mL Isovue-370  (accession  [id number]), 50mL Isovue-[id number] (accession [id number]) IV with scans  through the head and neck.  Images were transferred to a separate 3-D  workstation where multiplanar reformations and 3-D images were  created.  Estimates of carotid stenoses are made relative to the  distal internal carotid artery diameters except as noted. Radiation  dose for this scan was reduced using automated exposure control,  adjustment of the mA and/or kV according to patient size, or iterative  reconstruction technique.  Perfusion scans were performed at three  levels with injection of an additional 40 mL IV nonionic contrast and  20 mL saline flush.  These images were processed on a separate 3-D  workstation.    COMPARISON: None.    CT HEAD FINDINGS:  No contrast enhancing lesions. CT perfusion images  are limited due to motion artifact.    CT ANGIOGRAM HEAD FINDINGS:  The major intracranial arteries including  the proximal branches of the anterior cerebral, middle cerebral, and  posterior cerebral arteries appear patent without vascular cutoff. No  aneurysm identified. No significant stenosis. The P1 segment of the  left posterior cerebral artery is not visualized and is likely  hypoplastic or congenitally absent. The left posterior cerebral artery  is supplied by the patent left posterior communicating artery. Venous  circulation is unremarkable.     CT ANGIOGRAM NECK FINDINGS:   Normal origin of the great vessels from the aortic arch.    Right carotid artery: The right common and internal carotid arteries  are patent.  No significant stenosis.  Torturous right common and  internal carotid arteries with a retropharyngeal course.    Left carotid artery: The left common and internal carotid arteries are  patent.  No significant stenosis.  Tortuous left common and internal  carotid arteries with a retropharyngeal course.    Vertebral arteries:  Vertebral arteries are patent without evidence of  dissection.  No significant  stenosis.      Other findings: Degenerative changes in the spine.      Impression    IMPRESSION:   1. Patent arteries in the head and neck without vascular cutoff. No  evidence of dissection. No aneurysm identified. No significant  stenosis.  2. CT perfusion images of the head are limited due to motion artifact.    Results discussed with Carli Shoemaker at 3:45 PM on 11/3/2017.    CHERRI MIN MD

## 2017-11-03 NOTE — IP AVS SNAPSHOT
` [place] [address] [facility]: [phone]            Medication Administration Report for Zahira Sutherland as of 11/06/17 1156   Legend:    Given Hold Not Given Due Canceled Entry Other Actions    Time Time (Time) Time  Time-Action       Inactive    Active    Linked        Medications 10/31/17 11/01/17 11/02/17 11/03/17 11/04/17 11/05/17 11/06/17    acetaminophen (TYLENOL) Suppository 650 mg  Dose: 650 mg Freq: EVERY 4 HOURS PRN Route: RE  PRN Reason: mild pain  Start: 11/03/17 1811   Admin Instructions: Alternate ibuprofen (if ordered) with acetaminophen.  Maximum acetaminophen dose from all sources = 75 mg/kg/day not to exceed 4 grams/day.               acetaminophen (TYLENOL) tablet 650 mg  Dose: 650 mg Freq: EVERY 4 HOURS PRN Route: PO  PRN Reason: mild pain  Start: 11/03/17 1811   Admin Instructions: Alternate ibuprofen (if ordered) with acetaminophen.  Maximum acetaminophen dose from all sources = 75 mg/kg/day not to exceed 4 grams/day.         1506 (650 mg)-Given             aspirin chewable tablet 81 mg  Dose: 81 mg Freq: DAILY Route: PO  Start: 11/06/17 0900          1022 (81 mg)-Given           benztropine (COGENTIN) tablet 1-2 mg  Dose: 1-2 mg Freq: 3 TIMES DAILY PRN Route: PO  PRN Reason: other  PRN Comment: for extrapyramidal effects  Start: 11/04/17 1117              bisacodyl (DULCOLAX) Suppository 10 mg  Dose: 10 mg Freq: DAILY PRN Route: RE  PRN Reason: constipation  Start: 11/03/17 1811   Admin Instructions: Hold for loose stools.  This is the third step of a three step constipation treatment.               cholecalciferol (vitamin D3) tablet 2,000 Units  Dose: 2,000 Units Freq: DAILY Route: PO  Start: 11/04/17 0900        (1039)-Not Given       1508 (2,000 Units)-Given        0918 (2,000 Units)-Given        0846 (2,000 Units)-Given           DULoxetine (CYMBALTA) EC capsule 30 mg  Dose: 30 mg Freq: DAILY Route: PO  Start: 11/03/17 1815       (1942)-Not Given         (1039)-Not Given       1509 (30 mg)-Given        0918 (30 mg)-Given        0846 (30 mg)-Given           gabapentin (NEURONTIN) capsule 100 mg  Dose: 100 mg Freq: 3 TIMES DAILY Route: PO  Start: 11/03/17 2200       (2313)-Not Given        (1040)-Not Given       1507 (100 mg)-Given       2021 (100 mg)-Given               0918 (100 mg)-Given       1607 (100 mg)-Given       2016 (100 mg)-Given               0846 (100 mg)-Given       [ ] 1600       [ ] 2200           glucose 40 % gel 15-30 g  Dose: 15-30 g Freq: EVERY 15 MIN PRN Route: PO  PRN Reason: low blood sugar  Start: 11/04/17 0133   Admin Instructions: Give 15 g for BG 51 to 69 mg/dL IF patient is conscious and able to swallow. Give 30 g for BG less than or equal to 50 mg/dL IF patient is conscious and able to swallow. Do NOT give glucose gel via enteral tube.  IF patient has enteral tube: give apple juice 120 mL (4 oz or 15 g of CHO) via enteral tube for BG 51 to 69 mg/dL.  Give apple juice 240 mL (8 oz or 30 g of CHO) via enteral tube for BG less than or equal to 50 mg/dL.    ~Oral gel is preferable for conscious and able to swallow patient.   ~IF gel unavailable or patient refuses may provide apple juice 120 mL (4 oz or 15 g of CHO). Document juice on I and O flowsheet.                     Or  dextrose 50 % injection 25-50 mL  Dose: 25-50 mL Freq: EVERY 15 MIN PRN Route: IV  PRN Reason: low blood sugar  Start: 11/04/17 0133   Admin Instructions: Use if have IV access, BG less than 70 mg/dL and meet dose criteria below:  Dose if conscious and alert (or disorientated) and NPO = 25 mL  Dose if unconscious / not alert = 50 mL  Vesicant. For ordered doses up to 25 mg, give IV Push undiluted. Give each 5g over 1 minute.         0235 (25 mL)-Given            Or  glucagon injection 1 mg  Dose: 1 mg Freq: EVERY 15 MIN PRN Route: SC  PRN Reason: low blood sugar  PRN Comment: May repeat x 1 only  Start: 11/04/17 0133   Admin Instructions: May give SQ or IM. ONLY use  glucagon IF patient has NO IV access AND is UNABLE to swallow AND blood glucose is LESS than or EQUAL to 50 mg/dL.  Give IV Push over 1 minute. Reconstitute with 1mL sterile water.                      hydrALAZINE (APRESOLINE) injection 10-20 mg  Dose: 10-20 mg Freq: EVERY 1 HOUR PRN Route: IV  PRN Reasons: high blood pressure,other  PRN Comment: for Systolic Blood Pressure greater than 200 mmHg or Diastolic Blood Pressure greater than 110 mmHg  Start: 11/03/17 1811   Admin Instructions: USE if Heart Rate less than 60 bpm upon antihypertensive initiation or Heart Rate falls to less than 60 bpm during labetalol.  Give 10 mg, wait 30 minutes. If not effective then repeat 10 mg. Wait 1 hour. If not effective then give 20 mg.  May add prn enalaprilat (if ordered) if Systolic Blood Pressure parameter is not met after reaching hydrALAZINE 20 mg. Notify provider within 1 hour if Blood Pressure parameters are not met.  For ordered doses up to 40 mg, give IV Push undiluted over 1 minute.               HYDROmorphone (PF) (DILAUDID) injection 0.2 mg  Dose: 0.2 mg Freq: EVERY 2 HOURS PRN Route: IV  PRN Reason: severe pain  Start: 11/03/17 1811   Admin Instructions: Hold while on PCA.  Give IV Push undiluted up to 4 mg. Each 2mg over 2-5 minutes.               labetalol (NORMODYNE/TRANDATE) injection 10-40 mg  Dose: 10-40 mg Freq: EVERY 10 MIN PRN Route: IV  PRN Reasons: high blood pressure,other  PRN Comment: for Systolic Blood Pressure greater than 200 mmHg or Diastolic Blood Pressure greater than 110 mmHg  Start: 11/03/17 1811   Admin Instructions: Use if Heart Rate 60 bpm or greater upon antihypertensive initiation.  Hold if Heart Rate less than 60 bpm.  Increase or repeat the dose if Blood Pressure goal not met. Give 10 mg, wait 10 minutes.  If not effective then give 20 mg. Wait 10 min.  If not effective then give 40 mg.  May add prn hydrALAZINE (if ordered) if Systolic Blood Pressure parameter is not met after reaching  "labetalol 40mg.  If hydrALAZINE not ordered can use enalapril (if ordered) (MAX daily dose: 300 mg / 24 hrs)   Notify provider within 1 hour if Blood Pressure parameters are not met.  For ordered doses up to 80 mg, give IV Push undiluted. Give each 20 mg over 2 minutes.               levETIRAcetam (KEPPRA) solution 1,000 mg  Dose: 1,000 mg Freq: 2 TIMES DAILY Route: PO  Start: 11/05/17 2100   Admin Instructions: IV to PO per pharmacy policy          2016 (1,000 mg)-Given        0847 (1,000 mg)-Given       [ ] 2100           lidocaine (LMX4) cream  Freq: EVERY 1 HOUR PRN Route: Top  PRN Reason: mild pain  PRN Comment: with VAD insertion or accessing implanted port,  Start: 11/03/17 1811   Admin Instructions: Do NOT give if patient has a history of allergy to any local anesthetic or any \"rigoberto\" product.   Apply 30 min prior to VAD insertion or port access.  MAX Dose:  2.5 gm (  of 5 gm tube)               lidocaine (LMX4) cream  Freq: EVERY 1 HOUR PRN Route: Top  PRN Reason: pain  PRN Comment: with VAD insertion or accessing implanted port.  Start: 11/03/17 1811   Admin Instructions: Do NOT give if patient has a history of allergy to any local anesthetic or any \"rigoberto\" product.   Apply 30 minutes prior to VAD insertion or port access.  MAX Dose:  2.5 g (  of 5 g tube)               lidocaine 1 % 1 mL  Dose: 1 mL Freq: EVERY 1 HOUR PRN Route: OTHER  PRN Comment: mild pain with VAD insertion or accessing implanted port,  Start: 11/03/17 1811   Admin Instructions: Do NOT give if patient has a history of allergy to any local anesthetic or any \"rigoberto\" product. MAX dose 1 mL subcutaneous OR intradermal in divided doses.               lidocaine 1 % 1 mL  Dose: 1 mL Freq: EVERY 1 HOUR PRN Route: OTHER  PRN Comment: mild pain with VAD insertion or accessing implanted port  Start: 11/03/17 1811   Admin Instructions: Do NOT give if patient has a history of allergy to any local anesthetic or any \"rigoberto\" product. MAX dose 1 mL " subcutaneous OR intradermal in divided doses.               LORazepam (ATIVAN) injection 2 mg  Dose: 2 mg Freq: EVERY 3 MIN PRN Route: IV  PRN Reason: other  PRN Comment: seizure lasting more than one minute. May repeat once if seizure activity is still present 3 minutes after the initial dose.  Start: 11/03/17 1811   Admin Instructions: For IV PUSH: Dilute with equal volume of NS. For ordered doses up to 4 mg give IV Push. Administer each 2mg over 1-5 minutes.               magnesium sulfate 4 g in 100 mL sterile water (premade)  Dose: 4 g Freq: EVERY 4 HOURS PRN Route: IV  PRN Reason: magnesium supplementation  Start: 11/03/17 1811   Admin Instructions: For serum Mg++ less than 1.6 mg/dL  Give 4 g and recheck magnesium level 2 hours after dose, and next AM.               Medication Instruction  Freq: CONTINUOUS PRN Route: XX  Start: 11/03/17 1811   Admin Instructions: No D5W IV solutions unless patient hypoglycemic.  Pharmacy to remove all dextrose from iv fluid orders as able.               miconazole (MICATIN; MICRO GUARD) 2 % powder  Freq: EVERY 1 HOUR PRN Route: Top  PRN Reason: other  PRN Comment: topical candidiasis  Start: 11/04/17 0358   Admin Instructions: Apply to affected area.            0953 ( )-Given            naloxone (NARCAN) injection 0.1-0.4 mg  Dose: 0.1-0.4 mg Freq: EVERY 2 MIN PRN Route: IV  PRN Reason: opioid reversal  Start: 11/03/17 1811   Admin Instructions: For respiratory rate LESS than or EQUAL to 8.  Partial reversal dose:  0.1 mg titrated q 2 minutes for Analgesia Side Effects Monitoring Sedation Level of 3 (frequently drowsy, arousable, drifts to sleep during conversation).Full reversal dose:  0.4 mg bolus for Analgesia Side Effects Monitoring Sedation Level of 4 (somnolent, minimal or no response to stimulation).  Give IV Push undiluted up to 2mg. Give each 0.4mg over 15 seconds in emergency situations. For non-emergent situations further dilute in 9mL of NS to facilitate titration  of response.               OLANZapine (zyPREXA) injection 2.5 mg  Dose: 2.5 mg Freq: EVERY 6 HOURS PRN Route: IM  PRN Reason: agitation  PRN Comment: and NPO or unmanageable with oral choices.  Start: 11/04/17 1119   Admin Instructions: Dissolve the contents of the 10 mg vial using 2.1 mL of Sterile Water for Injection to provide a solution containing 5 mg/mL of olanzapine. Withdraw the ordered dose from vial. Use immediately (within 1 hour) after reconstitution. Discard any unused portion.               OLANZapine zydis (zyPREXA) ODT half-tab 2.5 mg  Dose: 2.5 mg Freq: 2 TIMES DAILY Route: PO  Start: 11/03/17 2100   Admin Instructions: With dry hands, peel back foil backing and gently remove tablet; do not push oral disintegrating tablet through foil backing; administer immediately on tongue and oral disintegrating tablet dissolves in seconds; then swallow with saliva; liquid not required.        (2313)-Not Given        (1040)-Not Given       1508 (2.5 mg)-Given       2022 (2.5 mg)-Given        0918 (2.5 mg)-Given       2016 (2.5 mg)-Given        0846 (2.5 mg)-Given       [ ] 2100           ondansetron (ZOFRAN-ODT) ODT tab 4 mg  Dose: 4 mg Freq: EVERY 6 HOURS PRN Route: PO  PRN Reasons: nausea,vomiting  Start: 11/03/17 1811   Admin Instructions: This is Step 1 of nausea and vomiting management.  If nausea not resolved in 15 minutes, go to Step 2 prochlorperazine (COMPAZINE). Do not push through foil backing. Peel back foil and gently remove. Place on tongue immediately. Administration with liquid unnecessary              Or  ondansetron (ZOFRAN) injection 4 mg  Dose: 4 mg Freq: EVERY 6 HOURS PRN Route: IV  PRN Reasons: nausea,vomiting  Start: 11/03/17 1811   Admin Instructions: This is Step 1 of nausea and vomiting management.  If nausea not resolved in 15 minutes, go to Step 2 prochlorperazine (COMPAZINE).  Irritant. For ordered doses up to 4 mg, give IV Push undiluted over 2-5 minutes.               polyethylene  glycol (MIRALAX/GLYCOLAX) Packet 17 g  Dose: 17 g Freq: DAILY PRN Route: PO  PRN Reason: constipation  Start: 11/03/17 1811   Admin Instructions: Give in 8oz of  water, juice, or soda. Hold for loose stools.  This is the second step of a three step constipation treatment.  1 Packet = 17 grams. Mixed prescribed dose in 8 ounces of water. Follow with 8 oz. of water.               potassium chloride (KLOR-CON) Packet 20-40 mEq  Dose: 20-40 mEq Freq: EVERY 2 HOURS PRN Route: ORAL OR FEED  PRN Reason: potassium supplementation  Start: 11/03/17 1811   Admin Instructions: Use if unable to tolerate tablets.  If Serum K+ 3.0-3.3, dose = 60 mEq po total dose (40 mEq x1 followed in 2 hours by 20 mEq x1). Recheck K+ level 4 hours after dose and the next AM.  If Serum K+ 2.5-2.9, dose = 80 mEq po total dose (40 mEq Q2H x2). Recheck K+ level 4 hours after dose and the next AM.  If Serum K+ less than 2.5, See IV order.  Dissolve packet contents in 4-8 ounces of cold water or juice.               potassium chloride 10 mEq in 100 mL intermittent infusion with 10 mg lidocaine  Dose: 10 mEq Freq: EVERY 1 HOUR PRN Route: IV  PRN Reason: potassium supplementation  Start: 11/03/17 1811   Admin Instructions: Infuse via PERIPHERAL LINE. Use potassium with lidocaine for pain with peripheral administration.  If Serum K+ 3.0-3.3, dose = 10 mEq/hr x4 doses (40 mEq IV total dose). Recheck K+ level 2 hours after dose and the next AM.  If Serum K+ less than 3.0, dose = 10 mEq/hr x6 doses (60 mEq IV total dose). Recheck K+ level 2 hours after dose and the next AM.               potassium chloride 10 mEq in 100 mL sterile water intermittent infusion (premix)  Dose: 10 mEq Freq: EVERY 1 HOUR PRN Route: IV  PRN Reason: potassium supplementation  Start: 11/03/17 1811   Admin Instructions: Infuse via PERIPHERAL LINE or CENTRAL LINE. Use for central line replacement if patient weight less than 65 kg, if patient is on TPN with high potassium content or  if unit does not stock 20 mEq bags.   If Serum K+ 3.0-3.3, dose = 10 mEq/hr x4 doses (40 mEq IV total dose). Recheck K+ level 2 hours after dose and the next AM.   If Serum K+ less than 3.0, dose = 10 mEq/hr x6 doses (60 mEq IV total dose). Recheck K+ level 2 hours after dose and the next AM.               potassium chloride SA (K-DUR/KLOR-CON M) CR tablet 20-40 mEq  Dose: 20-40 mEq Freq: EVERY 2 HOURS PRN Route: PO  PRN Reason: potassium supplementation  Start: 11/03/17 1811   Admin Instructions: Use if able to take PO.   If Serum K+ 3.0-3.3, dose = 60 mEq po total dose (40 mEq x1 followed in 2 hours by 20 mEq x1). Recheck K+ level 4 hours after dose and the next AM.  If Serum K+ 2.5-2.9, dose = 80 mEq po total dose (40 mEq Q2H x2). Recheck K+ level 4 hours after dose and the next AM.  If Serum K+ less than 2.5, See IV order.  DO NOT CRUSH               prochlorperazine (COMPAZINE) injection 5 mg  Dose: 5 mg Freq: EVERY 6 HOURS PRN Route: IV  PRN Reasons: nausea,vomiting  Start: 11/03/17 1811   Admin Instructions: This is Step 2 of nausea and vomiting management. Give if nausea not resolved 15 minutes after giving ondansetron (ZOFRAN). If nausea not resolved in 15 minutes, go to Step 3 metoclopramide (REGLAN), if ordered.  For ordered doses up to 10 mg, give IV Push undiluted. Each 5mg over 1 minute.              Or  prochlorperazine (COMPAZINE) tablet 5 mg  Dose: 5 mg Freq: EVERY 6 HOURS PRN Route: PO  PRN Reason: vomiting  Start: 11/03/17 1811   Admin Instructions: This is Step 2 of nausea and vomiting management. Give if nausea not resolved 15 minutes after giving ondansetron (ZOFRAN). If nausea not resolved in 15 minutes, go to Step 3 metoclopramide (REGLAN), if ordered.              Or  prochlorperazine (COMPAZINE) Suppository 12.5 mg  Dose: 12.5 mg Freq: EVERY 12 HOURS PRN Route: RE  PRN Reasons: nausea,vomiting  Start: 11/03/17 1811   Admin Instructions: This is Step 2 of nausea and vomiting management. Give  if nausea not resolved 15 minutes after giving ondansetron (ZOFRAN). If nausea not resolved in 15 minutes, go to Step 3 metoclopramide (REGLAN), if ordered.               senna-docusate (SENOKOT-S;PERICOLACE) 8.6-50 MG per tablet 1 tablet  Dose: 1 tablet Freq: 2 TIMES DAILY PRN Route: PO  PRN Reason: constipation  Start: 11/03/17 1811   Admin Instructions: If no bowel movement in 24 hours, increase to 2 tablets PO.  Hold for loose stools.  This is the first step of a three step constipation treatment.              Or  senna-docusate (SENOKOT-S;PERICOLACE) 8.6-50 MG per tablet 2 tablet  Dose: 2 tablet Freq: 2 TIMES DAILY PRN Route: PO  PRN Reason: constipation  Start: 11/03/17 1811   Admin Instructions: Hold for loose stools.  This is the first step of a three step constipation treatment.               sodium chloride (PF) 0.9% PF flush 3 mL  Dose: 3 mL Freq: EVERY 8 HOURS Route: IK  Start: 11/03/17 1815   Admin Instructions: And Q1H PRN, to lock peripheral IV dormant line.        1830 (3 mL)-Given        0339 (3 mL)-Given       (1040)-Not Given       (2021)-Not Given        (0304)-Not Given       (0920)-Not Given       2016 (3 mL)-Given        0215 (3 mL)-Given       1027 (3 mL)-Given       [ ] 1815           sodium chloride (PF) 0.9% PF flush 3 mL  Dose: 3 mL Freq: EVERY 1 HOUR PRN Route: IK  PRN Reasons: line flush,post meds or blood draw  Start: 11/03/17 1811   Admin Instructions: for peripheral IV flush post IV meds               sodium chloride (PF) 0.9% PF flush 3 mL  Dose: 3 mL Freq: EVERY 1 HOUR PRN Route: IK  PRN Reason: line flush  PRN Comment: for peripheral IV flush post IV meds  Start: 11/03/17 1811             Completed Medications  Medications 10/31/17 11/01/17 11/02/17 11/03/17 11/04/17 11/05/17 11/06/17         Dose: 1,000 mL Freq: ONCE Route: IV  Last Dose: 1,000 mL (11/03/17 1548)  Start: 11/03/17 1538   End: 11/03/17 1648       1548 (1,000 mL)-New Bag                Dose: 300 mg Freq: ONCE Route:  RE  Start: 11/03/17 1605   End: 11/03/17 1633       1633 (300 mg)-Given                Dose: 120 mL Freq: ONCE Route: IV  Start: 11/03/17 1412   End: 11/03/17 1519       1519 (120 mL)-Given                Dose: 100 mL Freq: ONCE Route: IV  Start: 11/03/17 1412   End: 11/03/17 1519   Admin Instructions: This entry is for use by Radiology to intermittently used as a flush in patients receiving a CT scan.        1519 (100 mL)-Given                Dose: 20 mL Freq: ONCE Route: IV  Start: 11/05/17 1045   End: 11/05/17 1036         1036 (20 mL)-Given [C]           Discontinued Medications  Medications 10/31/17 11/01/17 11/02/17 11/03/17 11/04/17 11/05/17 11/06/17         Rate: 100 mL/hr Freq: CONTINUOUS Route: IV  Last Dose: Stopped (11/05/17 1259)  Start: 11/04/17 0145   End: 11/05/17 1300        0339 ( )-Restarted       0538 ( )-New Bag       0811 ( )-Rate/Dose Verify       0930-Stopped [C]       1042 ( )-Restarted       1702 ( )-New Bag        0431 ( )-Rate/Dose Verify       0815 ( )-Rate/Dose Verify       1259-Stopped [C]       1300-Med Discontinued          Rate: 100 mL/hr Freq: CONTINUOUS Route: IV  Start: 11/03/17 1815   End: 11/04/17 0111       1830 ( )-New Bag        0111-Med Discontinued           Dose: 650 mg Freq: EVERY 4 HOURS PRN Route: RE  PRN Reason: mild pain  Start: 11/04/17 1055   End: 11/04/17 1105   Admin Instructions: Maximum acetaminophen dose from all sources = 75 mg/kg/day not to exceed 4 grams/day.         1105-Med Discontinued           Dose: 325 mg Freq: DAILY Route: PO  Start: 11/03/17 1815   End: 11/06/17 0848   Admin Instructions: On admission and daily.                        0918 (325 mg)-Given        0848-Med Discontinued      Or    Dose: 300 mg Freq: DAILY Route: RE  Start: 11/03/17 1815   End: 11/06/17 0848   Admin Instructions: On admission and daily. If unable to take PO.        1946 (300 mg)-Given        1035 (300 mg)-Given [C]                0848-Med Discontinued         Rate: 100  mL/hr Freq: CONTINUOUS Route: IV  Last Dose: Stopped (11/04/17 0130)  Start: 11/04/17 0115   End: 11/04/17 0132        0130-Stopped [C]       0132-Med Discontinued           Dose: 1,000 mg Freq: EVERY 12 HOURS Route: IV  Last Dose: 1,000 mg (11/05/17 0811)  Start: 11/03/17 2000   End: 11/05/17 1124 2159 (1,000 mg)-New Bag        0829 (1,000 mg)-New Bag       2025 (1,000 mg)-New Bag        0811 (1,000 mg)-New Bag       1124-Med Discontinued          Dose: 20 mEq Freq: EVERY 1 HOUR PRN Route: IV  PRN Reason: potassium supplementation  Start: 11/03/17 1811   End: 11/03/17 1836   Admin Instructions: Infuse via CENTRAL LINE Only. May need EKG if less than 65 kg or on TPN - Max rate is 0.3 mEq/kg/hr for patients not on EKG monitoring.   If Serum K+ 3.0-3.3, dose = 20 mEq/hr x2 doses (40 mEq IV total dose). Recheck K+ level 2 hours after dose and the next AM.  If Serum K+ less than 3.0, dose = 20 mEq/hr x3 doses (60 mEq IV total dose). Recheck K+ level 2 hours after dose and the next AM.        1836-Med Discontinued            Dose: 3 mL Freq: EVERY 8 HOURS Route: IK  Start: 11/03/17 1815   End: 11/05/17 0304   Admin Instructions: And Q1H PRN, to lock peripheral IV dormant line.        1830 (3 mL)-Given        0340 (3 mL)-Given       (1040)-Not Given                      0304-Med Discontinued     Medications 10/31/17 11/01/17 11/02/17 11/03/17 11/04/17 11/05/17 11/06/17

## 2017-11-03 NOTE — IP AVS SNAPSHOT
"[facility]: [phone]                                              INTERAGENCY TRANSFER FORM - LAB / IMAGING / EKG / EMG RESULTS   11/3/2017                    Hospital Admission Date: 11/3/2017  VILMA MARTINEZ   : 1950  Sex: Female        Attending Provider: Leonel Junior MD     Allergies:  Penicillins    Infection:  None   Service:  HOSPITALIST    Ht:  1.676 m (5' 6\")   Wt:  113.9 kg (251 lb)   Admission Wt:  115 kg (253 lb 8.5 oz)    BMI:  40.51 kg/m 2   BSA:  2.3 m 2            Patient PCP Information     Provider PCP Type    GABRIELE Brooks CNP General         Lab Results - 3 Days      Glucose by meter [[id number]]  Resulted: 17 0816, Result status: Final result    Ordering provider: Leonel Junior MD  17 0809 Resulting lab: POINT OF CARE TEST, GLUCOSE    Specimen Information    Type Source Collected On     17 0809          Components       Value Reference Range Flag Lab   Glucose 77 70 - 99 mg/dL  170            Glucose by meter [[id number]]  Resulted: 17 0411, Result status: Final result    Ordering provider: Leonel Junior MD  17 0407 Resulting lab: POINT OF CARE TEST, GLUCOSE    Specimen Information    Type Source Collected On     17 0407          Components       Value Reference Range Flag Lab   Glucose 78 70 - 99 mg/dL  170            Glucose by meter [[id number]]  Resulted: 17 0245, Result status: Final result    Ordering provider: Leonel Junior MD  17 0243 Resulting lab: POINT OF CARE TEST, GLUCOSE    Specimen Information    Type Source Collected On     17 0243          Components       Value Reference Range Flag Lab   Glucose 82 70 - 99 mg/dL  170            Glucose by meter [[id number]] (Abnormal)  Resulted: 17, Result status: Final result    Ordering provider: Leonel Junior MD  17 Resulting lab: POINT OF CARE TEST, GLUCOSE    Specimen " Information    Type Source Collected On     11/05/17 2217          Components       Value Reference Range Flag Lab   Glucose 116 70 - 99 mg/dL H 170            Glucose by meter [[id number]]  Resulted: 11/05/17 1706, Result status: Final result    Ordering provider: Leonel Junior MD  11/05/17 1658 Resulting lab: POINT OF CARE TEST, GLUCOSE    Specimen Information    Type Source Collected On     11/05/17 1658          Components       Value Reference Range Flag Lab   Glucose 92 70 - 99 mg/dL  170            Glucose by meter [[id number]] (Abnormal)  Resulted: 11/05/17 1231, Result status: Final result    Ordering provider: Leonel Junior MD  11/05/17 1223 Resulting lab: POINT OF CARE TEST, GLUCOSE    Specimen Information    Type Source Collected On     11/05/17 1223          Components       Value Reference Range Flag Lab   Glucose 102 70 - 99 mg/dL H 170            Glucose by meter [[id number]]  Resulted: 11/05/17 0821, Result status: Final result    Ordering provider: Leonel Junior MD  11/05/17 0803 Resulting lab: POINT OF CARE TEST, GLUCOSE    Specimen Information    Type Source Collected On     11/05/17 0803          Components       Value Reference Range Flag Lab   Glucose 79 70 - 99 mg/dL  170            Keppra (Levetiracetam) Level [[id number]]  Resulted: 11/05/17 0641, Result status: Final result    Ordering provider: Carli Shoemaker MD  11/03/17 1649 Resulting lab: [facility]    Specimen Information    Type Source Collected On   Blood  11/03/17 1924          Components       Value Reference Range Flag Lab   Keppra (Levetiracetam) Level 25 12 - 46 ug/mL  [facility]   Comment:         (Note)  INTERPRETIVE INFORMATION: Keppra (Levetiracetam)  Therapeutic Range:  12-46 ug/mL             Toxic:  Not well Established  Pharmacokinetics of levetiracetam are affected by renal   function. Adverse effects may include somnolence, weakness,   headache and vomiting.  Performed by [facility],  [address] [place] [id number] [phone]  [email or web address], Kyle Perez MD, Lab. Director              Glucose by meter [[id number]]  Resulted: 11/05/17 0625, Result status: Final result    Ordering provider: Leonel Junior MD  11/05/17 0618 Resulting lab: POINT OF CARE TEST, GLUCOSE    Specimen Information    Type Source Collected On     11/05/17 0618          Components       Value Reference Range Flag Lab   Glucose 82 70 - 99 mg/dL  170            Glucose by meter [[id number]]  Resulted: 11/05/17 0204, Result status: Final result    Ordering provider: Leonel Junior MD  11/05/17 0127 Resulting lab: POINT OF CARE TEST, GLUCOSE    Specimen Information    Type Source Collected On     11/05/17 0127          Components       Value Reference Range Flag Lab   Glucose 81 70 - 99 mg/dL  170            Glucose by meter [[id number]]  Resulted: 11/05/17 0204, Result status: Final result    Ordering provider: Leonel Junior MD  11/05/17 0052 Resulting lab: POINT OF CARE TEST, GLUCOSE    Specimen Information    Type Source Collected On     11/05/17 0052          Components       Value Reference Range Flag Lab   Glucose 80 70 - 99 mg/dL  170            Glucose by meter [[id number]]  Resulted: 11/04/17 2216, Result status: Final result    Ordering provider: Leonel Junior MD  11/04/17 2211 Resulting lab: POINT OF CARE TEST, GLUCOSE    Specimen Information    Type Source Collected On     11/04/17 2211          Components       Value Reference Range Flag Lab   Glucose 98 70 - 99 mg/dL  170            Glucose by meter [[id number]]  Resulted: 11/04/17 1905, Result status: Final result    Ordering provider: Leonel Junior MD  11/04/17 1859 Resulting lab: POINT OF CARE TEST, GLUCOSE    Specimen Information    Type Source Collected On     11/04/17 1859          Components       Value Reference Range Flag Lab   Glucose 78 70 - 99 mg/dL  170            Glucose by meter [[id number]]  Resulted: 11/04/17  1245, Result status: Final result    Ordering provider: Leonel Junior MD  11/04/17 1241 Resulting lab: POINT OF CARE TEST, GLUCOSE    Specimen Information    Type Source Collected On     11/04/17 1241          Components       Value Reference Range Flag Lab   Glucose 81 70 - 99 mg/dL  170            Glucose by meter [[id number]]  Resulted: 11/04/17 0930, Result status: Final result    Ordering provider: Leonel Junior MD  11/04/17 0912 Resulting lab: POINT OF CARE TEST, GLUCOSE    Specimen Information    Type Source Collected On     11/04/17 0912          Components       Value Reference Range Flag Lab   Glucose 75 70 - 99 mg/dL  170            Glucose by meter [[id number]]  Resulted: 11/04/17 0705, Result status: Final result    Ordering provider: Leonel Junior MD  11/04/17 0602 Resulting lab: POINT OF CARE TEST, GLUCOSE    Specimen Information    Type Source Collected On     11/04/17 0602          Components       Value Reference Range Flag Lab   Glucose 71 70 - 99 mg/dL  170            Glucose by meter [[id number]] (Abnormal)  Resulted: 11/04/17 0300, Result status: Final result    Ordering provider: Leonel Junior MD  11/04/17 0256 Resulting lab: POINT OF CARE TEST, GLUCOSE    Specimen Information    Type Source Collected On     11/04/17 0256          Components       Value Reference Range Flag Lab   Glucose 114 70 - 99 mg/dL H 170            Troponin I [[id number]]  Resulted: 11/04/17 0242, Result status: Final result    Ordering provider: Leonel Junior MD  11/03/17 2015 Resulting lab: [facility]    Specimen Information    Type Source Collected On   Blood  11/04/17 0210          Components       Value Reference Range Flag Lab   Troponin I ES <0.015 0.000 - 0.045 ug/L  [facility]   Comment:         The 99th percentile for upper reference range is 0.045 ug/L.  Troponin values   in the range of 0.045 - 0.120 ug/L may be associated with risks of adverse   clinical events.               Glucose by meter [[id number]] (Abnormal)  Resulted: 11/04/17 0051, Result status: Final result    Ordering provider: Leonel uJnior MD  11/04/17 0048 Resulting lab: POINT OF CARE TEST, GLUCOSE    Specimen Information    Type Source Collected On     11/04/17 0048          Components       Value Reference Range Flag Lab   Glucose 61 70 - 99 mg/dL L 170            TSH with free T4 reflex [[id number]]  Resulted: 11/03/17 2001, Result status: Final result    Ordering provider: Leonel Junior MD  11/03/17 1811 Resulting lab: [facility]    Specimen Information    Type Source Collected On   Blood  11/03/17 1924          Components       Value Reference Range Flag Lab   TSH 3.20 0.40 - 4.00 mU/L  FrStHsLb            Hemoglobin A1c [[id number]]  Resulted: 11/03/17 2000, Result status: Final result    Ordering provider: Leonel Junior MD  11/03/17 1811 Resulting lab: [facility]    Specimen Information    Type Source Collected On   Blood  11/03/17 1924          Components       Value Reference Range Flag Lab   Hemoglobin A1C 5.6 4.3 - 6.0 %  FrStHsLb            CRP inflammation [[id number]]  Resulted: 11/03/17 1957, Result status: Final result    Ordering provider: Leonel Junior MD  11/03/17 1811 Resulting lab: [facility]    Specimen Information    Type Source Collected On   Blood  11/03/17 1924          Components       Value Reference Range Flag Lab   CRP Inflammation 3.4 0.0 - 8.0 mg/L  FrStHsLb            Lipid panel reflex to direct LDL [[id number]] (Abnormal)  Resulted: 11/03/17 1957, Result status: Final result    Ordering provider: Leonel Junior MD  11/03/17 1811 Resulting lab: [facility]    Specimen Information    Type Source Collected On   Blood  11/03/17 1924          Components       Value Reference Range Flag Lab   Cholesterol 206 <200 mg/dL H FrStHsLb   Comment:  Desirable:       <200 mg/dl   Triglycerides 83 <150 mg/dL   FrStHsLb   HDL Cholesterol 49 >49 mg/dL L FrStHsLb   LDL Cholesterol Calculated 140 <100 mg/dL H FrStHsLb   Comment:         Above desirable:  100-129 mg/dl  Borderline High:  130-159 mg/dL  High:             160-189 mg/dL  Very high:       >189 mg/dl     Non HDL Cholesterol 157 <130 mg/dL H FrStHsLb   Comment:         Above Desirable:  130-159 mg/dl  Borderline high:  160-189 mg/dl  High:             190-219 mg/dl  Very high:       >219 mg/dl              Troponin I [[id number]]  Resulted: 11/03/17 1955, Result status: Final result    Ordering provider: Leonel Junior MD  11/03/17 1811 Resulting lab: [facility]    Specimen Information    Type Source Collected On   Blood  11/03/17 1924          Components       Value Reference Range Flag Lab   Troponin I ES <0.015 0.000 - 0.045 ug/L  FrStHsLb   Comment:         The 99th percentile for upper reference range is 0.045 ug/L.  Troponin values   in the range of 0.045 - 0.120 ug/L may be associated with risks of adverse   clinical events.              Vitamin D Deficiency [[id number]]  Resulted: 11/03/17 1934, Result status: In process    Ordering provider: Leonel Junior MD  11/03/17 1811 Resulting lab: [facility]    Specimen Information    Type Source Collected On   Blood  11/03/17 1924            Basic metabolic panel [[id number]] (Abnormal)  Resulted: 11/03/17 1446, Result status: Final result    Ordering provider: Carli Shoemaker MD  11/03/17 1415 Resulting lab: [facility]    Specimen Information    Type Source Collected On     11/03/17 1415          Components       Value Reference Range Flag Lab   Sodium 144 133 - 144 mmol/L  FrStHsLb   Potassium 4.0 3.4 - 5.3 mmol/L  FrStHsLb   Chloride 107 94 - 109 mmol/L  FrStHsLb   Carbon Dioxide 30 20 - 32 mmol/L  FrStHsLb   Anion Gap 7 3 - 14 mmol/L  FrStHsLb   Glucose 109 70 - 99 mg/dL H FrStHsLb   Urea Nitrogen 14 7 - 30 mg/dL  FrStHsLb   Creatinine 0.90 0.52 - 1.04 mg/dL  FrStHsLb   GFR  Estimate 62 >60 mL/min/1.7m2  FrStHsLb   Comment:  Non  GFR Calc   GFR Estimate If Black 75 >60 mL/min/1.7m2  FrStHsLb   Comment:  African American GFR Calc   Calcium 9.5 8.5 - 10.1 mg/dL  FrStHsLb            Partial thromboplastin time [[id number]]  Resulted: 11/03/17 1442, Result status: Final result    Ordering provider: Carli Shoemaker MD  11/03/17 1415 Resulting lab: [facility]    Specimen Information    Type Source Collected On     11/03/17 1415          Components       Value Reference Range Flag Lab   PTT 35 22 - 37 sec  FrStHsLb            INR [[id number]]  Resulted: 11/03/17 1442, Result status: Final result    Ordering provider: Carli Shoemaker MD  11/03/17 1415 Resulting lab: [facility]    Specimen Information    Type Source Collected On     11/03/17 1415          Components       Value Reference Range Flag Lab   INR 0.99 0.86 - 1.14  FrStHsLb            CBC with platelets differential [[id number]]  Resulted: 11/03/17 1429, Result status: Final result    Ordering provider: Carli Shoemaker MD  11/03/17 1415 Resulting lab: [facility]    Specimen Information    Type Source Collected On     11/03/17 1415          Components       Value Reference Range Flag Lab   WBC 7.7 4.0 - 11.0 10e9/L  FrStHsLb   RBC Count 4.71 3.8 - 5.2 10e12/L  FrStHsLb   Hemoglobin 15.2 11.7 - 15.7 g/dL  FrStHsLb   Hematocrit 46.1 35.0 - 47.0 %  FrStHsLb   MCV 98 78 - 100 fl  FrStHsLb   MCH 32.3 26.5 - 33.0 pg  FrStHsLb   MCHC 33.0 31.5 - 36.5 g/dL  FrStHsLb   RDW 13.4 10.0 - 15.0 %  FrStHsLb   Platelet Count 161 150 - 450 10e9/L  FrStHsLb   Diff Method Automated Method   FrStHsLb   % Neutrophils 45.1 %  FrStHsLb   % Lymphocytes 40.1 %  FrStHsLb   % Monocytes 9.3 %  FrStHsLb   % Eosinophils 4.9 %  FrStHsLb   % Basophils 0.3 %  FrStHsLb   % Immature Granulocytes 0.3 %  FrStHsLb   Nucleated RBCs 0 0 /100  FrStHsLb   Absolute Neutrophil 3.5 1.6 - 8.3 10e9/L  FrStHsLb  "  Absolute Lymphocytes 3.1 0.8 - 5.3 10e9/L  FrStHsLb   Absolute Monocytes 0.7 0.0 - 1.3 10e9/L  FrStHsLb   Absolute Eosinophils 0.4 0.0 - 0.7 10e9/L  FrStHsLb   Absolute Basophils 0.0 0.0 - 0.2 10e9/L  FrStHsLb   Abs Immature Granulocytes 0.0 0 - 0.4 10e9/L  FrStHsLb   Absolute Nucleated RBC 0.0   FrStHsLb            Testing Performed By     Lab - Abbreviation Name Director Address Valid Date Range    14 - FrStHsLb [facility] Unknown [address] [place] [address] 05/08/15 1057 - Present    45 - VQL617 [facility] Unknown Unknown 01/28/02 0000 - Present    170 - Unknown POINT OF CARE TEST, GLUCOSE Unknown Unknown 10/31/11 1114 - Present            Unresulted Labs (24h ago through future)    Start       Ordered    11/05/17 1800  Glucose Whole Blood POCT  2 TIMES DAILY,   Routine      11/05/17 1305    Unscheduled  Potassium  (Potassium Replacement - \"Standard\" - For K levels less than 3.4 mmol/L - UU,UR,UA,RH,SH,PH,WY )  CONDITIONAL (SPECIFY),   Routine     Comments:  Obtain Potassium Level for these conditions:  *IF no potassium result within 24 hours before initiation of order set, draw potassium level with next lab collect.    *2 HOURS AFTER last IV potassium replacement dose and 4 hours after an oral replacement dose.  *Next morning after potassium dose.     Repeat Potassium Replacement if necessary.    11/03/17 1811    Unscheduled  Magnesium  (Magnesium Replacement -  Adult - \"Standard\" - Replacement for all levels less than 1.6 mg/dL )  CONDITIONAL (SPECIFY),   Routine     Comments:  Obtain Magnesium Level for these conditions:  *IF no magnesium result within 24 hrs before initiation of order set, draw magnesium level with next lab collect.    *2 HOURS AFTER last magnesium replacement dose when magnesium replacement given for level less than 1.6   *Next morning after magnesium dose.     Repeat Magnesium Replacement if necessary.    11/03/17 1811         Imaging Results - 3 Days      MR Brain w/o " Contrast [[id number]]  Resulted: 11/04/17 1107, Result status: Final result    Ordering provider: Leonel Junior MD  11/03/17 1811 Resulted by: Jass Smith MD    Performed: 11/04/17 0943 - 11/04/17 1015 Resulting lab: RADIOLOGY RESULTS    Narrative:       MR BRAIN W/O CONTRAST 11/4/2017 10:15 AM     HISTORY: Seizures/cva    TECHNIQUE: Multiplanar, multisequence MRI of the brain without IV  contrast material.    COMPARISON: None.    FINDINGS: Patient motion degrades the quality of this study.  There is  generalized atrophy of the brain.  White matter changes are present in  the cerebral hemispheres that are consistent with small vessel  ischemic disease in this age patient. There is a small questionable  area of restricted diffusion in the region of the right thalamus. This  is seen on axial image 11 of series 702. This could represent a small  lacunar type infarct.. . The facial structures appear normal. The  arteries at the base of the brain and the dural venous sinuses appear  patent.      Impression:       IMPRESSION:   1. Significant patient motion artifact.  2. Question small lacunar type infarct in the region of the right  thalamus. Correlation with clinical symptoms suggested. No large  cortical infarct identified.  3. Age-related changes including generalized atrophy of the brain.  White matter changes in the cerebral hemispheres consistent with small  vessel ischemic disease in this age patient.    JUNI SMITH MD      CTA Angiogram Head Neck [[id number]]  Resulted: 11/03/17 1605, Result status: Final result    Ordering provider: Carli Shoemaker MD  11/03/17 1411 Resulted by: Prabhakar Cho MD    Performed: 11/03/17 1415 - 11/03/17 1532 Resulting lab: RADIOLOGY RESULTS    Narrative:       CT ANGIOGRAM OF THE HEAD AND NECK WITH CONTRAST  11/3/2017 3:32 PM     HISTORY: Code stroke.    TECHNIQUE:  Precontrast localizing scans were followed by CT  angiography with an injection of 70mL Isovue-370  (accession  [id number]), 50mL Isovue-370 (accession [id number]) IV with scans  through the head and neck.  Images were transferred to a separate 3-D  workstation where multiplanar reformations and 3-D images were  created.  Estimates of carotid stenoses are made relative to the  distal internal carotid artery diameters except as noted. Radiation  dose for this scan was reduced using automated exposure control,  adjustment of the mA and/or kV according to patient size, or iterative  reconstruction technique.  Perfusion scans were performed at three  levels with injection of an additional 40 mL IV nonionic contrast and  20 mL saline flush.  These images were processed on a separate 3-D  workstation.    COMPARISON: None.    CT HEAD FINDINGS:  No contrast enhancing lesions. CT perfusion images  are limited due to motion artifact.    CT ANGIOGRAM HEAD FINDINGS:  The major intracranial arteries including  the proximal branches of the anterior cerebral, middle cerebral, and  posterior cerebral arteries appear patent without vascular cutoff. No  aneurysm identified. No significant stenosis. The P1 segment of the  left posterior cerebral artery is not visualized and is likely  hypoplastic or congenitally absent. The left posterior cerebral artery  is supplied by the patent left posterior communicating artery. Venous  circulation is unremarkable.     CT ANGIOGRAM NECK FINDINGS:   Normal origin of the great vessels from the aortic arch.    Right carotid artery: The right common and internal carotid arteries  are patent.  No significant stenosis.  Torturous right common and  internal carotid arteries with a retropharyngeal course.    Left carotid artery: The left common and internal carotid arteries are  patent.  No significant stenosis.  Tortuous left common and internal  carotid arteries with a retropharyngeal course.    Vertebral arteries:  Vertebral arteries are patent without evidence of  dissection.  No significant  stenosis.      Other findings: Degenerative changes in the spine.      Impression:       IMPRESSION:   1. Patent arteries in the head and neck without vascular cutoff. No  evidence of dissection. No aneurysm identified. No significant  stenosis.  2. CT perfusion images of the head are limited due to motion artifact.    Results discussed with Carli Shoemaker at 3:45 PM on 11/3/2017.    PRABHAKAR MIN MD      CT Head w Contrast [[id number]]  Resulted: 11/03/17 1605, Result status: Final result    Ordering provider: Carli Shoemaker MD  11/03/17 1411 Resulted by: Prabhakar Min MD    Performed: 11/03/17 1415 - 11/03/17 1531 Resulting lab: RADIOLOGY RESULTS    Narrative:       CT ANGIOGRAM OF THE HEAD AND NECK WITH CONTRAST  11/3/2017 3:32 PM     HISTORY: Code stroke.    TECHNIQUE:  Precontrast localizing scans were followed by CT  angiography with an injection of 70mL Isovue-370 (accession  [id number]), 50mL Isovue-370 (accession [id number]) IV with scans  through the head and neck.  Images were transferred to a separate 3-D  workstation where multiplanar reformations and 3-D images were  created.  Estimates of carotid stenoses are made relative to the  distal internal carotid artery diameters except as noted. Radiation  dose for this scan was reduced using automated exposure control,  adjustment of the mA and/or kV according to patient size, or iterative  reconstruction technique.  Perfusion scans were performed at three  levels with injection of an additional 40 mL IV nonionic contrast and  20 mL saline flush.  These images were processed on a separate 3-D  workstation.    COMPARISON: None.    CT HEAD FINDINGS:  No contrast enhancing lesions. CT perfusion images  are limited due to motion artifact.    CT ANGIOGRAM HEAD FINDINGS:  The major intracranial arteries including  the proximal branches of the anterior cerebral, middle cerebral, and  posterior cerebral arteries appear patent without vascular cutoff. No  aneurysm  identified. No significant stenosis. The P1 segment of the  left posterior cerebral artery is not visualized and is likely  hypoplastic or congenitally absent. The left posterior cerebral artery  is supplied by the patent left posterior communicating artery. Venous  circulation is unremarkable.     CT ANGIOGRAM NECK FINDINGS:   Normal origin of the great vessels from the aortic arch.    Right carotid artery: The right common and internal carotid arteries  are patent.  No significant stenosis.  Torturous right common and  internal carotid arteries with a retropharyngeal course.    Left carotid artery: The left common and internal carotid arteries are  patent.  No significant stenosis.  Tortuous left common and internal  carotid arteries with a retropharyngeal course.    Vertebral arteries:  Vertebral arteries are patent without evidence of  dissection.  No significant stenosis.      Other findings: Degenerative changes in the spine.      Impression:       IMPRESSION:   1. Patent arteries in the head and neck without vascular cutoff. No  evidence of dissection. No aneurysm identified. No significant  stenosis.  2. CT perfusion images of the head are limited due to motion artifact.    Results discussed with Carli Shoemaker at 3:45 PM on 11/3/2017.    CHERRI MIN MD      CT Head w/o Contrast [[id number]]  Resulted: 11/03/17 1445, Result status: Final result    Ordering provider: Carli Shoemaker MD  11/03/17 1411 Resulted by: Charli Tang MD    Performed: 11/03/17 1413 - 11/03/17 1439 Resulting lab: RADIOLOGY RESULTS    Narrative:       CT HEAD W/O CONTRAST  11/3/2017 2:39 PM    HISTORY: Code stroke. Listing to the left side in the nursing  facility. Right facial droop.    TECHNIQUE: Scans were obtained through the head without IV contrast.   Radiation dose for this scan was reduced using automated exposure  control, adjustment of the mA and/or kV according to patient size, or  iterative reconstruction  technique.    COMPARISON: 10/04/2017.    FINDINGS: Moderate atrophy. Mild low-density changes in the white  matter both hemispheres consistent with chronic small vessel ischemic  disease.  No hemorrhage, mass lesion, or focal area of acute  infarction identified. Paranasal sinuses are normal. No bony  abnormality. No interval change. CT angiogram to follow.      Impression:       IMPRESSION:   1. Nothing acute.  2. Atrophy.  3. CT angiogram to follow.    RADHA WILCOX MD      Testing Performed By     Lab - Abbreviation Name Director Address Valid Date Range    104 - Rad Rslts RADIOLOGY RESULTS Unknown Unknown 05 1553 - Present               ECG/EMG Results      Echo Complete Bubble [[id number]]  Resulted: 17 0954, Result status: Edited Result - FINAL    Ordering provider: Lisbet Crews MD  17 1305 Resulted by: Joe Lugo MD    Performed: 17 1034 - 17 1035 Resulting lab: RADIOLOGY RESULTS    Narrative:       [id number]  [facility]  [id number]  [id number]^ALEISHA^LISBET^A           [facility]  Echocardiography Laboratory  [address] [place] [address]        Name: VILMA MARTINEZ  MRN: [id number]  : 1950  Study Date: 2017 09:54 AM  Age: 67 yrs  Gender: Female  Patient Location: [facility]  Reason For Study: Cerebrovascular Incident  Ordering Physician: LISBET CREWS  Referring Physician: Brianna Crespo  Performed By: Katharina Taylor RDCS     BSA: 2.2 m2  Height: 66 in  Weight: 251 lb  HR: 80  BP: 124/80 mmHg  _____________________________________________________________________________  __        Procedure  Complete Portable Bubble Echo Adult.  _____________________________________________________________________________  __        Interpretation Summary     Echoes are present in the left atrium suggestive of left atrial mass, I think  it is calcified thickening in the lateral wall consider MRI  A contrast  injection (Bubble Study) was performed that was negative for flow  across the interatrial septum.  Sinus rhythm was noted.  There is no comparison study available.  _____________________________________________________________________________  __        Left Ventricle  The left ventricle is normal in size. There is normal left ventricular wall  thickness. The visual ejection fraction is estimated at 55-60%. Grade I or  early diastolic dysfunction. Normal left ventricular wall motion.     Right Ventricle  The right ventricle is normal in structure, function and size.     Atria  Normal left atrial size. Echoes are present in the left atrium suggestive of  left atrial mass. Right atrial size is normal. Intact atrial septum. A  contrast injection (Bubble Study) was performed that was negative for flow  across the interatrial septum.     Mitral Valve  Thickened mitral valve posterior leaflet. There is mild mitral annular  calcification. There is trace to mild mitral regurgitation.        Tricuspid Valve  The tricuspid valve is normal in structure and function. Right ventricular  systolic pressure could not be approximated due to inadequate tricuspid  regurgitation.     Aortic Valve  The aortic valve is trileaflet with aortic valve sclerosis.     Vessels  Normal size aorta. Normal size ascending aorta. The IVC is normal in size and  reactivity with respiration, suggesting normal central venous pressure.     Pericardium  The pericardium appears normal.     Rhythm  Sinus rhythm was noted.     _____________________________________________________________________________  __  MMode/2D Measurements & Calculations  IVSd: 1.1 cm  LVIDd: 4.1 cm  LVIDs: 3.0 cm  LVPWd: 1.2 cm  FS: 25.9 %  EDV(Teich): 74.8 ml  ESV(Teich): 36.4 ml  LV mass(C)d: 155.5 grams  LV mass(C)dI: 70.6 grams/m2     Ao root diam: 3.2 cm  LA dimension: 3.2 cm  LA/Ao: 1.0  LVOT diam: 1.9 cm  LVOT area: 2.8 cm2  RWT: 0.57        Doppler Measurements &  Calculations  MV E max edin: 76.2 cm/sec  MV A max edin: 112.0 cm/sec  MV E/A: 0.68  MV dec slope: 340.1 cm/sec2  E/E' av.1  Lateral E/e': 8.8  Medial E/e': 13.4              _____________________________________________________________________________  __        Report approved by: Zoraida Atkinson 2017 01:12 PM       1    Type Source Collected On     17 0954          View Image (below)        Echocardiogram Complete [[id number]]  Resulted: 17 1034, Result status: In process    Ordering provider: Gilles Crews MD  17 1305 Performed: 17 1034 - 17 1034    Resulting lab: RADIANT                   Encounter-Level Documents:     There are no encounter-level documents.      Order-Level Documents:     There are no order-level documents.

## 2017-11-03 NOTE — ED PROVIDER NOTES
History     Chief Complaint:  Facial Asymmetry and One-Sided Weakness     HPI The history is obtained primarily through EMS and is limited due to the patient's mental status changes.     Zahira Sutherland is a full code 67 year old female with a history of diabetes, hypertension, and dementia who presents via EMS for evaluation of facial asymmetry and one-sided weakness. The patient is currently living at the [facility] and is reportedly conversant at baseline. This morning around 1000, staff at the patient's facility noticed that she was leaning to the left, which is abnormal for her. Around 1200, they additionally noticed that she had developed left-sided facial drooping and was having difficulties speaking. At 1330, they called EMS to have her brought into the ED for evaluation with primary concern that she could be having a stroke. On EMS' evaluation, the patient had a blood sugar of 140 and they report that she had decreased  strength in her right hand. Currently in the ED, the patient is not able to provide additional history.     Allergies:  Penicillins      Medications:    levETIRAcetam 1000 MG TABS  aspirin 81 MG chewable tablet  GABAPENTIN PO  nystatin (MYCOSTATIN) 010096 UNIT/GM POWD  OLANZAPINE PO  DULOXETINE HCL PO  cholecalciferol 2000 UNITS CAPS  Multiple Vitamin (MULTIVITAMIN) per tablet    Past Medical History:    Dementia  Diabetes mellitus, type II  Hypertension   Depression     Past Surgical History:    Hernia repair  Cholecystectomy  Gastric bypass   Back surgery     Family History:    Obesity - Mother, father, brother, and sister   Cerebrovascular disease - Mother  Cancer, breast - Mother  Cancer, colorectal - Father   Heart disease - Mother  Diabetes - Father   Hypertension - Sister     Social History:  Tobacco use:    Never smoker  Alcohol use:    Negative  Marital status:       Accompanied to ED by:  EMS   Living situation:   The patient lives at the  [facility] assisted living facility      Review of Systems   Unable to perform ROS: Mental status change     Physical Exam   First Vitals:  BP: (!) 120/95  Pulse: 90  Heart Rate: 53  Temp: 98  F (36.7  C)  Resp: 12  Weight: 115 kg (253 lb 8.5 oz)  SpO2: 97 %      Physical Exam  General: Resting on the gurney, appears uncomfortable  Head:  The scalp, face, and head appear normal  Mouth/Throat: Mucus membranes are moist  CV:  Regular rate    Normal S1 and S2  No pathological murmur   Resp:  Breath sounds clear and equal bilaterally    Non-labored, no retractions or accessory muscle use    No coarseness    No wheezing   GI:  Abdomen is soft, no rigidity    No tenderness to palpation  MS:  Good capillary refill noted.  Skin:  No rash or lesions noted.  Neuro: Right hand grasp slightly diminished. Slumping to the left.     Facial asymmetry present.     Bilateral lower extremity weakness. Lower extremity strength is symmetric.     Speech is aphasic, unable to name basic objects such as pen or paper.     Speech is slurred and difficult to understand.    The patient does not follow commands reliably.   Psych:  Awake. Alert.      Emergency Department Course     Imaging:  Radiographic findings were communicated with the patient and family who voiced understanding of the findings.    CTA Angiogram Head Neck:  IMPRESSION:   1. Patent arteries in the head and neck without vascular cutoff. No evidence of dissection. No aneurysm identified. No significant stenosis.  2. CT perfusion images of the head are limited due to motion artifact.  Per radiology.     CT Head w Contrast:  IMPRESSION:   1. Patent arteries in the head and neck without vascular cutoff. No evidence of dissection. No aneurysm identified. No significant stenosis.  2. CT perfusion images of the head are limited due to motion artifact.  Per radiology.     CT Head w/o Contrast:  IMPRESSION:   1. Nothing acute.  2. Atrophy.  3. CT angiogram to follow.  Per radiology.      Laboratory:  CBC: WNL (WBC 7.7, HGB 15.2, )  BMP: Glucose 109 high, o/w WNL (Creatinine 0.90)    INR: 0.99  Partial thromboplastin time: 35   Keppra level: Pending     Interventions:  1548 NS 1,000 mL IV  1633 Aspirin 300 mg Rectal     Emergency Department Course:  Patient was brought to the ED via EMS.     Nursing notes and vitals reviewed.  1405: I performed an exam of the patient as documented above.     1407: Code stroke.     1411: I spoke with Dr. Crews of the neurology service regarding patient's presentation, findings, and plan of care.    1422: I spoke to the patient's daughter over the phone.     1535: I spoke with Dr. Crews of the neurology service regarding patient's presentation, findings, and plan of care.    1604: I updated the patient's family.     1649: I spoke with Dr. Junior of the hospitalist service regarding patient's presentation, findings, and plan of care.     Findings and plan explained to the Patient who consents to admission. Discussed the patient with Dr. Junior, who will admit the patient to a neuro bed for further monitoring, evaluation, and treatment.     Impression & Plan      Medical Decision Making:  Zahira Sutherland is a 67 year old female presents for evaluation of weakness, left-sided facial droop, speech difficulty, and confusion.  The patient's symptoms started at 1000 and they are not in the window for TPA.   A Code Stroke was called, based on symptoms and timing.  The patient's symptoms are currently more severe compared to when they first started. Per my evaluation and neurology recommendations, TPA was not given.  She was a potential thrombectomy candidate, however no large thrombus was seen on CT. Other etiologies for the patient's symptoms, such as hypo/hyperglycemia, anemia, encephalitis, electrolyte abnormality, seizure, etc were considered, and evaluated as appropriate, however, stroke is more likely.  The patient was seen by neurology who agrees with  this assessment.  The patient is to be admitted to the hospitalist for further management and treatment.    Disposition:  The patient is admitted in stable condition.      Critical Care time:  was 15 minutes for this patient excluding procedures.    Diagnosis:    ICD-10-CM   1. 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 (H) I63.40       Disposition:  Admitted to Dr. Junior.       Scott LEE, am serving as a scribe at 2:05 PM on 11/3/2017 to document services personally performed by Dr. Shoemaker, based on my observations and the provider's statements to me.     EMERGENCY DEPARTMENT       Carli Shoemaker MD  11/05/17 0121

## 2017-11-03 NOTE — ED NOTES
[facility]  ED Nurse Handoff Report    ED Chief complaint: One-sided Weakness (staff at NH noticed pt leaning left with aphasia at approx 1000.)      ED Diagnosis:   Final diagnoses:   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 (H)       Code Status: Full Code    Allergies:   Allergies   Allergen Reactions     Penicillins        Activity level - Baseline/Home:  Total Care    Activity Level - Current:   Total Care     Needed?: No    Isolation: No  Infection: Not Applicable    Bariatric?: No    Vital Signs:   Vitals:    11/03/17 1523 11/03/17 1530 11/03/17 1545 11/03/17 1600   BP:  109/71 (!) 124/108 136/74   Pulse:  57  55   Resp: 17 12 12 12   Temp:       TempSrc:       SpO2: 97% 97% 96% 97%   Weight:           Cardiac Rhythm: ,   Cardiac  Cardiac Rhythm: Sinus bradycardia    Pain level: 0-10 Pain Scale: 0    Is this patient confused?: Yes    Patient Report: Initial Complaint: aphasia  Focused Assessment: 67 year old female with a history of diabetes, hypertension, and dementia who presents via EMS for evaluation of facial asymmetry and one-sided weakness. The patient is currently living at the [facility] assisted living [facility] and is reportedly conversant at baseline. This morning around 1000, staff at the patient's facility noticed that she was leaning to the left, which is abnormal for her. Around 1200, they additionally noticed that she had developed left-sided facial drooping and was having difficulties speaking. At 1330, they called EMS to have her brought into the ED for evaluation with primary concern that she could be having a stroke. On EMS' evaluation, the patient had a blood sugar of 140. Per family left side leaning has been for days, but facial droop and speech changes are new today.     Tests Performed: labs, EKG, head CTs  Abnormal Results: no acute abnormality on labs/CT  Treatments provided: IV fluid, ASA    Family Comments: at bedside    OBS brochure/video  discussed/provided to patient: N/A    ED Medications:   Medications   0.9% sodium chloride BOLUS (1,000 mLs Intravenous New Bag 11/3/17 7488)   aspirin Suppository 300 mg (not administered)   iopamidol (ISOVUE-370) solution 120 mL (120 mLs Intravenous Given 11/3/17 1519)   saline flush 100mL (100 mLs Intravenous Given 11/3/17 1519)       Drips infusing?:  Yes      ED NURSE PHONE NUMBER: [phone]

## 2017-11-03 NOTE — IP AVS SNAPSHOT
MRN:[id number]                      After Visit Summary   11/3/2017    Zahira Sutherland    MRN: [id number]           Thank you!     Thank you for choosing [facility] for your care. Our goal is always to provide you with excellent care. Hearing back from our patients is one way we can continue to improve our services. Please take a few minutes to complete the written survey that you may receive in the mail after you visit with us. Thank you!        Patient Information     Date Of Birth          1950        About your hospital stay     You were admitted on:  November 3, 2017 You last received care in the:  [facility]    You were discharged on:  November 6, 2017        Reason for your hospital stay       Mrs. Sutherland came to the hospital with the concerns of a stroke. The nurses at her facility saw a left facial droop and left sided weakness, the EMT personnel thought they saw a left facial droop and right sided weakness and the ED physician saw a right facial droop and right sided weakness.  The exams on the floor had fluctuating levels of weakness and both right and left nasolabial fold flattening at different times that changed quickly  An attempt at a MRI was technically difficult due to her not able to understand to hold still. 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  The family was informed of this and a discussion of the natural history of this disease and prognosis were undertaken.  She did have an echocardiogram and bubble study and the bubble study was negative. The echocardiogram suggested a left atrial mass. Cardiology was consulted and their recommendation was a cardiac MRI.  However, given her previous difficulties with the MRI scanner and the fact that there is  no evidence of cerebral emboli and her limited prognosis, it was felt this was not appropriate  I have discussed sending her back to [facility] without further testing or treatments and the family is in agreement. I also broached the possibility of Palliative care consultation with discussion about code status and whether or not she will be further treated or hospitalized.                  Who to Call     For medical emergencies, please call 911.  For non-urgent questions about your medical care, please call your primary care provider or clinic, [phone]          Attending Provider     Provider Specialty    Carli Shoemaker MD Emergency Medicine    Junior, Leonel Coelho MD Internal Medicine       Primary Care Provider Office Phone # Fax #    Brianna VergaraJenna, APRN -[phone]      After Care Instructions     Activity - Up with nursing assistance           Advance Diet as Tolerated       Follow this diet upon discharge: Orders Placed This Encounter      Room Service      Combination Diet Dysphagia Diet Level 2: Mechan Altered; Thin Liquids (water, ice chips, juice, milk gelatin, ice cream, etc)            Fall precautions           General info for SNF       Length of Stay Estimate: Long Term Care  Condition at Discharge: Stable  Level of care:board and care  Rehabilitation Potential: Poor  Admission H&P remains valid and up-to-date: Yes  Recent Chemotherapy: N/A  Use Nursing Home Standing Orders: Yes            Mantoux instructions       Give two-step Mantoux (PPD) Per Facility Policy Yes                  Further instructions from your care team       Your risk factors for stroke or TIA (transient ischemic attack):    Your Risk Factors Your Results Normal Ranges   High blood pressure BP Readings from Last 1 Encounters:   11/06/17 100/58    Less than 120/80   Cholesterol              Total Lab Results   Component Value Date    CHOL 206 11/03/2017      Less than 150     Triglycerides   Lab Results   Component Value Date    TRIG 83 11/03/2017    Less than 150   LDL Lab Results   Component Value Date     11/03/2017       Less than 70   HDL Lab Results   Component Value Date    HDL 49 11/03/2017            Greater than 40 (men)  Greater than 50 (women)   Diabetes   Recent Labs  Lab 11/03/17  1415   *    Fasting blood glucose    Smoking/tobacco use  Quit smoking and tobacco   Overweight  Lose 1-2 pounds a week   Lack of exercise  30 minutes moderate activity each day   Other risk factors include carotid (neck) artery disease, atrial fibrillation and stress. You may be on new medicine to treat high blood pressure, cholesterol, diabetes or atrial fibrillation.    Understanding Stroke Booklet given to patient. Please refer to booklet for further information.    Stroke warning signs and symptoms - CALL 911 right away for:  - Sudden numbness or weakness in the face, arm or leg (often on one side of the body).  - Sudden confusion or trouble understanding what is going on.  - Sudden blurred or decreased vision in one or both eyes.  - Sudden trouble speaking, loss of balance, dizziness or problems with coordination.  - Sudden, severe headache for no reason.  - Fainting or seizures.  - Symptoms may go away then come back suddenly.      Pending Results     Date and Time Order Name Status Description    11/3/2017 1811 Vitamin D Deficiency In process             Statement of Approval     Ordered          11/06/17 1037  I have reviewed and agree with all the recommendations and orders detailed in this document.  EFFECTIVE NOW     Approved and electronically signed by:  Derek Kimball MD             Admission Information     Date & Time Provider Department Dept. Phone    11/3/2017 Leonel Junior MD [address] [facility] [phone]      Your Vitals Were     Blood Pressure Pulse Temperature Respirations Weight Pulse Oximetry    100/58 (BP Location:  Right arm) 81 98.4  F (36.9  C) (Axillary) 16 113.9 kg (251 lb) 95%    BMI (Body Mass Index)                   40.51 kg/m2           [facility] Information     [facility] gives you secure access to your electronic health record. If you see a primary care provider, you can also send messages to your care team and make appointments. If you have questions, please call your primary care clinic.  If you do not have a primary care provider, please call [phone] and they will assist you.        Care EveryWhere ID     This is your Care EveryWhere ID. This could be used by other organizations to access your [place] medical records  [id number]        Equal Access to Services     BRO SHAHID : Yohana Logan, rosalinda campuzano, geremias bloom, ron giron. So [facility] [phone].    ATENCIÓN: Si habla español, tiene a candelario disposición servicios gratuitos de asistencia lingüística. Llame al [phone].    We comply with applicable federal civil rights laws and [place] laws. We do not discriminate on the basis of race, color, national origin, age, disability, sex, sexual orientation, or gender identity.               Review of your medicines      CONTINUE these medicines which have NOT CHANGED        Dose / Directions    aspirin 81 MG chewable tablet        Dose:  81 mg   Take 81 mg by mouth daily   Refills:  0       bisacodyl 10 MG Suppository   Commonly known as:  DULCOLAX        Dose:  10 mg   Place 10 mg rectally as needed for constipation   Refills:  0       cholecalciferol 2000 UNITS Caps        Dose:  1 capsule   Take 1 capsule by mouth daily   Refills:  0       DULOXETINE HCL PO        Dose:  30 mg   Take 30 mg by mouth daily   Refills:  0       GABAPENTIN PO        Dose:  100 mg   Take 100 mg by mouth 3 times daily   Refills:  0       levETIRAcetam 1000 MG Tabs   Used for:  Grand mal seizure (H)        Dose:  1000 mg   Take 1,000 mg by mouth 2 times daily    Quantity:  60 tablet   Refills:  0       multivitamin per tablet   Used for:  Hypertension goal BP (blood pressure) < 130/80        Dose:  1 tablet   Take 1 tablet by mouth daily.   Quantity:  90 tablet   Refills:  3       OLANZAPINE PO        Dose:  2.5 mg   Take 2.5 mg by mouth 2 times daily   Refills:  0                Protect others around you: Learn how to safely use, store and throw away your medicines at www.disposemymeds.org.             Medication List: This is a list of all your medications and when to take them. Check marks below indicate your daily home schedule. Keep this list as a reference.      Medications           Morning Afternoon Evening Bedtime As Needed    aspirin 81 MG chewable tablet   Take 81 mg by mouth daily   Last time this was given:  81 mg on 11/6/2017 10:22 AM                                bisacodyl 10 MG Suppository   Commonly known as:  DULCOLAX   Place 10 mg rectally as needed for constipation                                cholecalciferol 2000 UNITS Caps   Take 1 capsule by mouth daily                                DULOXETINE HCL PO   Take 30 mg by mouth daily   Last time this was given:  30 mg on 11/6/2017  8:46 AM                                GABAPENTIN PO   Take 100 mg by mouth 3 times daily   Last time this was given:  100 mg on 11/6/2017  8:46 AM                                levETIRAcetam 1000 MG Tabs   Take 1,000 mg by mouth 2 times daily                                multivitamin per tablet   Take 1 tablet by mouth daily.                                OLANZAPINE PO   Take 2.5 mg by mouth 2 times daily   Last time this was given:  2.5 mg on 11/6/2017  8:46 AM

## 2017-11-03 NOTE — IP AVS SNAPSHOT
` ` Patient Information     Patient Name Sex Zahira Koehler ([id number]) Female 1950       Room Bed    [facility] 0739-      Patient Demographics     Address Phone E-mail Address    [place] [id number] [facility] [address] [phone] (Home) [email or web address]      Patient Ethnicity & Race     Ethnic Group Patient Race    American White      Emergency Contact(s)     Name Relation Home Work Mobile    Rony Rice Daughter  none [phone]    Dominga Taylor Daughter [phone] none none    Niya Coelho Sister [phone] none none    Jerry Barboza Son   [phone]      Documents on File        Status Date Received Description       Documents for the Patient    Insurance Card  08     Privacy Notice - [place] Received 11     Face Sheet  () 08     Insurance Card  05/15/09     External Medication Information Consent Accepted () 11     Patient ID Scan Refused 10/04/17     Consent for Services - Hospital/Clinic Received () 11     Insurance Card Received 11     External Medication Information Consent Accepted () 08/15/11     Other Accepted 08/15/11 [facility] BCBS    [facility] Authorization - File Only       Consent for EHR Access  13 Copied from existing Consent for services - C/HOD collected on 2011    [facility] Specified Other       External Medication Information Consent Accepted 13     Consent for Services - Hospital/Clinic Received () 13     Insurance Card Received 13     [facility] Authorization - File Only Accepted 13     [facility] Authorization - File Only Received 11/15/13 [place]    Consent for Services/Privacy Notice - Hospital/Clinic       Advance Directives and Living Will Received 17 POLST 2017    Business/Insurance/Care Coordination/Health Form - Patient Received 17     [facility] Authorization - File Only  10/11/17 [facility]- CANNOT BE  PROCESSED AT THIS TIME - 10/02/17    [facility] Authorization - File Only  10/11/17 [facility] Everywhere Prospective Auth          Documents for the Encounter    CMS IM for Patient Signature Received 11/05/17 1MM    EMS/Ambulance Record  11/03/17 [place] [facility]PART[facility]    CE Point of Care Auth Received        Admission Information     Attending Provider Admitting Provider Admission Type Admission Date/Time    Vernon, Leonel Coelho MD Junior, Leonel Coelho MD Emergency 11/03/17  1408    Discharge Date Hospital Service Auth/Cert Status Service Area     Hospitalist Incomplete [facility]    Unit Room/Bed Admission Status        [facility] 0739/0739-01 Admission (Confirmed)       Admission     Complaint    CVA (cerebral vascular accident) (H)      Hospital Account     Name Acct ID Class Status Primary Coverage    Zahira Sutherland [id number] Inpatient Open [facility] - [facility] [employer]            Guarantor Account (for Hospital Account #[id number])     Name Relation to Pt Service Area Active? Acct Type    Zahira Sutherland  FCS Yes Personal/Family    Address Phone          [address] [address] [phone](H)              Coverage Information (for Hospital Account #[id number])     F/O Payor/Plan Precert #    [facility]/[facility]-SENIORS [facility]/ PARTNERS     Subscriber Subscriber #    Zahira Sutherland [id number]    Address Phone    [address]  [place] [address] [phone]

## 2017-11-04 NOTE — PROGRESS NOTES
[facility]    Hospitalist Progress Note    Date of Service (when I saw the patient): 11/04/2017    Assessment & Plan   Zahira Sutherland is a 67 year old female who was admitted on 11/3/2017. 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    Derek Kimball MD    Interval History   Ms. Sutherland is a 68 yo woman who lives at an [facility] who has had a seizure disorder diagnosed last month at [facility].  She is unable to give me a history and from the chart she may or may not have an expressive aphasia that preceeds this admission.  She was noted to have a left sided facial droop yesterday AM and some weakness. I attempted to call her daughters and neither answer their cell phones, I will try later today. I called [facility] and spoke to a nurse (Albina), she says that the aphasia is not new, she is wheelchair bound for a long while, is the recent past (about a month or so) she can't feed herself as well. She has not had previous problems with swallowing, She reports that she choked on her food 2 days ago and she has a flat affect but Albina has not noted any facial weakness.     -Data reviewed today: I reviewed all new labs and imaging results over the last 24 hours. I personally reviewed the brain MRI image(s) showing a right thalamic infarction.    Physical Exam   Temp: 98.2  F (36.8  C) Temp src: Oral BP: 111/61 Pulse: 55 Heart Rate: 54 Resp: 16 SpO2: 96 % O2  Device: None (Room air)    Vitals:    11/03/17 1427 11/04/17 0538   Weight: 115 kg (253 lb 8.5 oz) 113.9 kg (251 lb)     Vital Signs with Ranges  Temp:  [97.6  F (36.4  C)-98.2  F (36.8  C)] 98.2  F (36.8  C)  Pulse:  [54-90] 55  Heart Rate:  [51-64] 54  Resp:  [10-19] 16  BP: (109-143)/() 111/61  SpO2:  [91 %-100 %] 96 %  I/O last 3 completed shifts:  In: 1129 [I.V.:1129]  Out: -     Constitutional: Lethargic, not speaking   Respiratory: Lungs are clear to auscultation  Cardiovascular: regular rhythm, normal S1 and S2, no S3 or murmurs, no edema  GI: normal bowel sounds, not tender   Skin/Integumen: face is mark, possible rosaeca   Other:  subtle left nasolabial fold flattening, Limited response to following commands. Speech limited and slurred.  Moves both arms in a limited manner. A possible upgoing Babinski on the left. Some foot movement, not much more.     Medications     NaCl 100 mL/hr at 11/04/17 1042     - MEDICATION INSTRUCTIONS -         cholecalciferol  2,000 Units Oral Daily     DULoxetine (CYMBALTA) EC capsule 30 mg  30 mg Oral Daily     gabapentin (NEURONTIN) capsule 100 mg  100 mg Oral TID     OLANZapine zydis (zyPREXA) ODT half-tab 2.5 mg  2.5 mg Oral BID     sodium chloride (PF)  3 mL Intracatheter Q8H     sodium chloride (PF)  3 mL Intracatheter Q8H     aspirin EC  325 mg Oral Daily    Or     aspirin  300 mg Rectal Daily     levETIRAcetam  1,000 mg Intravenous Q12H       Data     Recent Labs  Lab 11/04/17  0210 11/03/17  1924 11/03/17  1415   WBC  --   --  7.7   HGB  --   --  15.2   MCV  --   --  98   PLT  --   --  161   INR  --   --  0.99   NA  --   --  144   POTASSIUM  --   --  4.0   CHLORIDE  --   --  107   CO2  --   --  30   BUN  --   --  14   CR  --   --  0.90   ANIONGAP  --   --  7   EVY  --   --  9.5   GLC  --   --  109*   TROPI <0.015 <0.015  --        Imaging:  Recent Results (from the past 24 hour(s))   CT Head w/o Contrast    Narrative    CT HEAD W/O CONTRAST  11/3/2017 2:39  PM    HISTORY: Code stroke. Listing to the left side in the nursing  facility. Right facial droop.    TECHNIQUE: Scans were obtained through the head without IV contrast.   Radiation dose for this scan was reduced using automated exposure  control, adjustment of the mA and/or kV according to patient size, or  iterative reconstruction technique.    COMPARISON: 10/04/2017.    FINDINGS: Moderate atrophy. Mild low-density changes in the white  matter both hemispheres consistent with chronic small vessel ischemic  disease.  No hemorrhage, mass lesion, or focal area of acute  infarction identified. Paranasal sinuses are normal. No bony  abnormality. No interval change. CT angiogram to follow.      Impression    IMPRESSION:   1. Nothing acute.  2. Atrophy.  3. CT angiogram to follow.    RADHA WILCOX MD   CT Head w Contrast    Narrative    CT ANGIOGRAM OF THE HEAD AND NECK WITH CONTRAST  11/3/2017 3:32 PM     HISTORY: Code stroke.    TECHNIQUE:  Precontrast localizing scans were followed by CT  angiography with an injection of 70mL Isovue-370 (accession  [id number]), 50mL Isovue-[id number] (accession [id number]) IV with scans  through the head and neck.  Images were transferred to a separate 3-D  workstation where multiplanar reformations and 3-D images were  created.  Estimates of carotid stenoses are made relative to the  distal internal carotid artery diameters except as noted. Radiation  dose for this scan was reduced using automated exposure control,  adjustment of the mA and/or kV according to patient size, or iterative  reconstruction technique.  Perfusion scans were performed at three  levels with injection of an additional 40 mL IV nonionic contrast and  20 mL saline flush.  These images were processed on a separate 3-D  workstation.    COMPARISON: None.    CT HEAD FINDINGS:  No contrast enhancing lesions. CT perfusion images  are limited due to motion artifact.    CT ANGIOGRAM HEAD FINDINGS:  The major intracranial  arteries including  the proximal branches of the anterior cerebral, middle cerebral, and  posterior cerebral arteries appear patent without vascular cutoff. No  aneurysm identified. No significant stenosis. The P1 segment of the  left posterior cerebral artery is not visualized and is likely  hypoplastic or congenitally absent. The left posterior cerebral artery  is supplied by the patent left posterior communicating artery. Venous  circulation is unremarkable.     CT ANGIOGRAM NECK FINDINGS:   Normal origin of the great vessels from the aortic arch.    Right carotid artery: The right common and internal carotid arteries  are patent.  No significant stenosis.  Torturous right common and  internal carotid arteries with a retropharyngeal course.    Left carotid artery: The left common and internal carotid arteries are  patent.  No significant stenosis.  Tortuous left common and internal  carotid arteries with a retropharyngeal course.    Vertebral arteries:  Vertebral arteries are patent without evidence of  dissection.  No significant stenosis.      Other findings: Degenerative changes in the spine.      Impression    IMPRESSION:   1. Patent arteries in the head and neck without vascular cutoff. No  evidence of dissection. No aneurysm identified. No significant  stenosis.  2. CT perfusion images of the head are limited due to motion artifact.    Results discussed with Carli Shoemaker at 3:45 PM on 11/3/2017.    CHERRI MIN MD   CTA Angiogram Head Neck    Narrative    CT ANGIOGRAM OF THE HEAD AND NECK WITH CONTRAST  11/3/2017 3:32 PM     HISTORY: Code stroke.    TECHNIQUE:  Precontrast localizing scans were followed by CT  angiography with an injection of 70mL Isovue-370 (accession  [id number]), 50mL Isovue-[id number] (accession [id number]) IV with scans  through the head and neck.  Images were transferred to a separate 3-D  workstation where multiplanar reformations and 3-D images were  created.  Estimates of carotid stenoses  are made relative to the  distal internal carotid artery diameters except as noted. Radiation  dose for this scan was reduced using automated exposure control,  adjustment of the mA and/or kV according to patient size, or iterative  reconstruction technique.  Perfusion scans were performed at three  levels with injection of an additional 40 mL IV nonionic contrast and  20 mL saline flush.  These images were processed on a separate 3-D  workstation.    COMPARISON: None.    CT HEAD FINDINGS:  No contrast enhancing lesions. CT perfusion images  are limited due to motion artifact.    CT ANGIOGRAM HEAD FINDINGS:  The major intracranial arteries including  the proximal branches of the anterior cerebral, middle cerebral, and  posterior cerebral arteries appear patent without vascular cutoff. No  aneurysm identified. No significant stenosis. The P1 segment of the  left posterior cerebral artery is not visualized and is likely  hypoplastic or congenitally absent. The left posterior cerebral artery  is supplied by the patent left posterior communicating artery. Venous  circulation is unremarkable.     CT ANGIOGRAM NECK FINDINGS:   Normal origin of the great vessels from the aortic arch.    Right carotid artery: The right common and internal carotid arteries  are patent.  No significant stenosis.  Torturous right common and  internal carotid arteries with a retropharyngeal course.    Left carotid artery: The left common and internal carotid arteries are  patent.  No significant stenosis.  Tortuous left common and internal  carotid arteries with a retropharyngeal course.    Vertebral arteries:  Vertebral arteries are patent without evidence of  dissection.  No significant stenosis.      Other findings: Degenerative changes in the spine.      Impression    IMPRESSION:   1. Patent arteries in the head and neck without vascular cutoff. No  evidence of dissection. No aneurysm identified. No significant  stenosis.  2. CT perfusion  images of the head are limited due to motion artifact.    Results discussed with Carli Shoemaker at 3:45 PM on 11/3/2017.    CHERRI MIN MD   MR Brain w/o Contrast    Narrative    MR BRAIN W/O CONTRAST 11/4/2017 10:15 AM     HISTORY: Seizures/cva    TECHNIQUE: Multiplanar, multisequence MRI of the brain without IV  contrast material.    COMPARISON: None.    FINDINGS: Patient motion degrades the quality of this study.  There is  generalized atrophy of the brain.  White matter changes are present in  the cerebral hemispheres that are consistent with small vessel  ischemic disease in this age patient. There is a small questionable  area of restricted diffusion in the region of the right thalamus. This  is seen on axial image 11 of series 702. This could represent a small  lacunar type infarct.. . The facial structures appear normal. The  arteries at the base of the brain and the dural venous sinuses appear  patent.      Impression    IMPRESSION:   1. Significant patient motion artifact.  2. Question small lacunar type infarct in the region of the right  thalamus. Correlation with clinical symptoms suggested. No large  cortical infarct identified.  3. Age-related changes including generalized atrophy of the brain.  White matter changes in the cerebral hemispheres consistent with small  vessel ischemic disease in this age patient.

## 2017-11-04 NOTE — PROGRESS NOTES
Paged by nurse regarding BG 61. Hx DM2 diet controlled not on any oral antidiabetics and not on insulin. Will initiate hypoglycemia protocol. Continue NS @ 100ml/hr

## 2017-11-04 NOTE — PLAN OF CARE
Problem: Patient Care Overview  Goal: Plan of Care/Patient Progress Review  Pt is alert but not able to assess orientation as she has not been gavino to converse with nurse. Pt says one word answers intermittently to staff. Pt has right sided hemiplegia with Left facial droop. Pt's VSS. Pt has been incontinent of BB x 2. Tele SB with dysrhythmia and BBB. Pt is currently  NPO till assessed by speech. Pt is up w/2 lift. Denies pain. Plan To have Neuro consult and stroke w/u.

## 2017-11-04 NOTE — PLAN OF CARE
Problem: Patient Care Overview  Goal: Plan of Care/Patient Progress Review  OT: orders received and chart reviewed. Pt has not had full work up yet for stroke like symptoms, spoke with nursing she states pt not appropriate for start of eval today. Pt is total cares at this time, non verbal and pending neuro consult. Will check back on appropriateness tomorrow.

## 2017-11-04 NOTE — PLAN OF CARE
Problem: Patient Care Overview  Goal: Plan of Care/Patient Progress Review  PT/OT: Orders received and chart reviewed. Patient is a 68 y/o female admitted with R sided facial droop and extremity weakness. Patient lives in long term care at [facility]. MRI results pending. Per discussion with patient's daughter, Rony, patient is wheelchair bound at baseline and requires a lift for transfers. Patient is total cares for ADLs and feeding. No IP PT needs indicated as patient requires lift at baseline.

## 2017-11-04 NOTE — PLAN OF CARE
Problem: Patient Care Overview  Goal: Plan of Care/Patient Progress Review  Discharge Planner SLP   Patient plan for discharge: LTC facility  Current status: Bedside swallow eval completed: Pt is likely at baseline for swallowing and speech, and is demonstrating similar behaviors with PO intake as previous swallow eval on 10/2/17. No overt s/sx of aspiration with thins, purees or cracker. No worrisome oral residue. Minimal delay in hyolaryngeal elevation onset time that does not appear to affect safety of swallowing. Needs 1:1 assist with all PO intake as she is likely to spill or lack initiation to eat independently. Y/N questions asked, but pt most of the time stared blankly. One time she nodded yes, correctly, in response to a question. She did start speaking but only a few short words/phrases, mostly when staff spoke about her daughter. REC: Dysphagia diet 2 and thins. 1:1 assist. Pt is likely at baseline based on what we have heard from staff at the pt's LTC facility. Meds crushed in purees. Standard aspiration precautions recommended. No further speech therapy needs anticipated.   Barriers to return to prior living situation: Weakness, acuity of illness  Recommendations for discharge: LTCU  Rationale for recommendations: weakness, cognitive impairment.        Entered by: Neris Winn 11/04/2017 12:28 PM

## 2017-11-04 NOTE — PROGRESS NOTES
11/04/17 1200   General Information   Onset Date 11/03/17   Start of Care Date 11/04/17   Referring Physician Vernon, Leonel LOPEZ   Patient Profile Review/OT: Additional Occupational Profile Info See Profile for full history and prior level of function   Patient/Family Goals Statement none stated   Swallowing Evaluation Bedside swallow evaluation   Behaviorial Observations Distractible;Initiation problems   Mode of current nutrition NPO   Comments Per MD note:Zahira Sutherland is a full code 67 year old female with a history of diabetes, hypertension, and dementia who presents via EMS for evaluation of facial asymmetry and one-sided weakness. The patient is currently living at the [facility] living [facility] and is reportedly conversant at baseline. This morning around 1000, staff at the patient's facility noticed that she was leaning to the left, which is abnormal for her. Around 1200, they additionally noticed that she had developed left-sided facial drooping and was having difficulties speaking.  Therapy has spoken with the facility, who report the pt can respond to y/n questions and eats finger foods. In separate note, Dr Kimball noted that the pt has been known to see assistance with eating.    Clinical Swallow Evaluation   Oral Musculature unable to assess due to poor participation/comprehension   Structural Abnormalities none present   Dentition present and adequate   Oral Musculature Comments Poor participation/following directions   Clinical Swallow Eval: Thin Liquid Texture Trial   Mode of Presentation, Thin Liquids cup   Volume of Liquid or Food Presented 4 tsp   Oral Phase of Swallow WFL   Pharyngeal Phase of Swallow intact   Diagnostic Statement No impairment   Clinical Swallow Eval: Puree Solid Texture Trial   Mode of Presentation, Puree spoon   Volume of Puree Presented 2 tsp   Oral Phase, Puree WFL   Pharyngeal Phase, Puree intact   Diagnostic Statement No impairment   Clinical Swallow Eval:  Solid Food Texture Trial   Mode of Presentation, Solid fed by clinician   Volume of Solid Food Presented 1 bite   Oral Phase, Solid WFL   Pharyngeal Phase, Solid intact   Diagnostic Statement No impairment   Esophageal Phase of Swallow   Patient reports or presents with symptoms of esophageal dysphagia No   Swallow Eval: Clinical Impressions   Skilled Criteria for Therapy Intervention No problems identified which require skilled intervention   Functional Assessment Scale (FAS) 7   Diet texture recommendations Dysphagia diet level 2;Thin liquids   Recommended Feeding/Eating Techniques small sips/bites;maintain upright posture during/after eating for 30 mins   Risks and Benefits of Treatment have been explained. Yes   Patient, family and/or staff in agreement with Plan of Care Yes   Clinical Impression Comments Bedside swallow eval completed: Pt is likely at baseline for swallowing and speech, and is demonstrating similar behaviors with PO intake as previous swallow eval on 10/2/17. No overt s/sx of aspiration with thins, purees or cracker. No worrisome oral residue. Minimal delay in hyolaryngeal elevation onset time that does not appear to affect safety of swallowing. Needs 1:1 assist with all PO intake as she is likely to spill or lack initiation to eat independently. Y/N questions asked, but pt most of the time stared blankly. One time she nodded yes, correctly, in response to a question. She did start speaking but only a few short words/phrases, mostly when staff spoke about her daughter. REC: Dysphagia diet 2 and thins. 1:1 assist. Pt is likely at baseline based on what we have heard from staff at the pt's LTC facility. Meds crushed in purees. Standard aspiration precautions recommended. No further speech therapy needs anticipated.    Total Evaluation Time   Total Evaluation Time (Minutes) 25

## 2017-11-04 NOTE — PLAN OF CARE
Problem: Patient Care Overview  Goal: Plan of Care/Patient Progress Review  Outcome: No Change  Disoriented/difficulty to assess due to aphasia. Neuros - alert/eyes open all day, unable to communicate her needs, not following commands/directions, calm, one word responses at times/no conversation, moving left upper better/some contraction in right hand, bilateral babinski reflexes present. Meds crushed in applesauce this afternoon, diet advanced to DD2 with thin liquids/poor appetite.  VSS. Tele recorded. Bedrest with 2-3 assist with cares/repositioning. Oral cares provided.  Frequent incontinence of bowel/bladder. Tylenol provided for groan with turns. No seizure activity noted. Bruising on right arm noted/skin irritation under abdominal folds/breast/powder applied.
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
## 2017-11-04 NOTE — PLAN OF CARE
Problem: Patient Care Overview  Goal: Plan of Care/Patient Progress Review  Outcome: No Change  Patient here due to sudden onset of right sided weakness, left facial droop and speech difficulties. A&O - LORIN because patient is non verbal. Neuros include right side hemiplegia, left facial droop and speech difficulties. VSS on RA. Tele SB with 1st degree AVB. NPO pending speech evaluation. Up with A2 + lift. Denies pain. Continue to monitor and follow POC.
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
## 2017-11-05 NOTE — PLAN OF CARE
Problem: Patient Care Overview  Goal: Plan of Care/Patient Progress Review  Outcome: No Change  VSS. Pt opens eyes when awake, remained aphasic.  Unable to follow commands.  Unable to assess orientation.  Pt. has not conversed with staff.   Pt has right sided hemiplegia with Left facial droop.  Tele monitored.  Repositioned with A3 during the night.  No seizures noted this shift.

## 2017-11-05 NOTE — PROGRESS NOTES
"Neurology Progress Note    S: Has been eating with assistance. Says \"good morning\" today in response to my greeting.  O: VSS afebrile. Awake, no apparent weakness. She slurred \"good morning\" an dshe tracks, but does not respond or follow commands. No apparent focal weakness today.  A: Awaiting ECHO for completion of stroke work up. Suspect FTLD. Possibly a thalamic infarct may have contributed to the recent decline.  TERESO Crews MD  "

## 2017-11-05 NOTE — PLAN OF CARE
Problem: Stroke (Ischemic) (Adult)  Goal: Signs and Symptoms of Listed Potential Problems Will be Absent, Minimized or Managed (Stroke)  Signs and symptoms of listed potential problems will be absent, minimized or managed by discharge/transition of care (reference Stroke (Ischemic) (Adult) CPG).   Outcome: No Change  VSS but jerrica at times. Neuros unchanged, Pt opens eyes when awake, remained aphasic, quizzing slightly both upper extremities with some contraction on the R hand. Repositioned  with a lift & A2. Oral care done, was incontinent of bowel/bladder x2. No seizures noted this shift and will continue to monitor.

## 2017-11-05 NOTE — PLAN OF CARE
Problem: Patient Care Overview  Goal: Plan of Care/Patient Progress Review  Outcome: No Change  Disoriented, nonverbal/no conversation/one word responses/illogical. Neuros - not following directions/unable to communicate her needs, slight left facial droop noted, left upper hemiparesis/right hemiplegia - slight contraction of right hand, moderate grasp with left hand, bilateral lower extremity severely weak - babinski reflex positive. VSS. Tele monitored. Regular diet with thin liquids/total feed/superivisoin needed/fluid intake encouraged/oral cares provided. Bedrest with 2-3 assist/ceiling lift used. Appears comfortable, resting between cares.  Incontinent of bowel & bladder/frequent positioning. Possible discharge to [facility] tomorrow per Dr. Kimball.

## 2017-11-06 NOTE — DISCHARGE INSTRUCTIONS
Your risk factors for stroke or TIA (transient ischemic attack):    Your Risk Factors Your Results Normal Ranges   High blood pressure BP Readings from Last 1 Encounters:   11/06/17 100/58    Less than 120/80   Cholesterol              Total Lab Results   Component Value Date    CHOL 206 11/03/2017      Less than 150    Triglycerides   Lab Results   Component Value Date    TRIG 83 11/03/2017    Less than 150   LDL Lab Results   Component Value Date     11/03/2017       Less than 70   HDL Lab Results   Component Value Date    HDL 49 11/03/2017            Greater than 40 (men)  Greater than 50 (women)   Diabetes   Recent Labs  Lab 11/03/17  1415   *    Fasting blood glucose    Smoking/tobacco use  Quit smoking and tobacco   Overweight  Lose 1-2 pounds a week   Lack of exercise  30 minutes moderate activity each day   Other risk factors include carotid (neck) artery disease, atrial fibrillation and stress. You may be on new medicine to treat high blood pressure, cholesterol, diabetes or atrial fibrillation.    Understanding Stroke Booklet given to patient. Please refer to booklet for further information.    Stroke warning signs and symptoms - CALL 911 right away for:  - Sudden numbness or weakness in the face, arm or leg (often on one side of the body).  - Sudden confusion or trouble understanding what is going on.  - Sudden blurred or decreased vision in one or both eyes.  - Sudden trouble speaking, loss of balance, dizziness or problems with coordination.  - Sudden, severe headache for no reason.  - Fainting or seizures.  - Symptoms may go away then come back suddenly.

## 2017-11-06 NOTE — DISCHARGE SUMMARY
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 Due to her dementia, she was unable to hold still The Radiologist felt there was a possible right lacunar infarct but the Neurologist felt this was artifact. It was felt not appropriate to sedate her to repeat the MRI  She had a echocardiogram and bubble study as part of the stroke workup and the bubble study was negative for potential paradoxical emboli, but the left atrium suggested a mass. Cardiology was consulted and their recommendation for further workup would be a cardiac MRI. Given she has no signs of cerebral emboli, the difficultiies with the head MRI and her Frontal Temporal dementia, prognosis and what the therapy would be for a cardiac mass (surgery or observation) it was felt a cardiac MRI was not indicated at this time  I have discussed the diagnosis with the family.  I have strongly suggested a  Palliative medicine consultation when she is back at [facility] with discussion of how to maximize her quality of life, consideration of changing her code status and consideration of any further hospitalization or antibiotic treatment.    Hospital Course   Zahira Sutherland was admitted on 11/3/2017.  The following problems were addressed during her hospitalization:        Derek Kimball MD    Significant Results and Procedures   See the consultation notes, H&P, progress notes, labs and  imaging    Pending Results   These results will be followed up by Ms. Crespo  Unresulted Labs Ordered in the Past 30 Days of this Admission     Date and Time Order Name Status Description    11/3/2017 1811 Vitamin D Deficiency In process           Code Status   Full Code This needs further discussion       Primary Care Physician   Brianna Bullock Zak    Physical Exam   Temp: 98.4  F (36.9  C) Temp src: Axillary BP: 100/58 Pulse: 81 Heart Rate: 53 Resp: 16 SpO2: 95 % O2 Device: None (Room air)    Vitals:    11/03/17 1427 11/04/17 0538   Weight: 115 kg (253 lb 8.5 oz) 113.9 kg (251 lb)     Vital Signs with Ranges  Temp:  [97.7  F (36.5  C)-98.6  F (37  C)] 98.4  F (36.9  C)  Pulse:  [81] 81  Heart Rate:  [48-63] 53  Resp:  [16] 16  BP: (100-131)/(56-86) 100/58  SpO2:  [91 %-95 %] 95 %  I/O last 3 completed shifts:  In: 350 [P.O.:350]  Out: -     Constitutional: opens eyes when spoken to, usually one or no word response and then no further speech  Eyes: clear, EOM full but she doesn't tract well  ENT: not examined, mouth moist  Respiratory: lungs are clear to auscultation  Cardiovascular: regular rhythm, normal S1 and S2, no murmurs or S3, no edema  GI: normal bowel sounds, not tender  Lymph/Hematologic: not examined  Genitourinary: not examined  Skin: intact  Musculoskeletal: no wasting.   Neurologic: Extraocular movements full, but hard to exam. Depending on her facial expression, either the right or left nasolabial fold can appear flattened. Moves both arms and legs in a limited fashion, often inconsistent, but no consistent weakness  Neuropsychiatric: alert, limited verbal response    Discharge Disposition   Discharged to long-term care facility  Condition at discharge: Guarded    Consultations This Hospital Stay   NEUROLOGY IP CONSULT  PHYSICAL THERAPY ADULT IP CONSULT  OCCUPATIONAL THERAPY ADULT IP CONSULT  SPEECH LANGUAGE PATH ADULT IP CONSULT  SWALLOW EVAL SPEECH PATH AT BEDSIDE IP  CONSULT  SMOKING CESSATION PROGRAM IP CONSULT  PHARMACY IP CONSULT  CARDIOLOGY IP CONSULT    Time Spent on this Encounter   I, Derek Kimball, personally saw the patient today and spent greater than 30 minutes discharging this patient.    Discharge Orders     General info for SNF   Length of Stay Estimate: Long Term Care  Condition at Discharge: Stable  Level of care:board and care  Rehabilitation Potential: Poor  Admission H&P remains valid and up-to-date: Yes  Recent Chemotherapy: N/A  Use Nursing Home Standing Orders: Yes     Mantoux instructions   Give two-step Mantoux (PPD) Per Facility Policy Yes     Reason for your hospital stay   Mrs. Sutherland came to the hospital with the concerns of a stroke. The nurses at her facility saw a left facial droop and left sided weakness, the EMT personnel thought they saw a left facial droop and right sided weakness and the ED physician saw a right facial droop and right sided weakness.  The exams on the floor had fluctuating levels of weakness and both right and left nasolabial fold flattening at different times that changed quickly  An attempt at a MRI was technically difficult due to her not able to understand to hold still. There was a suggestion of a right basal ganglia infarct but the neurologist felt this was artifact  Further history from her nurse at [facility] and with the family was consistent with Frontal Temporal dementia, not Alzheimer's.  The family was informed of this and a discussion of the natural history of this disease and prognosis were undertaken.  She did have an echocardiogram and bubble study and the bubble study was negative. The echocardiogram suggested a left atrial mass. Cardiology was consulted and their recommendation was a cardiac MRI.  However, given her previous difficulties with the MRI scanner and the fact that there is no evidence of cerebral emboli and her limited prognosis, it was felt this was not appropriate  I have discussed  sending her back to [facility] without further testing or treatments and the family is in agreement. I also broached the possibility of Palliative care consultation with discussion about code status and whether or not she will be further treated or hospitalized.     Activity - Up with nursing assistance     Full Code     Fall precautions     Advance Diet as Tolerated   Follow this diet upon discharge: Orders Placed This Encounter     Room Service     Combination Diet Dysphagia Diet Level 2: Mechan Altered; Thin Liquids (water, ice chips, juice, milk gelatin, ice cream, etc)       Discharge Medications   Current Discharge Medication List      CONTINUE these medications which have NOT CHANGED    Details   bisacodyl (DULCOLAX) 10 MG Suppository Place 10 mg rectally as needed for constipation      levETIRAcetam 1000 MG TABS Take 1,000 mg by mouth 2 times daily  Qty: 60 tablet, Refills: 0    Associated Diagnoses: Grand mal seizure (H)      aspirin 81 MG chewable tablet Take 81 mg by mouth daily      GABAPENTIN PO Take 100 mg by mouth 3 times daily      OLANZAPINE PO Take 2.5 mg by mouth 2 times daily      DULOXETINE HCL PO Take 30 mg by mouth daily      cholecalciferol 2000 UNITS CAPS Take 1 capsule by mouth daily      Multiple Vitamin (MULTIVITAMIN) per tablet Take 1 tablet by mouth daily.  Qty: 90 tablet, Refills: 3    Associated Diagnoses: Hypertension goal BP (blood pressure) < 130/80           Allergies   Allergies   Allergen Reactions     Penicillins      Data   Most Recent 3 CBC's:  Recent Labs   Lab Test  11/03/17   1415  10/04/17   0305  09/28/17   0810   WBC  7.7  10.7  8.9   HGB  15.2  13.0  14.5   MCV  98  99  99   PLT  161  151  179      Most Recent 3 BMP's:  Recent Labs   Lab Test  11/03/17   1415  10/04/17   0305  09/29/17   0858   NA  144  141  142   POTASSIUM  4.0  5.0  3.8   CHLORIDE  107  111*  109   CO2  30  30  29   BUN  14  27  15   CR  0.90  0.78  0.80   ANIONGAP  7  <1*  4   EVY  9.5  8.0*   8.4*   GLC  109*  82  88     Most Recent 2 LFT's:  Recent Labs   Lab Test  10/04/17   0305  09/29/17   0858   AST  41  13   ALT  48  24   ALKPHOS  69  86   BILITOTAL  0.4  0.8     Most Recent INR's and Anticoagulation Dosing History:  Anticoagulation Dose History     Recent Dosing and Labs Latest Ref Rng & Units 11/3/2017    INR 0.86 - 1.14 0.99        Most Recent 3 Troponin's:  Recent Labs   Lab Test  11/04/17   0210  11/03/17   1924  09/28/17   0819   TROPI  <0.015  <0.015   --    TROPONIN   --    --   0.00     Most Recent Cholesterol Panel:  Recent Labs   Lab Test  11/03/17 1924   CHOL  206*   LDL  140*   HDL  49*   TRIG  83     Most Recent 6 Bacteria Isolates From Any Culture (See EPIC Reports for Culture Details):  Recent Labs   Lab Test  10/01/17   0520  05/08/12   1453  08/17/11   1900  07/25/11   2215  07/25/11   2210   CULT  >100,000 colonies/mL  Escherichia coli  *  10 to 50,000 colonies/mL Multiple species present, probable perineal  contamination.  10 to 50,000 colonies/mL Multiple species present, probable perineal  contamination.  No growth after 6 days  No growth after 6 days     Most Recent TSH, T4 and A1c Labs:  Recent Labs   Lab Test  11/03/17 1924   TSH  3.20   A1C  5.6     Results for orders placed or performed during the hospital encounter of 11/03/17   CTA Angiogram Head Neck    Narrative    CT ANGIOGRAM OF THE HEAD AND NECK WITH CONTRAST  11/3/2017 3:32 PM     HISTORY: Code stroke.    TECHNIQUE:  Precontrast localizing scans were followed by CT  angiography with an injection of 70mL Isovue-370 (accession  [id number]), 50mL Isovue-370 (accession [id number]) IV with scans  through the head and neck.  Images were transferred to a separate 3-D  workstation where multiplanar reformations and 3-D images were  created.  Estimates of carotid stenoses are made relative to the  distal internal carotid artery diameters except as noted. Radiation  dose for this scan was reduced using automated exposure  control,  adjustment of the mA and/or kV according to patient size, or iterative  reconstruction technique.  Perfusion scans were performed at three  levels with injection of an additional 40 mL IV nonionic contrast and  20 mL saline flush.  These images were processed on a separate 3-D  workstation.    COMPARISON: None.    CT HEAD FINDINGS:  No contrast enhancing lesions. CT perfusion images  are limited due to motion artifact.    CT ANGIOGRAM HEAD FINDINGS:  The major intracranial arteries including  the proximal branches of the anterior cerebral, middle cerebral, and  posterior cerebral arteries appear patent without vascular cutoff. No  aneurysm identified. No significant stenosis. The P1 segment of the  left posterior cerebral artery is not visualized and is likely  hypoplastic or congenitally absent. The left posterior cerebral artery  is supplied by the patent left posterior communicating artery. Venous  circulation is unremarkable.     CT ANGIOGRAM NECK FINDINGS:   Normal origin of the great vessels from the aortic arch.    Right carotid artery: The right common and internal carotid arteries  are patent.  No significant stenosis.  Torturous right common and  internal carotid arteries with a retropharyngeal course.    Left carotid artery: The left common and internal carotid arteries are  patent.  No significant stenosis.  Tortuous left common and internal  carotid arteries with a retropharyngeal course.    Vertebral arteries:  Vertebral arteries are patent without evidence of  dissection.  No significant stenosis.      Other findings: Degenerative changes in the spine.      Impression    IMPRESSION:   1. Patent arteries in the head and neck without vascular cutoff. No  evidence of dissection. No aneurysm identified. No significant  stenosis.  2. CT perfusion images of the head are limited due to motion artifact.    Results discussed with Carli Shoemaker at 3:45 PM on 11/3/2017.    CHERRI MIN MD   CT Head w  Contrast    Narrative    CT ANGIOGRAM OF THE HEAD AND NECK WITH CONTRAST  11/3/2017 3:32 PM     HISTORY: Code stroke.    TECHNIQUE:  Precontrast localizing scans were followed by CT  angiography with an injection of 70mL Isovue-370 (accession  [id number]), 50mL Isovue-370 (accession [id number]) IV with scans  through the head and neck.  Images were transferred to a separate 3-D  workstation where multiplanar reformations and 3-D images were  created.  Estimates of carotid stenoses are made relative to the  distal internal carotid artery diameters except as noted. Radiation  dose for this scan was reduced using automated exposure control,  adjustment of the mA and/or kV according to patient size, or iterative  reconstruction technique.  Perfusion scans were performed at three  levels with injection of an additional 40 mL IV nonionic contrast and  20 mL saline flush.  These images were processed on a separate 3-D  workstation.    COMPARISON: None.    CT HEAD FINDINGS:  No contrast enhancing lesions. CT perfusion images  are limited due to motion artifact.    CT ANGIOGRAM HEAD FINDINGS:  The major intracranial arteries including  the proximal branches of the anterior cerebral, middle cerebral, and  posterior cerebral arteries appear patent without vascular cutoff. No  aneurysm identified. No significant stenosis. The P1 segment of the  left posterior cerebral artery is not visualized and is likely  hypoplastic or congenitally absent. The left posterior cerebral artery  is supplied by the patent left posterior communicating artery. Venous  circulation is unremarkable.     CT ANGIOGRAM NECK FINDINGS:   Normal origin of the great vessels from the aortic arch.    Right carotid artery: The right common and internal carotid arteries  are patent.  No significant stenosis.  Torturous right common and  internal carotid arteries with a retropharyngeal course.    Left carotid artery: The left common and internal carotid arteries  are  patent.  No significant stenosis.  Tortuous left common and internal  carotid arteries with a retropharyngeal course.    Vertebral arteries:  Vertebral arteries are patent without evidence of  dissection.  No significant stenosis.      Other findings: Degenerative changes in the spine.      Impression    IMPRESSION:   1. Patent arteries in the head and neck without vascular cutoff. No  evidence of dissection. No aneurysm identified. No significant  stenosis.  2. CT perfusion images of the head are limited due to motion artifact.    Results discussed with Carli Shoemaker at 3:45 PM on 11/3/2017.    CHERRI MIN MD   CT Head w/o Contrast    Narrative    CT HEAD W/O CONTRAST  11/3/2017 2:39 PM    HISTORY: Code stroke. Listing to the left side in the nursing  facility. Right facial droop.    TECHNIQUE: Scans were obtained through the head without IV contrast.   Radiation dose for this scan was reduced using automated exposure  control, adjustment of the mA and/or kV according to patient size, or  iterative reconstruction technique.    COMPARISON: 10/04/2017.    FINDINGS: Moderate atrophy. Mild low-density changes in the white  matter both hemispheres consistent with chronic small vessel ischemic  disease.  No hemorrhage, mass lesion, or focal area of acute  infarction identified. Paranasal sinuses are normal. No bony  abnormality. No interval change. CT angiogram to follow.      Impression    IMPRESSION:   1. Nothing acute.  2. Atrophy.  3. CT angiogram to follow.    RADHA WILCOX MD   MR Brain w/o Contrast    Narrative    MR BRAIN W/O CONTRAST 11/4/2017 10:15 AM     HISTORY: Seizures/cva    TECHNIQUE: Multiplanar, multisequence MRI of the brain without IV  contrast material.    COMPARISON: None.    FINDINGS: Patient motion degrades the quality of this study.  There is  generalized atrophy of the brain.  White matter changes are present in  the cerebral hemispheres that are consistent with small vessel  ischemic  disease in this age patient. There is a small questionable  area of restricted diffusion in the region of the right thalamus. This  is seen on axial image 11 of series 702. This could represent a small  lacunar type infarct.. . The facial structures appear normal. The  arteries at the base of the brain and the dural venous sinuses appear  patent.      Impression    IMPRESSION:   1. Significant patient motion artifact.  2. Question small lacunar type infarct in the region of the right  thalamus. Correlation with clinical symptoms suggested. No large  cortical infarct identified.  3. Age-related changes including generalized atrophy of the brain.  White matter changes in the cerebral hemispheres consistent with small  vessel ischemic disease in this age patient.    JUNI WILKINS MD

## 2017-11-06 NOTE — PLAN OF CARE
Problem: Patient Care Overview  Goal: Plan of Care/Patient Progress Review  Outcome: Adequate for Discharge Date Met:  11/06/17  Pt with baseline dementia, LORIN orientation, does not follow comands. Moves upper extremities purposefully, withdraws from pain in BLE. No focal weakness noted. Unable to complete NIH. VSS. Tele SB w/ 1st degree AVB. DD2 diet with thin liquids, pills crushed, total feed. Incontinent. Up with lift, assist*2, t/r q2h. Denies pain. Plan d/c back to [facility] w/ plans for a palliative meeting with family.

## 2017-11-06 NOTE — PROGRESS NOTES
CM    I: SW received call from [facility] asking if they can pick patient up at 12:10 instead of 12:30.  SW spoke w/[facility] staff, who agreed with plan.    P:  No further SW interventions anticipated at this time.    RYLAN Carvalho

## 2017-11-06 NOTE — PROGRESS NOTES
Dr Kimball called me on this patient. She was unable to have an MRI of her head for stroke on this admission because of confusion and l;ack of cooperation . He informs that patient has significant frontal lobe dementia and overall has a poorer prognosis and unlikely that one would act on the results of an MRI even if this could be done. Please let me know if one wants to continue with cardiac conusltation as it does appear that MRI would be unlikely to be successfully performed on this patient who has significant other medical issues. Thanks

## 2017-11-06 NOTE — PROGRESS NOTES
SW:  D: Pt admitted on 11/3/17 from [facility] with CVA. Pt cleared for discharge back to [facility] today.  I: INGRID contacted Yessy at [facility] to confirm pt's return today  Discharge orders faxed to [phone] per request.  Transportation ordered though [facility] for stretcher ride at 12:30 pm today. Pt requiring 2 person or lift for mobility. Pt is aphasic and has sever frontal lobe dementia requiring supervision during transport for safety.  PCS form completed, faxed and placed on pt chart.    CC, RN, HUC,BB, [facility] and pt's family updated with discharge plan.  No PAS required.    RYLAN Singh  [facility]  Phone: [phone]

## 2017-11-06 NOTE — PROGRESS NOTES
[facility]  Neurology Progress Note          Assessment and Plan:   1. Dementia  Not new at least since 2015 probably before  FTD?  Severe aphasia and unlikely to get better, prognosis is poor    2. Seziures  Recent diagnosis while at  [facility], on Keppra continue    3. New symptoms of weakness-  Not clear of etiology  MRI suggested possibe right thalamic infarct  Continue aspirin 81 mg daily (decreased form 325)  Echo suggested a possible mass in left atrium I have ordered cardiology consult to further assess whether we need an MRI as report recommended-but I think at this time it might warrant a pause and conversation with pt's decision makers. She has an incurable progressive neurological condition (dementia) would we treat a thrombus or cardiac mass aggressively. If answer is no consider palliative care consult    I spoke with nurse no family present. She will pass on concerns I have to Dr Kimball    I believe Dr Phoenix in [place] office is following pt as outpatient and she can see him in follow up.       Attestation:  I have reviewed today's vital signs, medications, labs and imaging.          Interval History:   No change per nursing.             Review of Systems:   Review of systems not obtained due to patient factors - confusion          Medications:     Current Facility-Administered Medications Ordered in Epic   Medication Dose Route Frequency Last Rate Last Dose     levETIRAcetam (KEPPRA) solution 1,000 mg  1,000 mg Oral BID   1,000 mg at 11/05/17 2016     glucose 40 % gel 15-30 g  15-30 g Oral Q15 Min PRN        Or     dextrose 50 % injection 25-50 mL  25-50 mL Intravenous Q15 Min PRN   25 mL at 11/04/17 0235    Or     glucagon injection 1 mg  1 mg Subcutaneous Q15 Min PRN         miconazole (MICATIN; MICRO GUARD) 2 % powder   Topical Q1H PRN         benztropine (COGENTIN) tablet 1-2 mg  1-2 mg Oral TID PRN         OLANZapine (zyPREXA) injection 2.5 mg  2.5 mg Intramuscular Q6H PRN          cholecalciferol (vitamin D3) tablet 2,000 Units  2,000 Units Oral Daily   2,000 Units at 11/05/17 0918     DULoxetine (CYMBALTA) EC capsule 30 mg  30 mg Oral Daily   30 mg at 11/05/17 0918     gabapentin (NEURONTIN) capsule 100 mg  100 mg Oral TID   100 mg at 11/05/17 2016     OLANZapine zydis (zyPREXA) ODT half-tab 2.5 mg  2.5 mg Oral BID   2.5 mg at 11/05/17 2016     naloxone (NARCAN) injection 0.1-0.4 mg  0.1-0.4 mg Intravenous Q2 Min PRN         Medication Instruction   Does not apply Continuous PRN         lidocaine 1 % 1 mL  1 mL Other Q1H PRN         lidocaine (LMX4) cream   Topical Q1H PRN         sodium chloride (PF) 0.9% PF flush 3 mL  3 mL Intracatheter Q1H PRN         acetaminophen (TYLENOL) tablet 650 mg  650 mg Oral Q4H PRN   650 mg at 11/04/17 1506     acetaminophen (TYLENOL) Suppository 650 mg  650 mg Rectal Q4H PRN         potassium chloride SA (K-DUR/KLOR-CON M) CR tablet 20-40 mEq  20-40 mEq Oral Q2H PRN         potassium chloride (KLOR-CON) Packet 20-40 mEq  20-40 mEq Oral or Feeding Tube Q2H PRN         potassium chloride 10 mEq in 100 mL sterile water intermittent infusion (premix)  10 mEq Intravenous Q1H PRN         potassium chloride 10 mEq in 100 mL intermittent infusion with 10 mg lidocaine  10 mEq Intravenous Q1H PRN         magnesium sulfate 4 g in 100 mL sterile water (premade)  4 g Intravenous Q4H PRN         HYDROmorphone (PF) (DILAUDID) injection 0.2 mg  0.2 mg Intravenous Q2H PRN         senna-docusate (SENOKOT-S;PERICOLACE) 8.6-50 MG per tablet 1 tablet  1 tablet Oral BID PRN        Or     senna-docusate (SENOKOT-S;PERICOLACE) 8.6-50 MG per tablet 2 tablet  2 tablet Oral BID PRN         polyethylene glycol (MIRALAX/GLYCOLAX) Packet 17 g  17 g Oral Daily PRN         bisacodyl (DULCOLAX) Suppository 10 mg  10 mg Rectal Daily PRN         ondansetron (ZOFRAN-ODT) ODT tab 4 mg  4 mg Oral Q6H PRN        Or     ondansetron (ZOFRAN) injection 4 mg  4 mg Intravenous Q6H PRN          prochlorperazine (COMPAZINE) injection 5 mg  5 mg Intravenous Q6H PRN        Or     prochlorperazine (COMPAZINE) tablet 5 mg  5 mg Oral Q6H PRN        Or     prochlorperazine (COMPAZINE) Suppository 12.5 mg  12.5 mg Rectal Q12H PRN         lidocaine 1 % 1 mL  1 mL Other Q1H PRN         lidocaine (LMX4) cream   Topical Q1H PRN         sodium chloride (PF) 0.9% PF flush 3 mL  3 mL Intracatheter Q1H PRN         sodium chloride (PF) 0.9% PF flush 3 mL  3 mL Intracatheter Q8H   3 mL at 17 0215     aspirin EC EC tablet 325 mg  325 mg Oral Daily   325 mg at 17 0918    Or     aspirin Suppository 300 mg  300 mg Rectal Daily   300 mg at 17 1035     labetalol (NORMODYNE/TRANDATE) injection 10-40 mg  10-40 mg Intravenous Q10 Min PRN         hydrALAZINE (APRESOLINE) injection 10-20 mg  10-20 mg Intravenous Q1H PRN         LORazepam (ATIVAN) injection 2 mg  2 mg Intravenous Q3 Min PRN         No current [facility]-ordered outpatient prescriptions on file.      PE  /58 (BP Location: Right arm)  Pulse 81  Temp 98.4  F (36.9  C) (Axillary)  Resp 16  Wt 113.9 kg (251 lb)  SpO2 95%  BMI 40.51 kg/m2  Gen: awake, nonverbal, no cooperative with exam  CV: RRR  Chest: CTA B  Neuro: Non verbal, does follow commands, face symmetric, EOMI, PERRL, moves all four ext voluntarily but with poor effort.   Ext: Mild edema          Data:     Results for orders placed or performed during the hospital encounter of 17 (from the past 24 hour(s))   Echo Complete Bubble    Narrative    [id number]  Formerly [facility], [id number]  [id number]  [id number]^[id number]^LISBET^A           [facility]  Echocardiography Laboratory  [address] [place] [id number]        Name: VILMA MARTINEZ  MRN: [id number]  : 1950  Study Date: 2017 09:54 AM  Age: 67 yrs  Gender: Female  Patient Location: [facility]  Reason For Study: Cerebrovascular Incident  Ordering Physician: LISBET MORAES  Referring Physician: Brianna Crespo  [place]  Performed By: Katharina Taylor NYDIA     BSA: 2.2 m2  Height: 66 in  Weight: 251 lb  HR: 80  BP: 124/80 mmHg  _____________________________________________________________________________  __        Procedure  Complete Portable Bubble Echo Adult.  _____________________________________________________________________________  __        Interpretation Summary     Echoes are present in the left atrium suggestive of left atrial mass, I think  it is calcified thickening in the lateral wall consider MRI  A contrast injection (Bubble Study) was performed that was negative for flow  across the interatrial septum.  Sinus rhythm was noted.  There is no comparison study available.  _____________________________________________________________________________  __        Left Ventricle  The left ventricle is normal in size. There is normal left ventricular wall  thickness. The visual ejection fraction is estimated at 55-60%. Grade I or  early diastolic dysfunction. Normal left ventricular wall motion.     Right Ventricle  The right ventricle is normal in structure, function and size.     Atria  Normal left atrial size. Echoes are present in the left atrium suggestive of  left atrial mass. Right atrial size is normal. Intact atrial septum. A  contrast injection (Bubble Study) was performed that was negative for flow  across the interatrial septum.     Mitral Valve  Thickened mitral valve posterior leaflet. There is mild mitral annular  calcification. There is trace to mild mitral regurgitation.        Tricuspid Valve  The tricuspid valve is normal in structure and function. Right ventricular  systolic pressure could not be approximated due to inadequate tricuspid  regurgitation.     Aortic Valve  The aortic valve is trileaflet with aortic valve sclerosis.     Vessels  Normal size aorta. Normal size ascending aorta. The IVC is normal in size and  reactivity with respiration, suggesting normal central venous pressure.      Pericardium  The pericardium appears normal.     Rhythm  Sinus rhythm was noted.     _____________________________________________________________________________  __  MMode/2D Measurements & Calculations  IVSd: 1.1 cm  LVIDd: 4.1 cm  LVIDs: 3.0 cm  LVPWd: 1.2 cm  FS: 25.9 %  EDV(Teich): 74.8 ml  ESV(Teich): 36.4 ml  LV mass(C)d: 155.5 grams  LV mass(C)dI: 70.6 grams/m2     Ao root diam: 3.2 cm  LA dimension: 3.2 cm  LA/Ao: 1.0  LVOT diam: 1.9 cm  LVOT area: 2.8 cm2  RWT: 0.57        Doppler Measurements & Calculations  MV E max edin: 76.2 cm/sec  MV A max edin: 112.0 cm/sec  MV E/A: 0.68  MV dec slope: 340.1 cm/sec2  E/E' av.1  Lateral E/e': 8.8  Medial E/e': 13.4              _____________________________________________________________________________  __        Report approved by: Zoraida Atkinson 2017 01:12 PM      Glucose by meter   Result Value Ref Range    Glucose 102 (H) 70 - 99 mg/dL   Glucose by meter   Result Value Ref Range    Glucose 92 70 - 99 mg/dL   Glucose by meter   Result Value Ref Range    Glucose 116 (H) 70 - 99 mg/dL   Glucose by meter   Result Value Ref Range    Glucose 82 70 - 99 mg/dL   Glucose by meter   Result Value Ref Range    Glucose 78 70 - 99 mg/dL   Glucose by meter   Result Value Ref Range    Glucose 77 70 - 99 mg/dL     -  -Bere Molina MD  [facility]  2017 8:47 AM

## 2017-11-08 PROBLEM — R53.1 WEAKNESS OF LEFT SIDE OF BODY: Status: ACTIVE | Noted: 2017-01-01

## 2017-11-08 PROBLEM — I63.9 CVA (CEREBRAL VASCULAR ACCIDENT) (H): Status: RESOLVED | Noted: 2017-01-01 | Resolved: 2017-01-01

## 2017-11-20 NOTE — PROGRESS NOTES
[facility]    Chief Complaint   Patient presents with     Nursing Home Acute     Dementia       HPI:    Zahira Sutherland is a 67 year old  (1950), who is being seen today for an episodic care visit at [facility].  HPI information obtained from: facility chart records.  Today's concern is:I recently decreased that Zyprexa dose and this was an f/u visit.  Nursing staff did not voice any concerns, did not report and increase in aggression.  Early onset Alzheimer's disease with behavioral disturbance> was in [facility] spring 2016  She is dependent on staff for all ADLs    Word finding difficulty  She is not able to express her needs    Seizure (H)  She has been free of seizures      ALLERGIES: Penicillins  Past Medical, Surgical, Family and Social History reviewed and updated in [facility].    Current Outpatient Prescriptions   Medication Sig Dispense Refill     nystatin (NYSTOP) 321256 UNIT/GM POWD Apply topically 2 times daily Apply to all red skin folds for Intertrgo Abd,folds and Axilla also use under breasts; No stop date       bisacodyl (DULCOLAX) 10 MG Suppository Place 10 mg rectally as needed for constipation       levETIRAcetam 1000 MG TABS Take 1,000 mg by mouth 2 times daily 60 tablet 0     aspirin 81 MG chewable tablet Take 81 mg by mouth daily       GABAPENTIN PO Take 100 mg by mouth 3 times daily       OLANZAPINE PO Take 2.5 mg by mouth At Bedtime        DULOXETINE HCL PO Take 30 mg by mouth daily       cholecalciferol 2000 UNITS CAPS Take 1 capsule by mouth daily       Multiple Vitamin (MULTIVITAMIN) per tablet Take 1 tablet by mouth daily. 90 tablet 3     [DISCONTINUED] OLANZapine (ZYPREXA PO) Take 1.25 mg % by mouth daily (with breakfast)       Medications reviewed:  Medications reconciled to facility chart and changes were made to reflect current medications as identified as above med list. Below are the changes that were made:   Medications stopped  "since last [facility] medication reconciliation:   There are no discontinued medications.    Medications started since last [facility] medication reconciliation:  No orders of the defined types were placed in this encounter.      REVIEW OF SYSTEMS:  Unobtainable secondary to cognitive impairment or aphasia.    Physical Exam:  /80  Pulse 60  Temp 97  F (36.1  C)  Resp 16  Ht 5' 6\" (1.676 m)  Wt 239 lb 6.4 oz (108.6 kg)  SpO2 93%  BMI 38.64 kg/m2  GENERAL APPEARANCE:  Alert, morbidly obese, cooperative  EYES:  EOM, conjunctivae, lids, pupils and irises normal> she looked at me, then closed her eyes  RESP:  respiratory effort and palpation of chest normal, lungs clear to auscultation , no respiratory distress  CV:  Palpation and auscultation of heart done , regular rate and rhythm, no murmur, rub, or gallop, no edema  ABDOMEN:  normal bowel sounds, soft, nontender, no hepatosplenomegaly or other masses, obese and diff to examine  M/S:   Gait and station abnormal >Depends on staff for transfers, is transported by staff with a w/c, is transferred with standing pablo  PSYCH:  oriented to herself, insight and judgement impaired, memory impaired , has a flat affect     BP 11/03-11/19: 101-125/ 64-83 mmHg    Recent Labs:    CBC RESULTS:   Recent Labs   Lab Test  11/03/17   1415  10/04/17   0305   WBC  7.7  10.7   RBC  4.71  4.12   HGB  15.2  13.0   HCT  46.1  40.7   MCV  98  99   MCH  32.3  31.6   MCHC  33.0  31.9   RDW  13.4  13.9   PLT  161  151       Last Basic Metabolic Panel:  Recent Labs   Lab Test  11/03/17   1415  10/04/17   0305   NA  144  141   POTASSIUM  4.0  5.0   CHLORIDE  107  111*   EVY  9.5  8.0*   CO2  30  30   BUN  14  27   CR  0.90  0.78   GLC  109*  82       Liver Function Studies -   Recent Labs   Lab Test  10/04/17   0305  09/29/17   0858   PROTTOTAL  6.0*  6.2*   ALBUMIN  2.5*  2.8*   BILITOTAL  0.4  0.8   ALKPHOS  69  86   AST  41  13   ALT  48  24       TSH   Date Value Ref Range Status   11/03/2017 " 3.20 0.40 - 4.00 mU/L Final   09/29/2017 2.49 0.40 - 4.00 mU/L Final       Lab Results   Component Value Date    A1C 5.6 11/03/2017    A1C 5.5 05/31/2017       Assessment/Plan:  Early onset Alzheimer's disease with behavioral disturbance> was [facility]  She is totally dependent for all ADLS  Cont with supportive care for her  I will stop the am Zyprexa and hope that she will sit up better  I will reassess her again with the plan of decreasing the evening Zyprexa dose     Word finding difficulty  Talk with her in short sentences  Give her a chance to respond    Seizure (H)  Will cont with current tx    I did talk with her sister who agreed to the decrease in Zyprexa  Orders:  See A&P    Total time spent with patient visit at the skilled nursing facility was 25 min including patient visit and phone call to patient contact. Greater than 50% of total time spent with counseling and coordinating care due to she has complex care needs    Electronically signed by  GABRIELE Brooks CNP

## 2017-11-27 PROBLEM — E66.812 CLASS 2 OBESITY IN ADULT: Status: ACTIVE | Noted: 2017-01-01

## 2017-11-27 NOTE — PROGRESS NOTES
[place] [facility]    Chief Complaint   Patient presents with     Nursing Home Acute       HPI:    Zahira Sutherland is a 67 year old  (1950), who is being seen today for an episodic care visit at [facility].  HPI information obtained from: facility chart records.  Today's concern is:F/u on current chronic health issues    Early onset Alzheimer's disease with behavioral disturbance> was [facility]  She is not able to express her needs    Gibberish aphasia  She attempted to respond to me> but was not able to    Seizure (H)  She has been free of seizures since her last readmit    Weakness of left side of body  Since I reduced the Olanzapine her posture has improved    Apraxia  She is not able to f/u commands    Bariatric surgery status  She has lost some weight  BMI now 38.1      ALLERGIES: Penicillins  Past Medical, Surgical, Family and Social History reviewed and updated in [facility].    Current Outpatient Prescriptions   Medication Sig Dispense Refill     nystatin (NYSTOP) 834021 UNIT/GM POWD Apply topically 2 times daily Apply to all red skin folds for Intertrgo Abd,folds and Axilla also use under breasts; No stop date       bisacodyl (DULCOLAX) 10 MG Suppository Place 10 mg rectally as needed for constipation       levETIRAcetam 1000 MG TABS Take 1,000 mg by mouth 2 times daily 60 tablet 0     aspirin 81 MG chewable tablet Take 81 mg by mouth daily       GABAPENTIN PO Take 100 mg by mouth 3 times daily       OLANZAPINE PO Take 1.25 mg by mouth At Bedtime        DULOXETINE HCL PO Take 30 mg by mouth daily       cholecalciferol 2000 UNITS CAPS Take 1 capsule by mouth daily       Multiple Vitamin (MULTIVITAMIN) per tablet Take 1 tablet by mouth daily. 90 tablet 3     Medications reviewed:  Medications reconciled to facility chart and changes were made to reflect current medications as identified as above med list. Below are the changes that were made:  "  Medications stopped since last [facility] medication reconciliation:   There are no discontinued medications.    Medications started since last [facility] medication reconciliation:  No orders of the defined types were placed in this encounter.    I decreased the Olanzapine to 1.25 mg po qhs    REVIEW OF SYSTEMS:  Unobtainable secondary to cognitive impairment or aphasia.    Physical Exam:  /76  Pulse 64  Temp 97  F (36.1  C)  Resp 16  Ht 5' 6\" (1.676 m)  Wt 239 lb 12.8 oz (108.8 kg)  SpO2 94%  BMI 38.7 kg/m2  GENERAL APPEARANCE:  Alert, in no distress, morbidly obese, cooperative  ENT:  Mouth and posterior oropharynx normal, moist mucous membranes, hearing is diff to assess, but she responds to voice commands  EYES:  EOM, conjunctivae, lids, pupils and irises normal, nice eye contact  RESP:  respiratory effort and palpation of chest normal, lungs clear to auscultation , no respiratory distress  CV:  Palpation and auscultation of heart done , regular rate and rhythm, no murmur, rub, or gallop, no edema  ABDOMEN:  normal bowel sounds, soft, nontender, no hepatosplenomegaly or other masses  M/S:   Gait and station abnormal >Depends on staff for transfers, is transported by staff with a w/c, transferred by [facility]  NEURO:   Cranial nerves 2-12 are normal tested and grossly at patient's baseline  PSYCH:  oriented to herself, insight and judgement impaired, memory impaired , affect and mood normal > she looked much more alert     BP  11/03-11/26: 101-125/64-83 mmHg    Recent Labs:    CBC RESULTS:   Recent Labs   Lab Test  11/03/17   1415  10/04/17   0305   WBC  7.7  10.7   RBC  4.71  4.12   HGB  15.2  13.0   HCT  46.1  40.7   MCV  98  99   MCH  32.3  31.6   MCHC  33.0  31.9   RDW  13.4  13.9   PLT  161  151       Last Basic Metabolic Panel:  Recent Labs   Lab Test  11/03/17   1415  10/04/17   0305   NA  144  141   POTASSIUM  4.0  5.0   CHLORIDE  107  111*   EVY  9.5  8.0*   CO2  30  30   BUN  14  27   CR  0.90  0.78 "   GLC  109*  82       Liver Function Studies -   Recent Labs   Lab Test  10/04/17   0305  09/29/17   0858   PROTTOTAL  6.0*  6.2*   ALBUMIN  2.5*  2.8*   BILITOTAL  0.4  0.8   ALKPHOS  69  86   AST  41  13   ALT  48  24       TSH   Date Value Ref Range Status   11/03/2017 3.20 0.40 - 4.00 mU/L Final   09/29/2017 2.49 0.40 - 4.00 mU/L Final       Lab Results   Component Value Date    A1C 5.6 11/03/2017    A1C 5.5 05/31/2017       Assessment/Plan:  Early onset Alzheimer's disease with behavioral disturbance> [facility]  She has been on Olanzapine  Today I decreased the loretta dose to 1.25 mg po qhs  Since she is looking very alert and staff not reported any behavioral issues  I will reassess her in one week and potentially d/c the Olanzapine    Gibberish aphasia  She is attempting to talk, but is not able to     Seizure (H)  Has been free of seizures  Cont with current meds    Weakness of left side of body  This has improved greatly with the taper of the Olanzapine    Apraxia  Talk with her, give her time to respond  Use simple language    Bariatric surgery status  She is slowly loosing some weight, this is beneficial for her  I am not concerned about the current weight loss      Orders:  See A&P    Total time spent with patient visit at the skilled nursing facility was 30 min including patient visit, review of past records and phone call to patient contact. Greater than 50% of total time spent with counseling and coordinating care due to she has complex care needs    Electronically signed by  GABRIELE Brooks CNP

## 2017-12-11 NOTE — PROGRESS NOTES
[place] [facility]    Chief Complaint   Patient presents with     Nursing Home Acute     Throat Problem       HPI:    Zahira Sutherland is a 67 year old  (1950), who is being seen today for an episodic care visit at [facility].  HPI information obtained from: facility chart records.Today's concern is:  Dysphagia- staff is reporting patient not taking as much po and ? If having more trouble eating/swallowing. Is on NDD2 with thin liq and is fed. Minimally verbal today which per staff is patient's recent baseline    Early onset Alzheimer's disease with behavioral disturbance> [facility]  - with slow progressive decline. In LTC with supportive cares and tx    Seizure disorder (H)  *- continues on Levetiracetam       ALLERGIES: Penicillins  Past Medical, Surgical, Family and Social History reviewed and updated in [facility].    Current Outpatient Prescriptions   Medication Sig Dispense Refill     nystatin (NYSTOP) 082933 UNIT/GM POWD Apply topically 2 times daily Apply to all red skin folds for Intertrgo Abd,folds and Axilla also use under breasts; No stop date       bisacodyl (DULCOLAX) 10 MG Suppository Place 10 mg rectally as needed for constipation       levETIRAcetam 1000 MG TABS Take 1,000 mg by mouth 2 times daily 60 tablet 0     aspirin 81 MG chewable tablet Take 81 mg by mouth daily       GABAPENTIN PO Take 100 mg by mouth 3 times daily       OLANZAPINE PO Take 1.25 mg by mouth At Bedtime        DULOXETINE HCL PO Take 30 mg by mouth daily       cholecalciferol 2000 UNITS CAPS Take 1 capsule by mouth daily       Multiple Vitamin (MULTIVITAMIN) per tablet Take 1 tablet by mouth daily. 90 tablet 3     Medications reviewed:  Medications reconciled to facility chart and changes were made to reflect current medications as identified as above med list. Below are the changes that were made:   Medications stopped since last EPIC medication reconciliation:  "  There are no discontinued medications.    Medications started since last [facility] medication reconciliation:  No orders of the defined types were placed in this encounter.        REVIEW OF SYSTEMS:  Unobtainable secondary to cognitive impairment or aphasia.    Physical Exam:  /74  Pulse 54  Temp 98.2  F (36.8  C)  Resp 16  Ht 5' 6\" (1.676 m)  Wt 236 lb 6.4 oz (107.2 kg)  SpO2 90%  BMI 38.16 kg/m2     ENT: Oral mucosa WNL- moist no erythema, lesions noted  Resp: Effort WNL, LSCTA- but patient not taking deep breaths  CV: VS as above  Abd- soft, nontender, BS +  Musc- TALLEY  Psych- sleepy    BP 11/12-12/10: 116-125/74-80 mmHg  Weights 11/12-12/10:  11/12: 248.1 lbs  11/19: 239.4 lbs  11/23: 242 lbs  11/26: 239.6 lbs  12/3: 238.2 lbs  12/10: 236.4 lbs    Recent Labs:    CBC RESULTS:   Recent Labs   Lab Test  11/03/17   1415  10/04/17   0305   WBC  7.7  10.7   RBC  4.71  4.12   HGB  15.2  13.0   HCT  46.1  40.7   MCV  98  99   MCH  32.3  31.6   MCHC  33.0  31.9   RDW  13.4  13.9   PLT  161  151       Last Basic Metabolic Panel:  Recent Labs   Lab Test  11/03/17   1415  10/04/17   0305   NA  144  141   POTASSIUM  4.0  5.0   CHLORIDE  107  111*   EVY  9.5  8.0*   CO2  30  30   BUN  14  27   CR  0.90  0.78   GLC  109*  82       Liver Function Studies -   Recent Labs   Lab Test  10/04/17   0305  09/29/17   0858   PROTTOTAL  6.0*  6.2*   ALBUMIN  2.5*  2.8*   BILITOTAL  0.4  0.8   ALKPHOS  69  86   AST  41  13   ALT  48  24       TSH   Date Value Ref Range Status   11/03/2017 3.20 0.40 - 4.00 mU/L Final   09/29/2017 2.49 0.40 - 4.00 mU/L Final       Lab Results   Component Value Date    A1C 5.6 11/03/2017    A1C 5.5 05/31/2017       Assessment/Plan:  (R13.10) Dysphagia  (primary encounter diagnosis)  Comment:  In setting of slow progressive decline  Plan: Will d/c olanzapine- has been being tapered 2/2 increased somnolence with addition of antiepileptic. Change diet to pureed with NTL and obtain ST eval/tx. Patient " should continue to be fed.     (G30.0,  F02.81) Early onset Alzheimer's disease with behavioral disturbance> [facility]  Comment: goals of care are comfort per POLST  Plan: continue supportive cares/tx- [facility]    (G40.909) Seizure disorder (H)  Comment: no seizures noted  Plan: continue on antiepileptic, observe            Electronically signed by  GABRIELE Richardson CNP

## 2017-12-12 NOTE — PROGRESS NOTES
[place] [facility]    Chief Complaint   Patient presents with     Nursing Home Acute       HPI:    Zahira Sutherland is a 67 year old  (1950), who is being seen today for an episodic care visit at [facility].  HPI information obtained from: facility chart records.Today's concern is:  Dysphagia, unspecified type  - ST evaluating today. Diet now Pureed with NTL. Patient more alert today- chewing peaches at the moment      ALLERGIES: Penicillins  Past Medical, Surgical, Family and Social History reviewed and updated in [facility].    Current Outpatient Prescriptions   Medication Sig Dispense Refill     nystatin (NYSTOP) [id number] UNIT/GM POWD Apply topically 2 times daily Apply to all red skin folds for Intertrgo Abd,folds and Axilla also use under breasts; No stop date       bisacodyl (DULCOLAX) 10 MG Suppository Place 10 mg rectally as needed for constipation       levETIRAcetam 1000 MG TABS Take 1,000 mg by mouth 2 times daily 60 tablet 0     aspirin 81 MG chewable tablet Take 81 mg by mouth daily       GABAPENTIN PO Take 100 mg by mouth 3 times daily       DULOXETINE HCL PO Take 30 mg by mouth daily       cholecalciferol 2000 UNITS CAPS Take 1 capsule by mouth daily       Multiple Vitamin (MULTIVITAMIN) per tablet Take 1 tablet by mouth daily. 90 tablet 3     Medications reviewed:  Medications reconciled to facility chart and changes were made to reflect current medications as identified as above med list. Below are the changes that were made:   Medications stopped since last EPIC medication reconciliation:   Medications Discontinued During This Encounter   Medication Reason     OLANZAPINE PO Discontinued by another Health Care Provider       Medications started since last [facility] medication reconciliation:  No orders of the defined types were placed in this encounter.        REVIEW OF SYSTEMS:  Unobtainable secondary to cognitive impairment or aphasia.    Physical Exam:  /74   "Pulse 54  Temp 98.2  F (36.8  C)  Resp 16  Ht 5' 6\" (1.676 m)  Wt 236 lb 6.4 oz (107.2 kg)  SpO2 90%  BMI 38.16 kg/m2        BP 11/07-12/10: 110-125/66-83 mmHg  Weights 11/12-12/10:  11/12: 248.1 lbs  11/19: 239.4 lbs  11/23: 242 lbs  11/26: 239.6 lbs  12/3: 238.2 lbs  12/10: 236.4 lbs     Recent Labs:    CBC RESULTS:   Recent Labs   Lab Test  11/03/17   1415  10/04/17   0305   WBC  7.7  10.7   RBC  4.71  4.12   HGB  15.2  13.0   HCT  46.1  40.7   MCV  98  99   MCH  32.3  31.6   MCHC  33.0  31.9   RDW  13.4  13.9   PLT  161  151       Last Basic Metabolic Panel:  Recent Labs   Lab Test  11/03/17   1415  10/04/17   0305   NA  144  141   POTASSIUM  4.0  5.0   CHLORIDE  107  111*   EVY  9.5  8.0*   CO2  30  30   BUN  14  27   CR  0.90  0.78   GLC  109*  82       Liver Function Studies -   Recent Labs   Lab Test  10/04/17   0305  09/29/17   0858   PROTTOTAL  6.0*  6.2*   ALBUMIN  2.5*  2.8*   BILITOTAL  0.4  0.8   ALKPHOS  69  86   AST  41  13   ALT  48  24       TSH   Date Value Ref Range Status   11/03/2017 3.20 0.40 - 4.00 mU/L Final   09/29/2017 2.49 0.40 - 4.00 mU/L Final       Lab Results   Component Value Date    A1C 5.6 11/03/2017    A1C 5.5 05/31/2017       Assessment/Plan:  Dysphagia, unspecified type  - call placed and voicemail left for daughter Niya Randall regarding patient status and changes in diet/ST eval.   - ST eval/tx  - pureed diet/NTL- aspir prec, feed  - follow weights/intake                Electronically signed by  GABRIELE Richardson CNP                  "

## 2017-12-13 NOTE — MR AVS SNAPSHOT
After Visit Summary   12/13/2017    Zahira Sutherland    MRN: [id number]           Patient Information     Date Of Birth          1950        Visit Information        Provider Department      12/13/2017 9:50 PM Shantal Lopez MD [facility]        Today's Diagnoses     Early onset Alzheimer's disease with behavioral disturbance> [facility]    -  1    Seizure (H)        Hypertension goal BP (blood pressure) < 130/80        Bariatric surgery status        Weight loss        Comfort measures only status           Follow-ups after your visit        Who to contact     If you have questions or need follow up information about today's clinic visit or your schedule please contact [facility] directly at [phone].  Normal or non-critical lab and imaging results will be communicated to you by [employer], letter or phone within 4 business days after the clinic has received the results. If you do not hear from us within 7 days, please contact the clinic through [employer] or phone. If you have a critical or abnormal lab result, we will notify you by phone as soon as possible.  Submit refill requests through [email or web address] or call your pharmacy and they will forward the refill request to us. Please allow 3 business days for your refill to be completed.          Additional Information About Your Visit        MyChart Information     [email or web address] gives you secure access to your electronic health record. If you see a primary care provider, you can also send messages to your care team and make appointments. If you have questions, please call your primary care clinic.  If you do not have a primary care provider, please call [phone] and they will assist you.        Care EveryWhere ID     This is your Care EveryWhere ID. This could be used by other organizations to access your [facility] medical records  [id number]        Your Vitals Were     Pulse Temperature Respirations  "Height Pulse Oximetry BMI (Body Mass Index)    54 98.2  F (36.8  C) 16 5' 6\" (1.676 m) 90% 38.16 kg/m2       Blood Pressure from Last 3 Encounters:   12/12/17 116/74   12/12/17 116/74   12/11/17 116/74    Weight from Last 3 Encounters:   12/12/17 236 lb 6.4 oz (107.2 kg)   12/12/17 236 lb 6.4 oz (107.2 kg)   12/11/17 236 lb 6.4 oz (107.2 kg)              Today, you had the following     No orders found for display       Primary Care Provider Office Phone # Fax #    Brianna Bullock JoslynJenna, APRN [facility] [phone]       [address] [address]        Equal Access to Services     [facility]: Hadii janae ku hadasho Soomaali, waaxda luqadaha, qaybta kaalmada adeegyada, ron mejia hayreshma guerra . So [facility] [phone].    ATENCIÓN: Si habla español, tiene a candelario disposición servicios gratuitos de asistencia lingüística. Charissa al [phone].    We comply with applicable federal civil rights laws and [place] laws. We do not discriminate on the basis of race, color, national origin, age, disability, sex, sexual orientation, or gender identity.            Thank you!     Thank you for choosing [facility]  for your care. Our goal is always to provide you with excellent care. Hearing back from our patients is one way we can continue to improve our services. Please take a few minutes to complete the written survey that you may receive in the mail after your visit with us. Thank you!             Your Updated Medication List - Protect others around you: Learn how to safely use, store and throw away your medicines at www.disposemymeds.org.          This list is accurate as of: 12/13/17 10:02 AM.  Always use your most recent med list.                   Brand Name Dispense Instructions for use Diagnosis    aspirin 81 MG chewable tablet      Take 81 mg by mouth daily        bisacodyl 10 MG Suppository    DULCOLAX     Place 10 mg rectally as needed for constipation    "     cholecalciferol 2000 UNITS Caps      Take 1 capsule by mouth daily        DULOXETINE HCL PO      Take 30 mg by mouth daily        GABAPENTIN PO      Take 100 mg by mouth 3 times daily        levETIRAcetam 1000 MG Tabs     60 tablet    Take 1,000 mg by mouth 2 times daily    Grand mal seizure (H)       multivitamin per tablet     90 tablet    Take 1 tablet by mouth daily.    Hypertension goal BP (blood pressure) < 130/80       NYSTOP [id number] UNIT/GM Powd   Generic drug:  nystatin      Apply topically 2 times daily Apply to all red skin folds for Intertrgo Abd,folds and Axilla also use under breasts; No stop date

## 2017-12-26 NOTE — PROGRESS NOTES
[place] [facility]    Chief Complaint   Patient presents with     Nursing Home Acute       HPI:    Zahira Sutherland is a 67 year old  (1950), who is being seen today for an episodic care visit at [facility].  HPI information obtained from: facility chart records.Today's concern is:  Loss of weight  Progressive decline in ability to eat/swallow   Wts: 251 10/22, 239 11/19, 221 12/24    Dysphagia, unspecified type  Aphasia  - ST completing, unable to advance 2/2 inconsistent LOC and persistent dysphagia in setting of Alzheimer's dementia    Early onset Alzheimer's disease with behavioral disturbance> [facility]  - has been tapered off Namenda, Aricept and Zyprexa with no noted behaviors or change     Seizure disorder (H)  -intractable -  hospitalized for in 11/2017 and started on Keppra  - is also on low dose Gabapentin      ALLERGIES: Penicillins  Past Medical, Surgical, Family and Social History reviewed and updated in EPIC.    Current Outpatient Prescriptions   Medication Sig Dispense Refill     nystatin (NYSTOP) [id number] UNIT/GM POWD Apply topically 2 times daily Apply to all red skin folds for Intertrgo Abd,folds and Axilla also use under breasts; No stop date       bisacodyl (DULCOLAX) 10 MG Suppository Place 10 mg rectally as needed for constipation       levETIRAcetam 1000 MG TABS Take 1,000 mg by mouth 2 times daily 60 tablet 0     aspirin 81 MG chewable tablet Take 81 mg by mouth daily       GABAPENTIN PO Take 100 mg by mouth 3 times daily       DULOXETINE HCL PO Take 30 mg by mouth daily       cholecalciferol 2000 UNITS CAPS Take 1 capsule by mouth daily       Multiple Vitamin (MULTIVITAMIN) per tablet Take 1 tablet by mouth daily. 90 tablet 3     Medications reviewed:  Medications reconciled to facility chart and changes were made to reflect current medications as identified as above med list. Below are the changes that were made:  "  Medications stopped since last EPIC medication reconciliation:   There are no discontinued medications.    Medications started since last [facility] medication reconciliation:  No orders of the defined types were placed in this encounter.        REVIEW OF SYSTEMS:  Unobtainable secondary to cognitive impairment or aphasia.    Physical Exam:  /76  Pulse 57  Temp 98.4  F (36.9  C)  Resp 16  Ht 5' 6\" (1.676 m)  Wt 221 lb (100.2 kg)  SpO2 92%  BMI 35.67 kg/m2    General: Sleeping. Opens eyes to stimulation but non- verbal  Resp: Effort WNL, LSCTA   CV:VS as above  Abd- soft, nontender, BS   Psych- as above      BP 11/03-12/18: 101-125/64-80 mmHg    Recent Labs:   CBC RESULTS:   Recent Labs   Lab Test  11/03/17   1415  10/04/17   0305   WBC  7.7  10.7   RBC  4.71  4.12   HGB  15.2  13.0   HCT  46.1  40.7   MCV  98  99   MCH  32.3  31.6   MCHC  33.0  31.9   RDW  13.4  13.9   PLT  161  151       Last Basic Metabolic Panel:  Recent Labs   Lab Test  11/03/17   1415  10/04/17   0305   NA  144  141   POTASSIUM  4.0  5.0   CHLORIDE  107  111*   EVY  9.5  8.0*   CO2  30  30   BUN  14  27   CR  0.90  0.78   GLC  109*  82       Liver Function Studies -   Recent Labs   Lab Test  10/04/17   0305  09/29/17   0858   PROTTOTAL  6.0*  6.2*   ALBUMIN  2.5*  2.8*   BILITOTAL  0.4  0.8   ALKPHOS  69  86   AST  41  13   ALT  48  24       TSH   Date Value Ref Range Status   11/03/2017 3.20 0.40 - 4.00 mU/L Final   09/29/2017 2.49 0.40 - 4.00 mU/L Final       Lab Results   Component Value Date    A1C 5.6 11/03/2017    A1C 5.5 05/31/2017     Assessment/Plan:  Loss of weight  Persistent and worsening 2/2 decreased po intake: > 10% in 6 months  - goals of care are comfort    Dysphagia, unspecified type  Aphasia   - in setting of Alzheimer's dementia, goal of care are comfort  - ST has signed off, no improvement      Early onset Alzheimer's disease with behavioral disturbance> [facility]  - Progressive disease, " continued global decline is anticipated.  - call to patient's H[facility] Stephanie Randall who is in agreement with Hospice referral at this time    Seizure disorder (H)  - continue on Keppra, consider d/c gabapentin          Electronically signed by  GABRIELE Richardson CNP

## 2017-12-31 NOTE — TELEPHONE ENCOUNTER
Writer contacted unit to check on status of Hospice sign on Sat and Patient status Per nursing patient daughter canceled meeting with hospice. Per nursing patient did not take medications today and did not eat. Is febrile. RR 24. O2 has been applied. Goals of care per sister/[facility] Stephanie Randall are comfort. Sister was present with patient until 12 a.m. Last evening.        Orders:    D/c all current oral meds  - add MS 5 mg q 6 and 2.5 mg q 2 prn- pain, dyspnea  - add Lorazepam 0.25 mg q 4 prn- agitation    ( called to pharmacy)     - add atropine 2 gtts po q 2 prn- secretions  - add acetaminophen supp 650 m q 6 prn fever, pain  - add bisacodyl sup 10 mg MI daily prn      (Writer placed call to Stephanie Randall (sister) to update on above and per Stephanie paperwork for hospice is in process of being signed today. As well patient's daughter is flying in from out of town to arrive tonight).

## 2018-10-19 NOTE — PROVIDER NOTIFICATION
Dr Padilla paged: Pt dtr wondering if pt should be on fluids with new UTI- dtr states she doesn't always drink adequate fluids due to mentation. Do you think fluids are needed? Thanks  
No

## 2020-04-20 NOTE — PLAN OF CARE
Pt responding nonverbally when calling her name, appears more alert today, Pt would occasionally say some illogical words. Neuros unchanged & not following directions, VSS. L facial droop, moderate grasp with LUE and a slight grasp on RUE, bilat severe weakness on LE. Tolerating DD1 diet with thin liquid and was total fed-total care. Repositioned with A2 using lift. Incontinent of bladder. Plan for possible d/c tomorrow to [place].   Yes

## 2023-12-11 NOTE — PROGRESS NOTES
[place] [facility]    Chief Complaint   Patient presents with     snf Regulatory       HPI:    Zahira Sutherland is a 67 year old  (1950), who is being seen today for a federally mandated E/M visit at [facility].  HPI information obtained from: facility chart records. Today's report:    She had been hospitalized for seizures. Intractible.   Early onset Alzheimer's.     She is on a couple seizure medications now.  zyprexa was tapered and stopped as she has been more quiet; probably due to the seizure medications.  aricept and namenda have been tapered and stopped as not doing much.    She is not eating well currently.   Sister is main contact.  Thought about possible Hospice. At this time, will monitor further and keep this in mind.   She does have a focus on comfort care. No more hospitalizations.     ALLERGIES: Penicillins  PAST MEDICAL HISTORY:  has a past medical history of Alzheimer's disease with early onset; Anemia; Apraxia; BMI 40.0-44.9, adult (H) (8/4/2017); CVA (cerebral vascular accident) (H) (11/3/2017); Dementia; Depressive disorder; Diabetes; Hypertension; Hypertension; Major depressive disorder, recurrent (H); Obesity; Personal history of other endocrine, nutritional and metabolic disease; Tinea unguium; and Vitamin D deficiency.  PAST SURGICAL HISTORY:  has a past surgical history that includes surgical history of - (1980s); gastric bypass (1992); Cholecystectomy; and back surgery.  FAMILY HISTORY: family history includes Breast Cancer in her mother; CEREBROVASCULAR DISEASE in her mother; Cancer - colorectal in her father; DIABETES in her father; HEART DISEASE in her mother; Hypertension in her sister; Obesity in her brother, father, mother, and sister; Other Cancer in her father.  SOCIAL HISTORY:  reports that she has never smoked. She has never used smokeless tobacco. She reports that she does not drink alcohol or use illicit  "drugs.    MEDICATIONS:  Current Outpatient Prescriptions   Medication Sig Dispense Refill     nystatin (NYSTOP) [id number] UNIT/GM POWD Apply topically 2 times daily Apply to all red skin folds for Intertrgo Abd,folds and Axilla also use under breasts; No stop date       bisacodyl (DULCOLAX) 10 MG Suppository Place 10 mg rectally as needed for constipation       levETIRAcetam 1000 MG TABS Take 1,000 mg by mouth 2 times daily 60 tablet 0     aspirin 81 MG chewable tablet Take 81 mg by mouth daily       GABAPENTIN PO Take 100 mg by mouth 3 times daily       DULOXETINE HCL PO Take 30 mg by mouth daily       cholecalciferol 2000 UNITS CAPS Take 1 capsule by mouth daily       Multiple Vitamin (MULTIVITAMIN) per tablet Take 1 tablet by mouth daily. 90 tablet 3     Medications reviewed:  Medications reconciled to facility chart and changes were made to reflect current medications as identified as above med list. Below are the changes that were made:   Medications stopped since last EPIC medication reconciliation:   There are no discontinued medications.    Medications started since last [facility] medication reconciliation:  No orders of the defined types were placed in this encounter.        ROS:  Unobtainable secondary to cognitive impairment or aphasia.  She did smile.    Exam:  Vitals: /74  Pulse 54  Temp 98.2  F (36.8  C)  Resp 16  Ht 5' 6\" (1.676 m)  Wt 236 lb 6.4 oz (107.2 kg)  SpO2 90%  BMI 38.16 kg/m2  BMI= Body mass index is 38.16 kg/(m^2).  GENERAL APPEARANCE:  Alert, in no distress. Sitting in wheelchair.   ENT:  Mucus membranes moist  RESP:  lungs clear to auscultation   CV:  Regular rate and rhythm.  ABDOMEN: soft, nontender  M/S:   trace edema  PSYCH:  memory impaired , affect and mood normal      BP  11/12-12/10: 116-122/ 74-83 mmHg    Lab/Diagnostic data:    CBC RESULTS:   Recent Labs   Lab Test  11/03/17   1415  10/04/17   0305   WBC  7.7  10.7   RBC  4.71  4.12   HGB  15.2  13.0   HCT  46.1  40.7 "   MCV  98  99   MCH  32.3  31.6   MCHC  33.0  31.9   RDW  13.4  13.9   PLT  161  151       Last Basic Metabolic Panel:  Recent Labs   Lab Test  11/03/17   1415  10/04/17   0305   NA  144  141   POTASSIUM  4.0  5.0   CHLORIDE  107  111*   EVY  9.5  8.0*   CO2  30  30   BUN  14  27   CR  0.90  0.78   GLC  109*  82       Liver Function Studies -   Recent Labs   Lab Test  10/04/17   0305  09/29/17   0858   PROTTOTAL  6.0*  6.2*   ALBUMIN  2.5*  2.8*   BILITOTAL  0.4  0.8   ALKPHOS  69  86   AST  41  13   ALT  48  24       TSH   Date Value Ref Range Status   11/03/2017 3.20 0.40 - 4.00 mU/L Final   09/29/2017 2.49 0.40 - 4.00 mU/L Final       Lab Results   Component Value Date    A1C 5.6 11/03/2017    A1C 5.5 05/31/2017       ASSESSMENT/PLAN  Early onset Alzheimer's disease with behavioral disturbance> [facility]  Continue her current supports.    Seizure (H)  Controlled now on current medications.     Hypertension goal BP (blood pressure) < 130/80  Satisfactory control currently.    Bariatric surgery status  This may enter into her nutritional needs. Brianna has discussed with dietician.    Weight loss  Eating less.   It has not quite been 10 % yet.  Continuing to monitor. Keeping Hospice referral in mind if it looks like she starts to have difficulty swallowing etc.    Comfort measures:  Focus on comfort. She is not to be hospitalized further.        Electronically signed by:  Shantal Lopez MD, MD     no